# Patient Record
Sex: MALE | Race: WHITE | NOT HISPANIC OR LATINO | URBAN - METROPOLITAN AREA
[De-identification: names, ages, dates, MRNs, and addresses within clinical notes are randomized per-mention and may not be internally consistent; named-entity substitution may affect disease eponyms.]

---

## 2017-05-20 ENCOUNTER — INPATIENT (INPATIENT)
Facility: HOSPITAL | Age: 82
LOS: 10 days | Discharge: HOME HEALTH | End: 2017-05-31
Attending: INTERNAL MEDICINE | Admitting: INTERNAL MEDICINE
Payer: MEDICARE

## 2017-05-20 VITALS
TEMPERATURE: 99 F | DIASTOLIC BLOOD PRESSURE: 67 MMHG | SYSTOLIC BLOOD PRESSURE: 117 MMHG | RESPIRATION RATE: 15 BRPM | OXYGEN SATURATION: 99 % | HEART RATE: 88 BPM

## 2017-05-20 DIAGNOSIS — Z89.611 ACQUIRED ABSENCE OF RIGHT LEG ABOVE KNEE: Chronic | ICD-10-CM

## 2017-05-20 NOTE — ED ADULT TRIAGE NOTE - CHIEF COMPLAINT QUOTE
Pt c/o back and shoulder pain today.  Pt has history of right BKA and CVA.  Pt denies trauma/fall. Pt coming from home, c/o back and shoulder pain today.  Pt has history of right BKA and CVA.  Pt denies trauma/fall.

## 2017-05-21 DIAGNOSIS — K21.9 GASTRO-ESOPHAGEAL REFLUX DISEASE WITHOUT ESOPHAGITIS: ICD-10-CM

## 2017-05-21 DIAGNOSIS — N17.9 ACUTE KIDNEY FAILURE, UNSPECIFIED: ICD-10-CM

## 2017-05-21 DIAGNOSIS — E86.0 DEHYDRATION: ICD-10-CM

## 2017-05-21 DIAGNOSIS — E87.5 HYPERKALEMIA: ICD-10-CM

## 2017-05-21 DIAGNOSIS — I50.22 CHRONIC SYSTOLIC (CONGESTIVE) HEART FAILURE: ICD-10-CM

## 2017-05-21 DIAGNOSIS — Z29.9 ENCOUNTER FOR PROPHYLACTIC MEASURES, UNSPECIFIED: ICD-10-CM

## 2017-05-21 DIAGNOSIS — I10 ESSENTIAL (PRIMARY) HYPERTENSION: ICD-10-CM

## 2017-05-21 DIAGNOSIS — R41.0 DISORIENTATION, UNSPECIFIED: ICD-10-CM

## 2017-05-21 LAB
ALBUMIN SERPL ELPH-MCNC: 4.4 G/DL — SIGNIFICANT CHANGE UP (ref 3.3–5)
ALP SERPL-CCNC: 50 U/L — SIGNIFICANT CHANGE UP (ref 40–120)
ALT FLD-CCNC: 12 U/L — SIGNIFICANT CHANGE UP (ref 4–41)
APPEARANCE UR: CLEAR — SIGNIFICANT CHANGE UP
AST SERPL-CCNC: 18 U/L — SIGNIFICANT CHANGE UP (ref 4–40)
BASE EXCESS BLDV CALC-SCNC: 3 MMOL/L — SIGNIFICANT CHANGE UP
BASOPHILS # BLD AUTO: 0.04 K/UL — SIGNIFICANT CHANGE UP (ref 0–0.2)
BASOPHILS # BLD AUTO: 0.04 K/UL — SIGNIFICANT CHANGE UP (ref 0–0.2)
BASOPHILS NFR BLD AUTO: 0.5 % — SIGNIFICANT CHANGE UP (ref 0–2)
BASOPHILS NFR BLD AUTO: 0.5 % — SIGNIFICANT CHANGE UP (ref 0–2)
BILIRUB SERPL-MCNC: 0.4 MG/DL — SIGNIFICANT CHANGE UP (ref 0.2–1.2)
BILIRUB UR-MCNC: NEGATIVE — SIGNIFICANT CHANGE UP
BLOOD GAS VENOUS - CREATININE: 1.67 MG/DL — HIGH (ref 0.5–1.3)
BLOOD UR QL VISUAL: NEGATIVE — SIGNIFICANT CHANGE UP
BUN SERPL-MCNC: 49 MG/DL — HIGH (ref 7–23)
BUN SERPL-MCNC: 54 MG/DL — HIGH (ref 7–23)
CALCIUM SERPL-MCNC: 10 MG/DL — SIGNIFICANT CHANGE UP (ref 8.4–10.5)
CALCIUM SERPL-MCNC: 9.1 MG/DL — SIGNIFICANT CHANGE UP (ref 8.4–10.5)
CHLORIDE BLDV-SCNC: 108 MMOL/L — SIGNIFICANT CHANGE UP (ref 96–108)
CHLORIDE SERPL-SCNC: 101 MMOL/L — SIGNIFICANT CHANGE UP (ref 98–107)
CHLORIDE SERPL-SCNC: 106 MMOL/L — SIGNIFICANT CHANGE UP (ref 98–107)
CK MB BLD-MCNC: 2.46 NG/ML — SIGNIFICANT CHANGE UP (ref 1–6.6)
CK MB BLD-MCNC: 2.87 NG/ML — SIGNIFICANT CHANGE UP (ref 1–6.6)
CK MB BLD-MCNC: SIGNIFICANT CHANGE UP (ref 0–2.5)
CK MB BLD-MCNC: SIGNIFICANT CHANGE UP (ref 0–2.5)
CK SERPL-CCNC: 56 U/L — SIGNIFICANT CHANGE UP (ref 30–200)
CK SERPL-CCNC: 72 U/L — SIGNIFICANT CHANGE UP (ref 30–200)
CO2 SERPL-SCNC: 25 MMOL/L — SIGNIFICANT CHANGE UP (ref 22–31)
CO2 SERPL-SCNC: 34 MMOL/L — HIGH (ref 22–31)
COLOR SPEC: SIGNIFICANT CHANGE UP
CREAT SERPL-MCNC: 1.36 MG/DL — HIGH (ref 0.5–1.3)
CREAT SERPL-MCNC: 1.69 MG/DL — HIGH (ref 0.5–1.3)
EOSINOPHIL # BLD AUTO: 0.17 K/UL — SIGNIFICANT CHANGE UP (ref 0–0.5)
EOSINOPHIL # BLD AUTO: 0.19 K/UL — SIGNIFICANT CHANGE UP (ref 0–0.5)
EOSINOPHIL NFR BLD AUTO: 1.9 % — SIGNIFICANT CHANGE UP (ref 0–6)
EOSINOPHIL NFR BLD AUTO: 2.6 % — SIGNIFICANT CHANGE UP (ref 0–6)
GAS PNL BLDV: 134 MMOL/L — LOW (ref 136–146)
GLUCOSE BLDV-MCNC: 176 — HIGH (ref 70–99)
GLUCOSE SERPL-MCNC: 141 MG/DL — HIGH (ref 70–99)
GLUCOSE SERPL-MCNC: 179 MG/DL — HIGH (ref 70–99)
GLUCOSE UR-MCNC: NEGATIVE — SIGNIFICANT CHANGE UP
HBA1C BLD-MCNC: 6.9 % — HIGH (ref 4–5.6)
HCO3 BLDV-SCNC: 26 MMOL/L — SIGNIFICANT CHANGE UP (ref 20–27)
HCT VFR BLD CALC: 32.5 % — LOW (ref 39–50)
HCT VFR BLD CALC: 37.9 % — LOW (ref 39–50)
HCT VFR BLDV CALC: 38.2 % — LOW (ref 39–51)
HGB BLD-MCNC: 10.8 G/DL — LOW (ref 13–17)
HGB BLD-MCNC: 12.3 G/DL — LOW (ref 13–17)
HGB BLDV-MCNC: 12.4 G/DL — LOW (ref 13–17)
HYALINE CASTS # UR AUTO: SIGNIFICANT CHANGE UP (ref 0–?)
IMM GRANULOCYTES NFR BLD AUTO: 0.1 % — SIGNIFICANT CHANGE UP (ref 0–1.5)
IMM GRANULOCYTES NFR BLD AUTO: 0.3 % — SIGNIFICANT CHANGE UP (ref 0–1.5)
KETONES UR-MCNC: NEGATIVE — SIGNIFICANT CHANGE UP
LACTATE BLDV-MCNC: 2.8 MMOL/L — HIGH (ref 0.5–2)
LEUKOCYTE ESTERASE UR-ACNC: NEGATIVE — SIGNIFICANT CHANGE UP
LYMPHOCYTES # BLD AUTO: 1.62 K/UL — SIGNIFICANT CHANGE UP (ref 1–3.3)
LYMPHOCYTES # BLD AUTO: 1.86 K/UL — SIGNIFICANT CHANGE UP (ref 1–3.3)
LYMPHOCYTES # BLD AUTO: 18.5 % — SIGNIFICANT CHANGE UP (ref 13–44)
LYMPHOCYTES # BLD AUTO: 25.4 % — SIGNIFICANT CHANGE UP (ref 13–44)
MAGNESIUM SERPL-MCNC: 1.8 MG/DL — SIGNIFICANT CHANGE UP (ref 1.6–2.6)
MCHC RBC-ENTMCNC: 31 PG — SIGNIFICANT CHANGE UP (ref 27–34)
MCHC RBC-ENTMCNC: 31.3 PG — SIGNIFICANT CHANGE UP (ref 27–34)
MCHC RBC-ENTMCNC: 32.5 % — SIGNIFICANT CHANGE UP (ref 32–36)
MCHC RBC-ENTMCNC: 33.2 % — SIGNIFICANT CHANGE UP (ref 32–36)
MCV RBC AUTO: 94.2 FL — SIGNIFICANT CHANGE UP (ref 80–100)
MCV RBC AUTO: 95.5 FL — SIGNIFICANT CHANGE UP (ref 80–100)
MONOCYTES # BLD AUTO: 0.46 K/UL — SIGNIFICANT CHANGE UP (ref 0–0.9)
MONOCYTES # BLD AUTO: 0.82 K/UL — SIGNIFICANT CHANGE UP (ref 0–0.9)
MONOCYTES NFR BLD AUTO: 6.3 % — SIGNIFICANT CHANGE UP (ref 2–14)
MONOCYTES NFR BLD AUTO: 9.4 % — SIGNIFICANT CHANGE UP (ref 2–14)
MUCOUS THREADS # UR AUTO: SIGNIFICANT CHANGE UP
NEUTROPHILS # BLD AUTO: 4.77 K/UL — SIGNIFICANT CHANGE UP (ref 1.8–7.4)
NEUTROPHILS # BLD AUTO: 6.09 K/UL — SIGNIFICANT CHANGE UP (ref 1.8–7.4)
NEUTROPHILS NFR BLD AUTO: 65.1 % — SIGNIFICANT CHANGE UP (ref 43–77)
NEUTROPHILS NFR BLD AUTO: 69.4 % — SIGNIFICANT CHANGE UP (ref 43–77)
NITRITE UR-MCNC: NEGATIVE — SIGNIFICANT CHANGE UP
PCO2 BLDV: 45 MMHG — SIGNIFICANT CHANGE UP (ref 41–51)
PH BLDV: 7.4 PH — SIGNIFICANT CHANGE UP (ref 7.32–7.43)
PH UR: 6 — SIGNIFICANT CHANGE UP (ref 4.6–8)
PLATELET # BLD AUTO: 140 K/UL — LOW (ref 150–400)
PLATELET # BLD AUTO: 146 K/UL — LOW (ref 150–400)
PMV BLD: 11.9 FL — SIGNIFICANT CHANGE UP (ref 7–13)
PMV BLD: 11.9 FL — SIGNIFICANT CHANGE UP (ref 7–13)
PO2 BLDV: 47 MMHG — HIGH (ref 35–40)
POTASSIUM BLDV-SCNC: 5.1 MMOL/L — HIGH (ref 3.4–4.5)
POTASSIUM SERPL-MCNC: 4.9 MMOL/L — SIGNIFICANT CHANGE UP (ref 3.5–5.3)
POTASSIUM SERPL-MCNC: 5.6 MMOL/L — HIGH (ref 3.5–5.3)
POTASSIUM SERPL-SCNC: 4.9 MMOL/L — SIGNIFICANT CHANGE UP (ref 3.5–5.3)
POTASSIUM SERPL-SCNC: 5.6 MMOL/L — HIGH (ref 3.5–5.3)
PROT SERPL-MCNC: 7.4 G/DL — SIGNIFICANT CHANGE UP (ref 6–8.3)
PROT UR-MCNC: 30 — SIGNIFICANT CHANGE UP
RBC # BLD: 3.45 M/UL — LOW (ref 4.2–5.8)
RBC # BLD: 3.97 M/UL — LOW (ref 4.2–5.8)
RBC # FLD: 14.6 % — HIGH (ref 10.3–14.5)
RBC # FLD: 14.6 % — HIGH (ref 10.3–14.5)
RBC CASTS # UR COMP ASSIST: SIGNIFICANT CHANGE UP (ref 0–?)
SAO2 % BLDV: 81.2 % — SIGNIFICANT CHANGE UP (ref 60–85)
SODIUM SERPL-SCNC: 144 MMOL/L — SIGNIFICANT CHANGE UP (ref 135–145)
SODIUM SERPL-SCNC: 144 MMOL/L — SIGNIFICANT CHANGE UP (ref 135–145)
SP GR SPEC: 1.02 — SIGNIFICANT CHANGE UP (ref 1–1.03)
SQUAMOUS # UR AUTO: SIGNIFICANT CHANGE UP
TROPONIN T SERPL-MCNC: < 0.06 NG/ML — SIGNIFICANT CHANGE UP (ref 0–0.06)
TROPONIN T SERPL-MCNC: < 0.06 NG/ML — SIGNIFICANT CHANGE UP (ref 0–0.06)
UROBILINOGEN FLD QL: 2 E.U. — SIGNIFICANT CHANGE UP (ref 0.1–0.2)
WBC # BLD: 7.33 K/UL — SIGNIFICANT CHANGE UP (ref 3.8–10.5)
WBC # BLD: 8.77 K/UL — SIGNIFICANT CHANGE UP (ref 3.8–10.5)
WBC # FLD AUTO: 7.33 K/UL — SIGNIFICANT CHANGE UP (ref 3.8–10.5)
WBC # FLD AUTO: 8.77 K/UL — SIGNIFICANT CHANGE UP (ref 3.8–10.5)
WBC UR QL: SIGNIFICANT CHANGE UP (ref 0–?)

## 2017-05-21 PROCEDURE — 70450 CT HEAD/BRAIN W/O DYE: CPT | Mod: 26

## 2017-05-21 RX ORDER — GABAPENTIN 400 MG/1
300 CAPSULE ORAL DAILY
Qty: 0 | Refills: 0 | Status: DISCONTINUED | OUTPATIENT
Start: 2017-05-21 | End: 2017-05-31

## 2017-05-21 RX ORDER — ASPIRIN/CALCIUM CARB/MAGNESIUM 324 MG
81 TABLET ORAL DAILY
Qty: 0 | Refills: 0 | Status: DISCONTINUED | OUTPATIENT
Start: 2017-05-21 | End: 2017-05-31

## 2017-05-21 RX ORDER — SODIUM CHLORIDE 9 MG/ML
3 INJECTION INTRAMUSCULAR; INTRAVENOUS; SUBCUTANEOUS EVERY 8 HOURS
Qty: 0 | Refills: 0 | Status: DISCONTINUED | OUTPATIENT
Start: 2017-05-21 | End: 2017-05-31

## 2017-05-21 RX ORDER — HALOPERIDOL DECANOATE 100 MG/ML
1 INJECTION INTRAMUSCULAR ONCE
Qty: 0 | Refills: 0 | Status: COMPLETED | OUTPATIENT
Start: 2017-05-21 | End: 2017-05-21

## 2017-05-21 RX ORDER — ACETAMINOPHEN 500 MG
650 TABLET ORAL EVERY 6 HOURS
Qty: 0 | Refills: 0 | Status: DISCONTINUED | OUTPATIENT
Start: 2017-05-21 | End: 2017-05-31

## 2017-05-21 RX ORDER — ACETAMINOPHEN 500 MG
650 TABLET ORAL ONCE
Qty: 0 | Refills: 0 | Status: COMPLETED | OUTPATIENT
Start: 2017-05-21 | End: 2017-05-21

## 2017-05-21 RX ORDER — RISPERIDONE 4 MG/1
1 TABLET ORAL AT BEDTIME
Qty: 0 | Refills: 0 | Status: DISCONTINUED | OUTPATIENT
Start: 2017-05-21 | End: 2017-05-31

## 2017-05-21 RX ORDER — HALOPERIDOL DECANOATE 100 MG/ML
0.5 INJECTION INTRAMUSCULAR ONCE
Qty: 0 | Refills: 0 | Status: DISCONTINUED | OUTPATIENT
Start: 2017-05-21 | End: 2017-05-21

## 2017-05-21 RX ORDER — PANTOPRAZOLE SODIUM 20 MG/1
40 TABLET, DELAYED RELEASE ORAL
Qty: 0 | Refills: 0 | Status: DISCONTINUED | OUTPATIENT
Start: 2017-05-21 | End: 2017-05-31

## 2017-05-21 RX ORDER — CITALOPRAM 10 MG/1
10 TABLET, FILM COATED ORAL DAILY
Qty: 0 | Refills: 0 | Status: DISCONTINUED | OUTPATIENT
Start: 2017-05-21 | End: 2017-05-31

## 2017-05-21 RX ORDER — HEPARIN SODIUM 5000 [USP'U]/ML
5000 INJECTION INTRAVENOUS; SUBCUTANEOUS EVERY 8 HOURS
Qty: 0 | Refills: 0 | Status: DISCONTINUED | OUTPATIENT
Start: 2017-05-21 | End: 2017-05-31

## 2017-05-21 RX ORDER — SENNA PLUS 8.6 MG/1
2 TABLET ORAL AT BEDTIME
Qty: 0 | Refills: 0 | Status: DISCONTINUED | OUTPATIENT
Start: 2017-05-21 | End: 2017-05-31

## 2017-05-21 RX ORDER — RISPERIDONE 4 MG/1
0.5 TABLET ORAL
Qty: 0 | Refills: 0 | Status: DISCONTINUED | OUTPATIENT
Start: 2017-05-21 | End: 2017-05-31

## 2017-05-21 RX ORDER — SODIUM CHLORIDE 9 MG/ML
1000 INJECTION INTRAMUSCULAR; INTRAVENOUS; SUBCUTANEOUS ONCE
Qty: 0 | Refills: 0 | Status: COMPLETED | OUTPATIENT
Start: 2017-05-21 | End: 2017-05-21

## 2017-05-21 RX ORDER — SIMVASTATIN 20 MG/1
20 TABLET, FILM COATED ORAL AT BEDTIME
Qty: 0 | Refills: 0 | Status: DISCONTINUED | OUTPATIENT
Start: 2017-05-21 | End: 2017-05-31

## 2017-05-21 RX ORDER — SODIUM CHLORIDE 9 MG/ML
1000 INJECTION INTRAMUSCULAR; INTRAVENOUS; SUBCUTANEOUS
Qty: 0 | Refills: 0 | Status: DISCONTINUED | OUTPATIENT
Start: 2017-05-21 | End: 2017-05-22

## 2017-05-21 RX ORDER — CALCIUM GLUCONATE 100 MG/ML
1 VIAL (ML) INTRAVENOUS ONCE
Qty: 0 | Refills: 0 | Status: COMPLETED | OUTPATIENT
Start: 2017-05-21 | End: 2017-05-21

## 2017-05-21 RX ADMIN — CITALOPRAM 10 MILLIGRAM(S): 10 TABLET, FILM COATED ORAL at 12:29

## 2017-05-21 RX ADMIN — Medication 650 MILLIGRAM(S): at 03:28

## 2017-05-21 RX ADMIN — RISPERIDONE 1 MILLIGRAM(S): 4 TABLET ORAL at 21:42

## 2017-05-21 RX ADMIN — Medication 81 MILLIGRAM(S): at 12:29

## 2017-05-21 RX ADMIN — RISPERIDONE 0.5 MILLIGRAM(S): 4 TABLET ORAL at 11:39

## 2017-05-21 RX ADMIN — GABAPENTIN 300 MILLIGRAM(S): 400 CAPSULE ORAL at 12:29

## 2017-05-21 RX ADMIN — SODIUM CHLORIDE 3 MILLILITER(S): 9 INJECTION INTRAMUSCULAR; INTRAVENOUS; SUBCUTANEOUS at 21:08

## 2017-05-21 RX ADMIN — Medication 1 MILLIGRAM(S): at 04:40

## 2017-05-21 RX ADMIN — Medication 200 GRAM(S): at 03:57

## 2017-05-21 RX ADMIN — Medication 650 MILLIGRAM(S): at 01:58

## 2017-05-21 RX ADMIN — HEPARIN SODIUM 5000 UNIT(S): 5000 INJECTION INTRAVENOUS; SUBCUTANEOUS at 21:42

## 2017-05-21 RX ADMIN — Medication 1 MILLIGRAM(S): at 21:04

## 2017-05-21 RX ADMIN — Medication 1 MILLIGRAM(S): at 16:02

## 2017-05-21 RX ADMIN — SODIUM CHLORIDE 1000 MILLILITER(S): 9 INJECTION INTRAMUSCULAR; INTRAVENOUS; SUBCUTANEOUS at 03:57

## 2017-05-21 RX ADMIN — Medication 1 MILLIGRAM(S): at 03:57

## 2017-05-21 RX ADMIN — HALOPERIDOL DECANOATE 1 MILLIGRAM(S): 100 INJECTION INTRAMUSCULAR at 22:25

## 2017-05-21 RX ADMIN — SODIUM CHLORIDE 3 MILLILITER(S): 9 INJECTION INTRAMUSCULAR; INTRAVENOUS; SUBCUTANEOUS at 14:00

## 2017-05-21 RX ADMIN — HEPARIN SODIUM 5000 UNIT(S): 5000 INJECTION INTRAVENOUS; SUBCUTANEOUS at 14:00

## 2017-05-21 RX ADMIN — SIMVASTATIN 20 MILLIGRAM(S): 20 TABLET, FILM COATED ORAL at 21:42

## 2017-05-21 NOTE — CONSULT NOTE ADULT - SUBJECTIVE AND OBJECTIVE BOX
HPI:  81 yo male PMHx of CVA with R sided paralysis, Dementia, CAD, CABG, Systolic HF, HTN, HLD and DM p/w worsening confusion, slurred speech and R shoulder pain x 2 weeks. pt confuse, unable to attain proper hx, information obtained from H&P .  No reported fever, chills, diaphoresis, chest pain, sob or palpitations.       PAST MEDICAL & SURGICAL HISTORY:  Aortic stenosis, moderate  PAD (peripheral artery disease)  GERD (Gastroesophageal Reflux Disease)  CAD (Coronary Artery Disease)  HTN  DM (Diabetes Mellitus)  CVA (Cerebral Infarction): with right hemiparesis s/p carotididendarterectomy in 2008  S/P AKA (above knee amputation) unilateral, right  S/P CABG X 4: 1998  History of Carotid Endarterectomy          PREVIOUS DIAGNOSTIC TESTING:    [x ] Echocardiogram:    Study Date:   9/9/2016Ejection Fraction (Teicholtz): 23 %CONCLUSIONS:  1. Mitral annular calcification and calcified mitral  leaflets with decreased diastolic opening. Minimal mitral  regurgitation. Mean transmitral valve gradient equals 4 mm  Hg, consistent with mild mitral stenosis.  2. Calcified aortic valve with decreased opening. Leaflet  morphology not well visualized, probable moderate aortic  stenosis.  Peak transaortic valve gradient equals 26 mm Hg,  mean transaortic valve gradient equals 11 mm Hg, which  could be underestimated in the setting of severe LV  dysfunction.  3. Severe left ventricular enlargement.  4. Severe global left ventricular systolic dysfunction.  5. Normal right ventricular size and function.  6. Normal tricuspid valve.  Mild-moderate tricuspid  regurgitation.  7. Estimated pulmonary artery systolic pressure equals 35  mm Hg, assuming right atrial pressure equals 10  mm Hg,  consistent with borderline pulmonary hypertension.  8. Bilateral pleural effusions.    [x] Stress Test:     STUDY DATE: 07/06/2016IMPRESSIONS:Abnormal Study  * Myocardial Perfusion SPECT results are abnormal.  * There is a large, severe defect in inferior wall that is  fixed, suggestive of infarct.There are large, moderate to  severe defects in anterolateral, apical walls that are  partially reversible, suggestive of infarction with  moderate cem-infarct ischemia.There is a  defect.  * Post-stress gated wall motion analysis was performed  (LVEF = 23 %;LVEDV = 293 ml.), revealing severe  hypokinesis. The inferior wall is akinetic. 	    MEDICATIONS:  MEDICATIONS  (STANDING):  aspirin enteric coated 81milliGRAM(s) Oral daily  citalopram 10milliGRAM(s) Oral daily  pantoprazole    Tablet 40milliGRAM(s) Oral before breakfast  gabapentin 300milliGRAM(s) Oral daily  risperiDONE   Tablet 0.5milliGRAM(s) Oral <User Schedule>  risperiDONE   Tablet 1milliGRAM(s) Oral at bedtime  simvastatin 20milliGRAM(s) Oral at bedtime  sodium chloride 0.9% lock flush 3milliLiter(s) IV Push every 8 hours  heparin  Injectable 5000Unit(s) SubCutaneous every 8 hours      FAMILY HISTORY:  No pertinent family history in first degree relatives      SOCIAL HISTORY:    [x ] Non-smoker  [ ] Smoker  [ ] Alcohol    Allergies    No Known Allergies    Intolerances    	    REVIEW OF SYSTEMS:  CONSTITUTIONAL: No fever, weight loss, or fatigue  EYES: No eye pain, visual disturbances, or discharge  ENMT:  No difficulty hearing, tinnitus, vertigo; No sinus or throat pain  NECK: No pain or stiffness  RESPIRATORY: No cough, wheezing, chills or hemoptysis; No Shortness of Breath  CARDIOVASCULAR: No chest pain, palpitations, passing out, dizziness, or leg swelling  GASTROINTESTINAL: No abdominal or epigastric pain. No nausea, vomiting, or hematemesis; No diarrhea or constipation. No melena or hematochezia.  GENITOURINARY: No dysuria, frequency, hematuria, or incontinence  NEUROLOGICAL: No headaches, memory loss, loss of strength, numbness, or tremors  SKIN: No itching, burning, rashes, or lesions   	    [ ] All others negative	  [x ] Unable to obtain    PHYSICAL EXAM:  T(C): 37.1, Max: 37.2 (05-20 @ 23:41)  HR: 62 (62 - 92)  BP: 143/55 (97/62 - 149/59)  RR: 16 (15 - 16)  SpO2: 97% (96% - 99%)  Wt(kg): --  I&O's Summary      Appearance: Normal	  Psychiatry: A & O x 1, confuse  HEENT:   Normal oral mucosa, PERRL, EOMI	  Lymphatic: No lymphadenopathy  Cardiovascular: Normal S1 S2,RRR, No JVD, 2/6 sys  murmurs  Respiratory: Lungs clear to auscultation	  Gastrointestinal:  Soft, Non-tender, + BS	  Skin: No rashes, No ecchymoses, No cyanosis	  Neurologic: Non-focal  Extremities: Normal range of motion, No clubbing, cyanosis or edema  Vascular: Peripheral pulses palpable 2+ bilaterally    TELEMETRY: 	    ECG: SR with 2 degree AVB type 1, LAD, RBBB, LVH 	    RADIOLOGY:  CT Head: PROCEDURE DATE:  May 21 2017   IMPRESSION:   No acute intracranial hemorrhage, mass effect, or midline shift.    Unchanged extensive encephalomalacia and gliosis within the bilateral   frontoparietal lobes, left greater than right.    Unchanged microvascular disease.    OTHER: 	  	  LABS:	 	    CARDIAC MARKERS:    trop x 2 negative   CKMB: 2.46 ng/mL (05-21 @ 07:27)  CKMB: 2.87 ng/mL (05-21 @ 02:15)    CKMB Relative Index: Test not performed (05-21 @ 07:27)  CKMB Relative Index: Test not performed (05-21 @ 02:15)                            10.8   7.33  )-----------( 140      ( 21 May 2017 07:27 )             32.5     05-21    144  |  106  |  49<H>  ----------------------------<  141<H>  4.9   |  25  |  1.36<H>    Ca    9.1      21 May 2017 07:27  Mg     1.8     05-21    TPro  7.4  /  Alb  4.4  /  TBili  0.4  /  DBili  x   /  AST  18  /  ALT  12  /  AlkPhos  50  05-21      proBNP:   Lipid Profile:   HgA1c: Hemoglobin A1C, Whole Blood: 6.9 % (05-21 @ 07:27)

## 2017-05-21 NOTE — ED PROVIDER NOTE - ATTENDING CONTRIBUTION TO CARE
ED Attending Dr. Ellis: 81 yo male with CVA in past (residual right-sided paralysis), CAD s/p CABG, CHF, HTN, HLD, PVD s/p R BKA, in ED with now resolved right shoulder pain.  No trauma.  Pt denies any acute complaints in ED.  On exam pt chronically ill appearing but in NAD, heart RRR, lungs CTAB, abd NTND, extremities without swelling, full ROM, right LE s/p BKA, strength 5/5 in all intact extremities and skin without rash.

## 2017-05-21 NOTE — ED ADULT NURSE NOTE - OBJECTIVE STATEMENT
82 years old male presents with complaints of generalized back pain, denies recent trauma. On arrival, patient is alert and oriented to name, restless, weakness of bilateral upper and lower extremities noted. 20 gauge saline lock inserted on left basilic vein , blood drawn and sent. Urinalysis sent. Patient is turned and positioned. Will follow up and monitor. 82 years old male presents with complaints of generalized back pain, denies recent trauma. On arrival, patient is alert and oriented to name, restless, weakness of bilateral upper and left lower extremity noted. right BKA noted. 20 gauge saline lock inserted on left basilic vein , blood drawn and sent. Urinalysis sent. Patient is turned and positioned. Will follow up and monitor.

## 2017-05-21 NOTE — ED ADULT NURSE NOTE - CHIEF COMPLAINT QUOTE
Pt coming from home, c/o back and shoulder pain today.  Pt has history of right BKA and CVA.  Pt denies trauma/fall.

## 2017-05-21 NOTE — CONSULT NOTE ADULT - PROBLEM SELECTOR RECOMMENDATION 4
D/c ACEi, Lasix.  Continue with other home anti-hypertensive medications. Monitor BP. D/c ACEi, Lasix.  Monitor off anti-hypertensive medications for now. If BP increases, can consider CCB or BB.  Will d/w cardiology if this is the case.

## 2017-05-21 NOTE — H&P ADULT - RS GEN PE MLT RESP DETAILS PC
airway patent/respirations non-labored/no chest wall tenderness/clear to auscultation bilaterally/no rales/good air movement/breath sounds equal

## 2017-05-21 NOTE — PROGRESS NOTE ADULT - SUBJECTIVE AND OBJECTIVE BOX
consult to be dictated    Pt with hx cva p/w ams. History difficult to obtain fro pt    Rosana.  1.eeg  2. rule out underlying infection.  3 consider ortho eval for rue pain  4. if jerel imporovemnt of mental status, consider either mri brain or rpt ct head

## 2017-05-21 NOTE — CONSULT NOTE ADULT - ASSESSMENT
82 year-old man with JULIO due to dehydration.  Hyperkalemia due to JULIO in patient on ACEi and KCl supplementation. HTN with BP controlled. Chronic systolic heart failure.

## 2017-05-21 NOTE — ED PROVIDER NOTE - MEDICAL DECISION MAKING DETAILS
81 yo male w h/o CVA (R-side paralysis), CAD s/p CABG, HFrEF (41%), HTN, HLD, PVD s/p R BKA p/w R shoulder pain, now resolved.

## 2017-05-21 NOTE — ED ADULT NURSE NOTE - CHPI ED SYMPTOMS NEG
no fatigue/no bladder dysfunction/no constipation/no motor function loss/no numbness/no neck tenderness/no bowel dysfunction/no difficulty bearing weight/no anorexia/no tingling

## 2017-05-21 NOTE — H&P ADULT - PROBLEM SELECTOR PLAN 3
tele monitor  s/p IV fluid hydration  Aspiration and Falls precautions  HOB elevated 30 degrees  consider neuro consult  Hold Diuretic for now

## 2017-05-21 NOTE — ED PROVIDER NOTE - OBJECTIVE STATEMENT
81 yo male w h/o CVA (R-side paralysis), CAD s/p CABG, HFrEF (41%), HTN, HLD, PVD s/p R BKA p/w R shoulder pain. Pt reports pain started suddenly overnight. The pain is in his R shoulder. He has had similar episodes of this pain in the past and it usually resolves with 2 aspirin. He did not take anything for the pain tonight. He thinks the pain may have been due to the position of his arm. He denies any falls or trauma. He is unsure of his medical history or medications. He reports living with a HHA and having a son who lives closeby.

## 2017-05-21 NOTE — H&P ADULT - ASSESSMENT
83 yo male PMHx of CVA with R sided paralysis, Dementia, CAD, CABG, Systolic HF, HTN, HLD and DM p/w worsening confusion, slurred speech and R shoulder pain x 2 weeks, found in JULIO and Hyperkalemia.    +Hyperkalemia-->s/p calcium gluconate IV and Normal Saline by ED  +JULIO-->S/p IV Fluids hydration, holding diuretic and ACEI

## 2017-05-21 NOTE — ED PROVIDER NOTE - PSH
History of Carotid Endarterectomy    S/P AKA (above knee amputation) unilateral, right    S/P CABG X 4  1998

## 2017-05-21 NOTE — ED ADULT NURSE REASSESSMENT NOTE - NS ED NURSE REASSESS COMMENT FT1
Pt confused, not coherent, calling for assistance every minute, reoriented to environment without no improvement.  MYLA Pickard informed.  Will repeat EKG.  Pt took risperidone po 4 hours ago.

## 2017-05-21 NOTE — ED ADULT NURSE REASSESSMENT NOTE - NS ED NURSE REASSESS COMMENT FT1
Patient received in room 5 AA&Ox2. VSS on RA. Patient denies pain, N/V, SOB, discomfort at this time - will continue to monitor. Patient received in room 5 AA&Ox1 - disoriented to place, time, and situation (NP Yusuf aware). VSS on RA. Patient denies pain, N/V, SOB, discomfort at this time - will continue to monitor.

## 2017-05-21 NOTE — ED PROVIDER NOTE - CARE PLAN
Principal Discharge DX:	Shoulder pain, right Principal Discharge DX:	Acute kidney injury  Instructions for follow-up, activity and diet:	Admit to medicine on tele

## 2017-05-21 NOTE — CONSULT NOTE ADULT - SUBJECTIVE AND OBJECTIVE BOX
Chief Complaint:  Patient is a 82y old  Male who presents with a chief complaint of worsening confusion (21 May 2017 09:37)      HPI:  81 yo male PMHx of CVA with R sided paralysis, Dementia, CAD, CABG, Systolic HF, HTN, HLD and DM p/w worsening confusion, slurred speech and R shoulder pain x 2 weeks. Unable to obtain ROS and History from the pt due to worsening confusion. Obtain ROS, medications and History from Ervin Jean ([facility]+HCP) [phone]. As per son pt's baseline mental status alert, oriented to himself and situation with intermittent confusion, but for past 2 weeks pt's confusion got worse and pt was complaining of right shoulder pain. No reported fever, chills, diaphoresis, chest pain, sob or palpitations. Son reports pt has HHA 24/. no melena no brbpr has had egd/colonoscopy not sure when   In E.D. pt found in hyperkalemia 5.6 and JULIO, he was given calcium gluconate IV and Normal Saline by ED.    Allergies:  No Known Allergies      Medications:  aspirin enteric coated 81milliGRAM(s) Oral daily  citalopram 10milliGRAM(s) Oral daily  pantoprazole    Tablet 40milliGRAM(s) Oral before breakfast  gabapentin 300milliGRAM(s) Oral daily  risperiDONE   Tablet 0.5milliGRAM(s) Oral <User Schedule>  risperiDONE   Tablet 1milliGRAM(s) Oral at bedtime  senna 2Tablet(s) Oral at bedtime PRN  simvastatin 20milliGRAM(s) Oral at bedtime  sodium chloride 0.9% lock flush 3milliLiter(s) IV Push every 8 hours  heparin  Injectable 5000Unit(s) SubCutaneous every 8 hours  acetaminophen   Tablet. 650milliGRAM(s) Oral every 6 hours PRN      PMHX/PSHX:  Aortic stenosis, moderate  PAD (peripheral artery disease)  GERD (Gastroesophageal Reflux Disease)  CAD (Coronary Artery Disease)  HTN  DM (Diabetes Mellitus)  CVA (Cerebral Infarction)  S/P AKA (above knee amputation) unilateral, right  S/P CABG X 4  History of Carotid Endarterectomy      Family history:  No pertinent family history in first degree relatives      Social History:     ROS:     General:  No wt loss, fevers, chills, night sweats, fatigue,   Eyes:  Good vision, no reported pain  ENT:  No sore throat, pain, runny nose, dysphagia  CV:  No pain, palpitations, hypo/hypertension  Resp:  No dyspnea, cough, tachypnea, wheezing  GI:  No pain, No nausea, No vomiting, No diarrhea, No constipation, No weight loss, No fever, No pruritis, No rectal bleeding, No tarry stools, No dysphagia,  :  No pain, bleeding, incontinence, nocturia  Muscle:  No pain, weakness  Neuro:  No weakness, tingling, memory problems  Psych:  No fatigue, insomnia, mood problems, depression  Endocrine:  No polyuria, polydipsia, cold/heat intolerance  Heme:  No petechiae, ecchymosis, easy bruisability  Skin:  No rash, tattoos, scars, edema      PHYSICAL EXAM:   Vital Signs:  Vital Signs Last 24 Hrs  T(C): 37.1, Max: 37.2 ( @ 23:41)  T(F): 98.7, Max: 98.9 ( @ 23:41)  HR: 62 (62 - 92)  BP: 143/55 (97/62 - 149/59)  BP(mean): --  RR: 16 (15 - 16)  SpO2: 97% (96% - 99%)  Daily Height in cm: 167.64 (21 May 2017 09:37)    Daily     GENERAL:  Appears stated age, well-groomed, well-nourished, no distress  HEENT:  NC/AT,  conjunctivae clear and pink, no thyromegaly, nodules, adenopathy, no JVD, sclera -anicteric  CHEST:  Full & symmetric excursion, no increased effort, breath sounds clear  HEART:  Regular rhythm, S1, S2, no murmur/rub/S3/S4, no abdominal bruit, no edema  ABDOMEN:  Soft, non-tender, non-distended, normoactive bowel sounds,  no masses ,no hepato-splenomegaly, no signs of chronic liver disease  EXTEREMITIES:  no cyanosis,clubbing or edema  SKIN:  No rash/erythema/ecchymoses/petechiae/wounds/abscess/warm/dry  NEURO:  Alert, oriented, no asterixis, no tremor, no encephalopathy    LABS:                        10.8   7.33  )-----------( 140      ( 21 May 2017 07:27 )             32.5     05-21    144  |  106  |  49<H>  ----------------------------<  141<H>  4.9   |  25  |  1.36<H>    Ca    9.1      21 May 2017 07:27  Mg     1.8         TPro  7.4  /  Alb  4.4  /  TBili  0.4  /  DBili  x   /  AST  18  /  ALT  12  /  AlkPhos  50      LIVER FUNCTIONS - ( 21 May 2017 02:15 )  Alb: 4.4 g/dL / Pro: 7.4 g/dL / ALK PHOS: 50 u/L / ALT: 12 u/L / AST: 18 u/L / GGT: x             Urinalysis Basic - ( 21 May 2017 00:25 )    Color: PLYEL / Appearance: CLEAR / S.023 / pH: 6.0  Gluc: NEGATIVE / Ketone: NEGATIVE  / Bili: NEGATIVE / Urobili: 2 E.U.   Blood: NEGATIVE / Protein: 30 / Nitrite: NEGATIVE   Leuk Esterase: NEGATIVE / RBC: 0-2 / WBC 0-2   Sq Epi: OCC / Non Sq Epi: x / Bacteria: x          Imaging:

## 2017-05-21 NOTE — CONSULT NOTE ADULT - ATTENDING COMMENTS
Patient seen and examined, agree with the above assessment and plan by CALLUM Steven.  Pt with h/o severe CM, chronic systolic CHF, CVA. dementia presenting with worsening mental status an dehydration  CV status appears stable, appears euvolemic at this time. Hold lasix  resume BB if HR allows  R/O infection
[facility] NEPHROLOGY  Eliel Edmond M.D.  Praful Dominguez D.O.  Debbie Mcqueen M.D.  Shelbi Lucas, MSN, ANP-C    Telephone: [phone]  Facsimile: [phone]    [address] [place] [address]

## 2017-05-21 NOTE — H&P ADULT - HISTORY OF PRESENT ILLNESS
83 yo male PMHx of CVA with R sided paralysis, Dementia, CAD, CABG, Systolic HF, HTN, HLD and DM p/w worsening confusion, slurred speech and R shoulder pain x 2 weeks. Unable to obtain ROS and History from the pt due to worsening confusion. Obtain ROS, medications and History from Ervin Jean (SON+HCP) [phone]. As per son pt's baseline mental status alert, oriented to himself and situation with intermittent confusion, but for past 2 weeks pt's confusion got worse and pt was complaining of right shoulder pain. No reported fever, chills, diaphoresis, chest pain, sob or palpitations. Son reports pt has HHA 24/7.   In E.D. pt found in hyperkalemia 5.6 and JULIO, he was given calcium gluconate IV and Normal Saline by ED.

## 2017-05-21 NOTE — CONSULT NOTE ADULT - SUBJECTIVE AND OBJECTIVE BOX
[facility] NEPHROLOGY- CONSULTATION NOTE    Patient is a 82y Male with dementia who presented to the hospital with worsening confusion, was found to have creatinine elevated to 1.6 and hyperkalemia.  Creatinine in 2016 was 0.9.  Pt deneis difficulty with urination or any other urinary symptoms.  He thinks he has been eating and drinking normally at home.  Pt has history of CHF with severely reduced EF (EF 23 %).  He is on lasix 20mg po daily at home as well as an ACEi and KCl supplementation.  Pt was given IVF in the ED.  He feels okay overall, but is confused and a poor historian.    PAST MEDICAL & SURGICAL HISTORY:  Aortic stenosis, moderate  CHF with reduced EF  PAD (peripheral artery disease)  GERD (Gastroesophageal Reflux Disease)  CAD (Coronary Artery Disease)  HTN  DM (Diabetes Mellitus)  CVA (Cerebral Infarction): with right hemiparesis s/p carotididendarterectomy in   S/P AKA (above knee amputation) unilateral, right  S/P CABG X 4:   History of Carotid Endarterectomy    No Known Allergies    Home Medications Reviewed  Hospital Medications:   MEDICATIONS  (STANDING):  aspirin enteric coated 81milliGRAM(s) Oral daily  citalopram 10milliGRAM(s) Oral daily  pantoprazole    Tablet 40milliGRAM(s) Oral before breakfast  gabapentin 300milliGRAM(s) Oral daily  risperiDONE   Tablet 0.5milliGRAM(s) Oral <User Schedule>  risperiDONE   Tablet 1milliGRAM(s) Oral at bedtime  simvastatin 20milliGRAM(s) Oral at bedtime  sodium chloride 0.9% lock flush 3milliLiter(s) IV Push every 8 hours  heparin  Injectable 5000Unit(s) SubCutaneous every 8 hours    SOCIAL HISTORY:  Denies ETOh,Smoking,   FAMILY HISTORY:  No pertinent family history in first degree relatives    REVIEW OF SYSTEMS:  CONSTITUTIONAL: + weakness  EYES/ENT: No visual changes, but poor vision overall;  no throat pain   NECK: No pain or stiffness  RESPIRATORY: No cough, wheezing, hemoptysis; No shortness of breath  CARDIOVASCULAR: No chest pain or palpitations.  GASTROINTESTINAL: No abdominal or epigastric pain. No nausea, vomiting, or hematemesis; No diarrhea or constipation. No melena or hematochezia.  GENITOURINARY: No dysuria, frequency, foamy urine, urinary urgency, incontinence or hematuria  NEUROLOGICAL: always with weakness, at baseline  SKIN: No itching, burning, rashes, or lesions   VASCULAR: No bilateral lower extremity edema.   All other review of systems is negative unless indicated above.    VITALS:  T(F): 98.7, Max: 98.9 ( @ 23:41)  HR: 62  BP: 143/55  RR: 16  SpO2: 97%  Wt(kg): --    Height (cm): 167.6 ( @ 09:37)  Weight (kg): 81 ( @ 09:37)  BMI (kg/m2): 28.8 ( @ 09:37)  BSA (m2): 1.91 ( @ 09:37)  PHYSICAL EXAM:  Constitutional: NAD  HEENT: anicteric sclera, oropharynx clear, MM dry  Neck: No JVD  Respiratory: CTAB, no wheezes, rales or rhonchi  Cardiovascular: S1, S2, RRR  Gastrointestinal: BS+, soft, NT/ND  Extremities: No cyanosis or clubbing. No peripheral edema on L. R AKA.  Neurological: A/O x 1  Psychiatric: Normal mood, normal affect  : No CVA tenderness. No martinez. Bladder not palpable  Skin: No rashes, but multiple skin lesions. Poor skin turgor    LABS:      144  |  106  |  49<H>  ----------------------------<  141<H>  4.9   |  25  |  1.36<H>    Ca    9.1      21 May 2017 07:27  Mg     1.8         TPro  7.4  /  Alb  4.4  /  TBili  0.4  /  DBili      /  AST  18  /  ALT  12  /  AlkPhos  50      Creatinine Trend: 1.36 <--, 1.69 <--                        10.8   7.33  )-----------( 140      ( 21 May 2017 07:27 )             32.5     Urine Studies:  Urinalysis Basic - ( 21 May 2017 00:25 )    Color: PLYEL / Appearance: CLEAR / S.023 / pH: 6.0  Gluc: NEGATIVE / Ketone: NEGATIVE  / Bili: NEGATIVE / Urobili: 2 E.U.   Blood: NEGATIVE / Protein: 30 / Nitrite: NEGATIVE   Leuk Esterase: NEGATIVE / RBC: 0-2 / WBC 0-2   Sq Epi: OCC / Non Sq Epi:  / Bacteria:         RADIOLOGY & ADDITIONAL STUDIES:      Patient name: EMERSON ZAVALA  YOB: 1935   Age: 81 (M)   MR#: [id number]  Study Date: 2016  Location: [address] [place], [place] [address]onographer: Matilde Riojas [facility]  Study quality: Technically good  Referring Physician: Denys Forrest MD  Blood Pressure: 119/54 mmHg  Height: 167 cm  Weight: 99 kg  BSA: 2.1 m2  Heart Rate: 61 mmHg  ------------------------------------------------------------------------  PROCEDURE: Transthoracic echocardiogram with 2-D, M-Mode  and complete spectral and color flow Doppler.  INDICATION: Unspecified combined systolic (congestive) and  diastolic (congestive) heart failure (I50.40)  ------------------------------------------------------------------------  DIMENSIONS:  Dimensions:     Normal Values:  LA:     3.9 cm  2.0 - 4.0 cm  Ao:     3.9 cm    2.0 - 3.8 cm  SEPTUM: 1.0 cm    0.6 - 1.2 cm  PWT:    1.0 cm    0.6 - 1.1 cm  LVIDd:  6.5 cm    3.0 - 5.6 cm  LVIDs:  5.8 cm    1.8 - 4.0 cm  Derived Variables:  LVMI: 136 g/m2  RWT: 0.30  Fractional short: 11 %  Ejection Fraction (Jovanytz): 23 %  ------------------------------------------------------------------------  OBSERVATIONS:  Mitral Valve: Mitral annular calcification and calcified  mitral leaflets with decreased diastolic opening. Minimal  mitral regurgitation. Mean transmitral valve gradient  equals 4 mm Hg, consistent with mild mitral stenosis.  Aortic Root: Normal aortic root.  Aortic Valve: Calcified aortic valve with decreased  opening. Leaflet morphology not well visualized, probable  moderate aortic stenosis.  Peak transaortic valve gradient  equals 26 mm Hg, mean transaortic valve gradient equals 11  mm Hg, which could be underestimated in the setting of  severe LV dysfunction.  Left Atrium: Mild left atrial enlargement.  Left Ventricle: Severe global left ventricular systolic  dysfunction. Severe left ventricular enlargement.  Right Heart: Normal right atrium. Normal right ventricular  size and function. Normal tricuspid valve.  Mild-moderate  tricuspid regurgitation. Normal pulmonic valve.  Pericardium/PleuraNormal pericardium with no pericardial  effusion. Bilateral pleural effusions.  Hemodynamic: Estimated right ventricular systolic pressure  equals 35 mm Hg, assuming right atrial pressure equals 10  mm Hg, consistent withborderline pulmonary hypertension.  ------------------------------------------------------------------------  CONCLUSIONS:  1. Mitral annular calcification and calcified mitral  leaflets with decreased diastolic opening. Minimal mitral  regurgitation. Mean transmitral valve gradient equals 4 mm  Hg, consistent with mild mitral stenosis.  2. Calcified aortic valve with decreased opening. Leaflet  morphology not well visualized, probable moderate aortic  stenosis.  Peak transaortic valve gradient equals 26 mm Hg,  mean transaortic valve gradient equals 11 mm Hg, which  could be underestimated in the setting of severe LV  dysfunction.  3. Severe left ventricular enlargement.  4. Severe global left ventricular systolic dysfunction.  5. Normal right ventricular size and function.  6. Normal tricuspid valve.  Mild-moderate tricuspid  regurgitation.  7. Estimated pulmonary artery systolic pressure equals 35  mm Hg, assuming right atrial pressure equals 10  mm Hg,  consistent with borderline pulmonary hypertension.  8. Bilateral pleural effusions.  ------------------------------------------------------------------------  Confirmed on  2016 - 17:10:31 by Lu Heart M.D. RPVI  ------------------------------------------------------------------------

## 2017-05-21 NOTE — CONSULT NOTE ADULT - PROBLEM SELECTOR RECOMMENDATION 9
gerd precautions  in and out  ppi once a day  may need egd  advance diet  hematology follow up  in and out  check iron studies and anemia workup  may need egd/colonosocpy  ppi once a day  check stool occult blood  check cbc and transfuse as needed
Improving with IVF.  Will give another small amount of IVF now as pt still appears dehydrated  D/c lasix, ACEi, KCl

## 2017-05-21 NOTE — CONSULT NOTE ADULT - ASSESSMENT
83 yo male with CVA, Dementia, CAD, CABG, CMP, AS, Systolic HF, HTN, HLD and DM admitted  worsening dementia / JULIO /  Hyperkalemia.    1. CV stable No chest pain No SOB   - EKG reviewed, Results as mentioned above, no change from previous EKG     2. Sys HF/ CMP: given last echo results as mentioned above; EF 23 %. unlikely a candidate for AICD given advance dementia  - pt appears hypovolemic : hold lasix    - start metoprolol 12.5 mg PO BID   - Hold ACEI/ARB for now 2/2 to JULIO      3. Mod- Sev AS : known,  evidence by Echo results as mentioned above , continue to monitor     4. HLD : continue with statin     5. PT/OT, Pending Speech and swallow, MED FU     6. DVT PPX 81 yo male with CVA, Dementia, CAD, CABG, CMP, AS, Systolic HF, HTN, HLD and DM admitted  worsening dementia / JULIO /  Hyperkalemia.    1. CV stable No chest pain No SOB   - EKG reviewed, Results as mentioned above, no change from previous EKG     2. Sys HF/ CMP: given last echo results as mentioned above; EF 23 %. unlikely a candidate for AICD given advance dementia  - pt appears hypovolemic : hold lasix    - holding BB - HR 48- 60 on 12 lead ekg with SR with  2 degree HB type 1.   - Hold ACEI/ARB for now 2/2 to JULIO      3. Mod- Sev AS : known,  evidence by Echo results as mentioned above , continue to monitor     4. HLD : continue with statin     5. PT/OT, Pending Speech and swallow, MED FU     6. DVT PPX

## 2017-05-21 NOTE — ED PROVIDER NOTE - PROGRESS NOTE DETAILS
Spoke with son. Reports pt has been confused for 2 weeks. Has been reporting body pain. Cr elevated. K 5.6. T-waves slightly larger than prior EKGs. Will give fluids, Ca. Admit to medicine on tele.

## 2017-05-21 NOTE — ED PROVIDER NOTE - PMH
Aortic stenosis, moderate    CAD (Coronary Artery Disease)    CVA (Cerebral Infarction)  with right hemiparesis s/p carotididendarterectomy in 2008  GERD (Gastroesophageal Reflux Disease)    HTN    PAD (peripheral artery disease)

## 2017-05-22 DIAGNOSIS — R60.0 LOCALIZED EDEMA: ICD-10-CM

## 2017-05-22 LAB
AMMONIA BLD-MCNC: 15 UMOL/L — SIGNIFICANT CHANGE UP (ref 11–55)
BUN SERPL-MCNC: 33 MG/DL — HIGH (ref 7–23)
CALCIUM SERPL-MCNC: 9.7 MG/DL — SIGNIFICANT CHANGE UP (ref 8.4–10.5)
CHLORIDE SERPL-SCNC: 106 MMOL/L — SIGNIFICANT CHANGE UP (ref 98–107)
CO2 SERPL-SCNC: 20 MMOL/L — LOW (ref 22–31)
CREAT SERPL-MCNC: 0.95 MG/DL — SIGNIFICANT CHANGE UP (ref 0.5–1.3)
GLUCOSE SERPL-MCNC: 151 MG/DL — HIGH (ref 70–99)
HAV IGM SER-ACNC: NONREACTIVE — SIGNIFICANT CHANGE UP
HBV CORE IGM SER-ACNC: NONREACTIVE — SIGNIFICANT CHANGE UP
HBV SURFACE AG SER-ACNC: NONREACTIVE — SIGNIFICANT CHANGE UP
HCT VFR BLD CALC: 36.9 % — LOW (ref 39–50)
HCV AB S/CO SERPL IA: 0.26 S/CO — SIGNIFICANT CHANGE UP
HCV AB SERPL-IMP: SIGNIFICANT CHANGE UP
HGB BLD-MCNC: 12.2 G/DL — LOW (ref 13–17)
MCHC RBC-ENTMCNC: 31.2 PG — SIGNIFICANT CHANGE UP (ref 27–34)
MCHC RBC-ENTMCNC: 33.1 % — SIGNIFICANT CHANGE UP (ref 32–36)
MCV RBC AUTO: 94.4 FL — SIGNIFICANT CHANGE UP (ref 80–100)
PLATELET # BLD AUTO: 147 K/UL — LOW (ref 150–400)
PMV BLD: 12.1 FL — SIGNIFICANT CHANGE UP (ref 7–13)
POTASSIUM SERPL-MCNC: 4.4 MMOL/L — SIGNIFICANT CHANGE UP (ref 3.5–5.3)
POTASSIUM SERPL-SCNC: 4.4 MMOL/L — SIGNIFICANT CHANGE UP (ref 3.5–5.3)
RBC # BLD: 3.91 M/UL — LOW (ref 4.2–5.8)
RBC # FLD: 14.7 % — HIGH (ref 10.3–14.5)
SODIUM SERPL-SCNC: 143 MMOL/L — SIGNIFICANT CHANGE UP (ref 135–145)
WBC # BLD: 8.65 K/UL — SIGNIFICANT CHANGE UP (ref 3.8–10.5)
WBC # FLD AUTO: 8.65 K/UL — SIGNIFICANT CHANGE UP (ref 3.8–10.5)

## 2017-05-22 PROCEDURE — 90792 PSYCH DIAG EVAL W/MED SRVCS: CPT

## 2017-05-22 PROCEDURE — 95819 EEG AWAKE AND ASLEEP: CPT | Mod: 26

## 2017-05-22 PROCEDURE — 95957 EEG DIGITAL ANALYSIS: CPT | Mod: 26

## 2017-05-22 RX ORDER — HALOPERIDOL DECANOATE 100 MG/ML
0.5 INJECTION INTRAMUSCULAR EVERY 6 HOURS
Qty: 0 | Refills: 0 | Status: DISCONTINUED | OUTPATIENT
Start: 2017-05-22 | End: 2017-05-31

## 2017-05-22 RX ADMIN — RISPERIDONE 1 MILLIGRAM(S): 4 TABLET ORAL at 21:07

## 2017-05-22 RX ADMIN — HEPARIN SODIUM 5000 UNIT(S): 5000 INJECTION INTRAVENOUS; SUBCUTANEOUS at 07:11

## 2017-05-22 RX ADMIN — SIMVASTATIN 20 MILLIGRAM(S): 20 TABLET, FILM COATED ORAL at 21:07

## 2017-05-22 RX ADMIN — PANTOPRAZOLE SODIUM 40 MILLIGRAM(S): 20 TABLET, DELAYED RELEASE ORAL at 07:11

## 2017-05-22 RX ADMIN — HEPARIN SODIUM 5000 UNIT(S): 5000 INJECTION INTRAVENOUS; SUBCUTANEOUS at 21:16

## 2017-05-22 RX ADMIN — GABAPENTIN 300 MILLIGRAM(S): 400 CAPSULE ORAL at 12:26

## 2017-05-22 RX ADMIN — SODIUM CHLORIDE 3 MILLILITER(S): 9 INJECTION INTRAMUSCULAR; INTRAVENOUS; SUBCUTANEOUS at 14:28

## 2017-05-22 RX ADMIN — SODIUM CHLORIDE 3 MILLILITER(S): 9 INJECTION INTRAMUSCULAR; INTRAVENOUS; SUBCUTANEOUS at 06:48

## 2017-05-22 RX ADMIN — HEPARIN SODIUM 5000 UNIT(S): 5000 INJECTION INTRAVENOUS; SUBCUTANEOUS at 14:50

## 2017-05-22 RX ADMIN — HALOPERIDOL DECANOATE 0.5 MILLIGRAM(S): 100 INJECTION INTRAMUSCULAR at 21:18

## 2017-05-22 RX ADMIN — RISPERIDONE 0.5 MILLIGRAM(S): 4 TABLET ORAL at 09:20

## 2017-05-22 RX ADMIN — CITALOPRAM 10 MILLIGRAM(S): 10 TABLET, FILM COATED ORAL at 12:25

## 2017-05-22 RX ADMIN — Medication 0.5 MILLIGRAM(S): at 03:58

## 2017-05-22 RX ADMIN — Medication 81 MILLIGRAM(S): at 12:26

## 2017-05-22 RX ADMIN — SODIUM CHLORIDE 3 MILLILITER(S): 9 INJECTION INTRAMUSCULAR; INTRAVENOUS; SUBCUTANEOUS at 21:20

## 2017-05-22 NOTE — OCCUPATIONAL THERAPY INITIAL EVALUATION ADULT - IMPAIRMENTS CONTRIBUTING IMPAIRED BED MOBILITY, REHAB EVAL
impaired postural control/impaired balance/impaired motor control/abnormal muscle tone/decreased strength

## 2017-05-22 NOTE — PROGRESS NOTE ADULT - ASSESSMENT
81 yo male with CVA, Dementia, CAD, CABG, CMP, AS, Systolic HF, HTN, HLD and DM admitted  worsening dementia / JULIO /  Hyperkalemia.    1. CV stable No chest pain No SOB     2. Sys HF/ CMP: chronic,  EF 23 %. unlikely a candidate for AICD given advance dementia  - pt appears euvolemic : hold lasix    - holding BB - HR 48- 60 on 12 lead ekg with SR with  2 degree HB type 1.   - Resume BB once HR permits   - Hold ACEI/ARB for now 2/2 to JULIO      3. Mod- Sev AS : known,  evidence by Echo results as mentioned above , continue to monitor     4. HLD : continue with statin     5. MED FU / GI FU    6. DVT PPX 81 yo male with CVA, Dementia, CAD, CABG, CMP, AS, Systolic HF, HTN, HLD and DM admitted  worsening dementia / JULIO /  Hyperkalemia.    1. CV stable    Sys HF/ CMP: chronic,  EF 23 %. unlikely a candidate for AICD given advance dementia  - pt appears euvolemic : hold lasix    - Resume lopressor 12.5 mg daily , tele reviewed HR  .     - Hold ACEI/ARB for now 2/2 to JULIO    -monitor BMP  /I/O    3. Mod- Sev AS : known,  evidence by Echo results as mentioned above , continue to monitor     4. HLD : continue with statin     5. MED FU / GI FU / Renal FU /pysh FU     6. Neuro f/u.. pending EEG     7. DVT PPX 83 yo male with CVA, Dementia, CAD, CABG, CMP, AS, Systolic HF, HTN, HLD and DM admitted  worsening dementia / JULIO /  Hyperkalemia.    1. CV stable    Sys HF/ CMP: chronic,  EF 23 %. unlikely a candidate for AICD given advance dementia  - pt appears euvolemic : hold lasix    - Resume lopressor 12.5 mg daily , tele reviewed HR  .     - Hold ACEI/ARB for now 2/2 to JULIO    -monitor BMP  /I/O    3. Mod- Sev AS : known,  evidence by Echo results as mentioned above , continue to monitor     4. HLD : continue with statin     5. MED FU / GI FU / Renal FU /pysh FU / Neuro f/u.. pending EEG     7. HTN : elevated BP likely secondary to being agitated   -resuming BB as mentioned above   -continue to trend BP/HR    8. DVT PPX

## 2017-05-22 NOTE — PROGRESS NOTE ADULT - SUBJECTIVE AND OBJECTIVE BOX
[facility] NEPHROLOGY- PROGRESS NOTE    82 year old male with history of CHF presents with JULIO and hyperkalemia.    REVIEW OF SYSTEMS: Unable to obtain as patient lethargic    No Known Allergies    Hospital Medications: Medications reviewed    VITALS:  T(F): 97.7, Max: 98.6 ( @ 06:55)  HR: 81  BP: 156/73  RR: 17  SpO2: 96%  Wt(kg): --  Height (cm): 167.6 ( @ 09:37)  Weight (kg): 81 ( @ 09:37)  BMI (kg/m2): 28.8 ( @ 09:37)  BSA (m2): 1.91 ( @ 09:37)      PHYSICAL EXAM:    Gen: NAD, lethargic but arousable  Cards: RRR, +S1/S2, +CARLOS  Resp: CTA B/L  GI: soft, NT/ND, NABS  Vascular: no LE edema B/L, R AKA    LABS:      143  |  106  |  33<H>  ----------------------------<  151<H>  4.4   |  20<L>  |  0.95    Ca    9.7      22 May 2017 07:00  Mg     1.8         TPro  7.4  /  Alb  4.4  /  TBili  0.4  /  DBili      /  AST  18  /  ALT  12  /  AlkPhos  50      Creatinine Trend: 0.95 <--, 1.36 <--, 1.69 <--                        12.2   8.65  )-----------( 147      ( 22 May 2017 07:00 )             36.9     Urine Studies:  Urinalysis Basic - ( 21 May 2017 00:25 )    Color: PLYEL / Appearance: CLEAR / S.023 / pH: 6.0  Gluc: NEGATIVE / Ketone: NEGATIVE  / Bili: NEGATIVE / Urobili: 2 E.U.   Blood: NEGATIVE / Protein: 30 / Nitrite: NEGATIVE   Leuk Esterase: NEGATIVE / RBC: 0-2 / WBC 0-2   Sq Epi: OCC / Non Sq Epi:  / Bacteria

## 2017-05-22 NOTE — CONSULT NOTE ADULT - SUBJECTIVE AND OBJECTIVE BOX
Patient is a 82y old  Male who presents with a chief complaint of worsening confusion (21 May 2017 09:37)      HPI:  81 yo male PMHx of CVA with R sided paralysis, Dementia, CAD, CABG, Systolic HF, HTN, HLD and DM p/w worsening confusion, slurred speech and R shoulder pain x 2 weeks. Unable to obtain ROS and History from the pt due to worsening confusion. Obtain ROS, medications and History from Ervin Jean ([facility]+HCP) [phone]. As per son pt's baseline mental status alert, oriented to himself and situation with intermittent confusion, but for past 2 weeks pt's confusion got worse and pt was complaining of right shoulder pain. No reported fever, chills, diaphoresis, chest pain, sob or palpitations. Son reports pt has HHA 24/.   In E.D. pt found in hyperkalemia 5.6 and JULIO, he was given calcium gluconate IV and Normal Saline by ED. (21 May 2017 09:37)      REVIEW OF SYSTEMS: Unable to answer      PAST MEDICAL & SURGICAL HISTORY:  Aortic stenosis, moderate  PAD (peripheral artery disease)  GERD (Gastroesophageal Reflux Disease)  CAD (Coronary Artery Disease)  HTN  DM (Diabetes Mellitus)  CVA (Cerebral Infarction): with right hemiparesis s/p carotididendarterectomy in   S/P AKA (above knee amputation) unilateral, right  S/P CABG X 4:   History of Carotid Endarterectomy      Allergies    No Known Allergies    Intolerances        FAMILY HISTORY:  No pertinent family history in first degree relatives      SOCIAL HISTORY:        MEDICATIONS  (STANDING):  aspirin enteric coated 81milliGRAM(s) Oral daily  citalopram 10milliGRAM(s) Oral daily  pantoprazole    Tablet 40milliGRAM(s) Oral before breakfast  gabapentin 300milliGRAM(s) Oral daily  risperiDONE   Tablet 0.5milliGRAM(s) Oral <User Schedule>  risperiDONE   Tablet 1milliGRAM(s) Oral at bedtime  simvastatin 20milliGRAM(s) Oral at bedtime  sodium chloride 0.9% lock flush 3milliLiter(s) IV Push every 8 hours  heparin  Injectable 5000Unit(s) SubCutaneous every 8 hours    MEDICATIONS  (PRN):  senna 2Tablet(s) Oral at bedtime PRN Constipation  acetaminophen   Tablet. 650milliGRAM(s) Oral every 6 hours PRN mild and moderate pain  haloperidol    Injectable 0.5milliGRAM(s) IV Push every 6 hours PRN Agitation      Vital Signs Last 24 Hrs  T(C): 36.7, Max: 37 ( @ 06:55)  T(F): 98.1, Max: 98.6 ( @ 06:55)  HR: 81 (79 - 97)  BP: 145/65 (145/65 - 165/75)  BP(mean): --  RR: 18 (16 - 18)  SpO2: 98% (96% - 99%)    PHYSICAL EXAM:    GENERAL: NAD, well-groomed, well-developed  HEAD:  Atraumatic, Normocephalic  EYES: EOMI, PERRLA, conjunctiva and sclera clear  ENMT: No tonsillar erythema, exudates, or enlargement; Moist mucous membranes, Good dentition, No lesions  NECK: Supple, No JVD, Normal thyroid  NERVOUS SYSTEM:  Alert & Oriented X3, Good concentration; Motor Strength 5/5 B/L upper and lower extremities; DTRs 2+ intact and symmetric  CHEST/LUNG: Clear to percussion bilaterally; No rales, rhonchi, wheezing, or rubs  HEART: Regular rate and rhythm; No murmurs, rubs, or gallops  ABDOMEN: Soft, Nontender, Nondistended; Bowel sounds present  EXTREMITIES:  2+ Peripheral Pulses, No clubbing, cyanosis, or edema.  RLE AKA stump site c/d/i  LYMPH: No lymphadenopathy noted  SKIN: No rashes or lesions    LABS:  CBC Full  -  ( 22 May 2017 07:00 )  WBC Count : 8.65 K/uL  Hemoglobin : 12.2 g/dL  Hematocrit : 36.9 %  Platelet Count - Automated : 147 K/uL  Mean Cell Volume : 94.4 fL  Mean Cell Hemoglobin : 31.2 pg  Mean Cell Hemoglobin Concentration : 33.1 %  Auto Neutrophil # : x  Auto Lymphocyte # : x  Auto Monocyte # : x  Auto Eosinophil # : x  Auto Basophil # : x  Auto Neutrophil % : x  Auto Lymphocyte % : x  Auto Monocyte % : x  Auto Eosinophil % : x  Auto Basophil % : x          143  |  106  |  33<H>  ----------------------------<  151<H>  4.4   |  20<L>  |  0.95    Ca    9.7      22 May 2017 07:00  Mg     1.8     -    TPro  7.4  /  Alb  4.4  /  TBili  0.4  /  DBili  x   /  AST  18  /  ALT  12  /  AlkPhos  50  -      LIVER FUNCTIONS - ( 21 May 2017 02:15 )  Alb: 4.4 g/dL / Pro: 7.4 g/dL / ALK PHOS: 50 u/L / ALT: 12 u/L / AST: 18 u/L / GGT: x               MICROBIOLOGY:    Blood Cultures:    Patient is a 82y old  Male who presents with a chief complaint of worsening confusion (21 May 2017 09:37)      HPI:  81 yo male PMHx of CVA with R sided paralysis, Dementia, CAD, CABG, Systolic HF, HTN, HLD and DM p/w worsening confusion, slurred speech and R shoulder pain x 2 weeks. Unable to obtain ROS and History from the pt due to worsening confusion. Obtain ROS, medications and History from Ervin Jean ([facility]+HCP) [phone]. As per son pt's baseline mental status alert, oriented to himself and situation with intermittent confusion, but for past 2 weeks pt's confusion got worse and pt was complaining of right shoulder pain. No reported fever, chills, diaphoresis, chest pain, sob or palpitations. Son reports pt has HHA 24/.   In E.D. pt found in hyperkalemia 5.6 and JULIO, he was given calcium gluconate IV and Normal Saline by ED. (21 May 2017 09:37)      REVIEW OF SYSTEMS:    CONSTITUTIONAL: No fever, weight loss, or fatigue  EYES: No eye pain, visual disturbances, or discharge  ENMT:  No sore throat  NECK: No pain or stiffness  RESPIRATORY: No cough, wheezing, chills or hemoptysis; No shortness of breath  CARDIOVASCULAR: No chest pain, palpitations, dizziness, or leg swelling  GASTROINTESTINAL: No abdominal or epigastric pain. No nausea, vomiting, or hematemesis; No diarrhea or constipation. No melena or hematochezia.  GENITOURINARY: No dysuria, frequency, hematuria, or incontinence  NEUROLOGICAL: No headaches, memory loss, loss of strength, numbness, or tremors  SKIN: No itching, burning, rashes, or lesions   LYMPH NODES: No enlarged glands  MUSCULOSKELETAL: No joint pain or swelling; No muscle, back, or extremity pain      PAST MEDICAL & SURGICAL HISTORY:  Aortic stenosis, moderate  PAD (peripheral artery disease)  GERD (Gastroesophageal Reflux Disease)  CAD (Coronary Artery Disease)  HTN  DM (Diabetes Mellitus)  CVA (Cerebral Infarction): with right hemiparesis s/p carotididendarterectomy in 2008  S/P AKA (above knee amputation) unilateral, right  S/P CABG X 4: 1998  History of Carotid Endarterectomy      Allergies    No Known Allergies    Intolerances        FAMILY HISTORY:  No pertinent family history in first degree relatives      SOCIAL HISTORY:        MEDICATIONS  (STANDING):  aspirin enteric coated 81milliGRAM(s) Oral daily  citalopram 10milliGRAM(s) Oral daily  pantoprazole    Tablet 40milliGRAM(s) Oral before breakfast  gabapentin 300milliGRAM(s) Oral daily  risperiDONE   Tablet 0.5milliGRAM(s) Oral <User Schedule>  risperiDONE   Tablet 1milliGRAM(s) Oral at bedtime  simvastatin 20milliGRAM(s) Oral at bedtime  sodium chloride 0.9% lock flush 3milliLiter(s) IV Push every 8 hours  heparin  Injectable 5000Unit(s) SubCutaneous every 8 hours    MEDICATIONS  (PRN):  senna 2Tablet(s) Oral at bedtime PRN Constipation  acetaminophen   Tablet. 650milliGRAM(s) Oral every 6 hours PRN mild and moderate pain  haloperidol    Injectable 0.5milliGRAM(s) IV Push every 6 hours PRN Agitation      Vital Signs Last 24 Hrs  T(C): 36.7, Max: 37 (05-22 @ 06:55)  T(F): 98.1, Max: 98.6 (05-22 @ 06:55)  HR: 81 (79 - 97)  BP: 145/65 (145/65 - 165/75)  BP(mean): --  RR: 18 (16 - 18)  SpO2: 98% (96% - 99%)    PHYSICAL EXAM:    GENERAL: NAD, well-groomed, well-developed  HEAD:  Atraumatic, Normocephalic  EYES: EOMI, PERRLA, conjunctiva and sclera clear  ENMT: No tonsillar erythema, exudates, or enlargement; Moist mucous membranes, Good dentition, No lesions  NECK: Supple, No JVD, Normal thyroid  NERVOUS SYSTEM:  Alert & Oriented X3, Good concentration; Motor Strength 5/5 B/L upper and lower extremities; DTRs 2+ intact and symmetric  CHEST/LUNG: Clear to percussion bilaterally; No rales, rhonchi, wheezing, or rubs  HEART: Regular rate and rhythm; No murmurs, rubs, or gallops  ABDOMEN: Soft, Nontender, Nondistended; Bowel sounds present  EXTREMITIES:  2+ Peripheral Pulses, No clubbing, cyanosis, or edema  LYMPH: No lymphadenopathy noted  SKIN: No rashes or lesions    LABS:  CBC Full  -  ( 22 May 2017 07:00 )  WBC Count : 8.65 K/uL  Hemoglobin : 12.2 g/dL  Hematocrit : 36.9 %  Platelet Count - Automated : 147 K/uL  Mean Cell Volume : 94.4 fL  Mean Cell Hemoglobin : 31.2 pg  Mean Cell Hemoglobin Concentration : 33.1 %  Auto Neutrophil # : x  Auto Lymphocyte # : x  Auto Monocyte # : x  Auto Eosinophil # : x  Auto Basophil # : x  Auto Neutrophil % : x  Auto Lymphocyte % : x  Auto Monocyte % : x  Auto Eosinophil % : x  Auto Basophil % : x      -    143  |  106  |  33<H>  ----------------------------<  151<H>  4.4   |  20<L>  |  0.95    Ca    9.7      22 May 2017 07:00  Mg     1.8     05-    TPro  7.4  /  Alb  4.4  /  TBili  0.4  /  DBili  x   /  AST  18  /  ALT  12  /  AlkPhos  50  05-21      LIVER FUNCTIONS - ( 21 May 2017 02:15 )  Alb: 4.4 g/dL / Pro: 7.4 g/dL / ALK PHOS: 50 u/L / ALT: 12 u/L / AST: 18 u/L / GGT: x                               MICROBIOLOGY:    Blood Cultures:      Urine Cultures:    Urinalysis Basic - ( 21 May 2017 00:25 )    Color: PLYEL / Appearance: CLEAR / S.023 / pH: 6.0  Gluc: NEGATIVE / Ketone: NEGATIVE  / Bili: NEGATIVE / Urobili: 2 E.U.   Blood: NEGATIVE / Protein: 30 / Nitrite: NEGATIVE   Leuk Esterase: NEGATIVE / RBC: 0-2 / WBC 0-2   Sq Epi: OCC / Non Sq Epi: x / Bacteria: x          RADIOLOGY:    EXAM:  CT BRAIN        PROCEDURE DATE:  May 21 2017         INTERPRETATION:    CLINICAL INFORMATION:  Altered mental status, shoulder pain, and   confusion. History of CVA.      TECHNIQUE: Noncontrast axial CT images were acquired through the head.  Two-dimensional sagittal and coronal reformats were obtained    COMPARISON: Most recent prior CT brain from 2016.    FINDINGS:     There is no acute intracranial hemorrhage, extra-axial fluid collection,   hydrocephalus, mass effect or midlineshift.     Unchanged extensive encephalomalacia and gliosis within the bilateral   frontoparietal lobes, left greater than right. A chronic lacunar infarct   is notable within the left basal ganglia.    Cortical sulci and ventricles are enlarged consistent with   age-appropriate involutional change. There is mild periventricular white   matter hypoattenuation statistically compatible with microvascular   changes given calcific atherosclerotic disease of the intracranial   arteries.    Mucosal thickening of the bilateral ethmoid sinuses. Right sphenoid sinus   mucus retention cyst. Calvarium is intact. Significant calcification of   the vertebral basilar system is again noted.    IMPRESSION:   No acute intracranial hemorrhage, mass effect, or midline shift.    Unchanged extensive encephalomalacia and gliosis within the bilateral   frontoparietal lobes, left greater than right.    Unchanged microvascular disease.                      Urine Cultures:    Urinalysis Basic - ( 21 May 2017 00:25 )    Color: PLYEL / Appearance: CLEAR / S.023 / pH: 6.0  Gluc: NEGATIVE / Ketone: NEGATIVE  / Bili: NEGATIVE / Urobili: 2 E.U.   Blood: NEGATIVE / Protein: 30 / Nitrite: NEGATIVE   Leuk Esterase: NEGATIVE / RBC: 0-2 / WBC 0-2   Sq Epi: OCC / Non Sq Epi: x / Bacteria: x                RADIOLOGY: Patient is a 82y old  Male who presents with a chief complaint of worsening confusion (21 May 2017 09:37)      HPI:  81 yo male PMHx of CVA with R sided paralysis, Dementia, CAD, CABG, Systolic HF, HTN, HLD and DM p/w worsening confusion, slurred speech and R shoulder pain x 2 weeks. Unable to obtain ROS and History from the pt due to worsening confusion. Obtain ROS, medications and History from Ervin Jean (SON+HCP) [phone]. As per son pt's baseline mental status alert, oriented to himself and situation with intermittent confusion, but for past 2 weeks pt's confusion got worse and pt was complaining of right shoulder pain. No reported fever, chills, diaphoresis, chest pain, sob or palpitations. Son reports pt has HHA 24/.   In E.D. pt found in hyperkalemia 5.6 and JULIO, he was given calcium gluconate IV and Normal Saline by ED. (21 May 2017 09:37)      REVIEW OF SYSTEMS: Unable to answer      PAST MEDICAL & SURGICAL HISTORY:  Aortic stenosis, moderate  PAD (peripheral artery disease)  GERD (Gastroesophageal Reflux Disease)  CAD (Coronary Artery Disease)  HTN  DM (Diabetes Mellitus)  CVA (Cerebral Infarction): with right hemiparesis s/p carotididendarterectomy in   S/P AKA (above knee amputation) unilateral, right  S/P CABG X 4:   History of Carotid Endarterectomy      Allergies    No Known Allergies    Intolerances        FAMILY HISTORY:  No pertinent family history in first degree relatives      SOCIAL HISTORY:        MEDICATIONS  (STANDING):  aspirin enteric coated 81milliGRAM(s) Oral daily  citalopram 10milliGRAM(s) Oral daily  pantoprazole    Tablet 40milliGRAM(s) Oral before breakfast  gabapentin 300milliGRAM(s) Oral daily  risperiDONE   Tablet 0.5milliGRAM(s) Oral <User Schedule>  risperiDONE   Tablet 1milliGRAM(s) Oral at bedtime  simvastatin 20milliGRAM(s) Oral at bedtime  sodium chloride 0.9% lock flush 3milliLiter(s) IV Push every 8 hours  heparin  Injectable 5000Unit(s) SubCutaneous every 8 hours    MEDICATIONS  (PRN):  senna 2Tablet(s) Oral at bedtime PRN Constipation  acetaminophen   Tablet. 650milliGRAM(s) Oral every 6 hours PRN mild and moderate pain  haloperidol    Injectable 0.5milliGRAM(s) IV Push every 6 hours PRN Agitation      Vital Signs Last 24 Hrs  T(C): 36.7, Max: 37 ( @ 06:55)  T(F): 98.1, Max: 98.6 ( @ 06:55)  HR: 81 (79 - 97)  BP: 145/65 (145/65 - 165/75)  BP(mean): --  RR: 18 (16 - 18)  SpO2: 98% (96% - 99%)    PHYSICAL EXAM:    GENERAL: NAD, well-groomed, well-developed  HEAD:  Atraumatic, Normocephalic  EYES: EOMI, PERRLA, conjunctiva and sclera clear  ENMT: No tonsillar erythema, exudates, or enlargement; Moist mucous membranes, Good dentition, No lesions  NECK: Supple, No JVD, Normal thyroid  NERVOUS SYSTEM:  Alert & Oriented X3, Good concentration; Motor Strength 5/5 B/L upper and lower extremities; DTRs 2+ intact and symmetric  CHEST/LUNG: Clear to percussion bilaterally; No rales, rhonchi, wheezing, or rubs  HEART: Regular rate and rhythm; No murmurs, rubs, or gallops  ABDOMEN: Soft, Nontender, Nondistended; Bowel sounds present  EXTREMITIES:  2+ Peripheral Pulses, No clubbing, cyanosis, or edema.  RLE AKA stump site c/d/i  LYMPH: No lymphadenopathy noted  SKIN: No rashes or lesions    LABS:  CBC Full  -  ( 22 May 2017 07:00 )  WBC Count : 8.65 K/uL  Hemoglobin : 12.2 g/dL  Hematocrit : 36.9 %  Platelet Count - Automated : 147 K/uL  Mean Cell Volume : 94.4 fL  Mean Cell Hemoglobin : 31.2 pg  Mean Cell Hemoglobin Concentration : 33.1 %  Auto Neutrophil # : x  Auto Lymphocyte # : x  Auto Monocyte # : x  Auto Eosinophil # : x  Auto Basophil # : x  Auto Neutrophil % : x  Auto Lymphocyte % : x  Auto Monocyte % : x  Auto Eosinophil % : x  Auto Basophil % : x          143  |  106  |  33<H>  ----------------------------<  151<H>  4.4   |  20<L>  |  0.95    Ca    9.7      22 May 2017 07:00  Mg     1.8     -    TPro  7.4  /  Alb  4.4  /  TBili  0.4  /  DBili  x   /  AST  18  /  ALT  12  /  AlkPhos  50        LIVER FUNCTIONS - ( 21 May 2017 02:15 )  Alb: 4.4 g/dL / Pro: 7.4 g/dL / ALK PHOS: 50 u/L / ALT: 12 u/L / AST: 18 u/L / GGT: x               MICROBIOLOGY:    Blood Cultures:      Urine Cultures:    Urinalysis Basic - ( 21 May 2017 00:25 )    Color: PLYEL / Appearance: CLEAR / S.023 / pH: 6.0  Gluc: NEGATIVE / Ketone: NEGATIVE  / Bili: NEGATIVE / Urobili: 2 E.U.   Blood: NEGATIVE / Protein: 30 / Nitrite: NEGATIVE   Leuk Esterase: NEGATIVE / RBC: 0-2 / WBC 0-2   Sq Epi: OCC / Non Sq Epi: x / Bacteria: x          RADIOLOGY:    EXAM:  CT BRAIN        PROCEDURE DATE:  May 21 2017         INTERPRETATION:    CLINICAL INFORMATION:  Altered mental status, shoulder pain, and   confusion. History of CVA.      TECHNIQUE: Noncontrast axial CT images were acquired through the head.  Two-dimensional sagittal and coronal reformats were obtained    COMPARISON: Most recent prior CT brain from 2016.    FINDINGS:     There is no acute intracranial hemorrhage, extra-axial fluid collection,   hydrocephalus, mass effect or midlineshift.     Unchanged extensive encephalomalacia and gliosis within the bilateral   frontoparietal lobes, left greater than right. A chronic lacunar infarct   is notable within the left basal ganglia.    Cortical sulci and ventricles are enlarged consistent with   age-appropriate involutional change. There is mild periventricular white   matter hypoattenuation statistically compatible with microvascular   changes given calcific atherosclerotic disease of the intracranial   arteries.    Mucosal thickening of the bilateral ethmoid sinuses. Right sphenoid sinus   mucus retention cyst. Calvarium is intact. Significant calcification of   the vertebral basilar system is again noted.    IMPRESSION:   No acute intracranial hemorrhage, mass effect, or midline shift.    Unchanged extensive encephalomalacia and gliosis within the bilateral   frontoparietal lobes, left greater than right.    Unchanged microvascular disease.

## 2017-05-22 NOTE — EEG REPORT - NS EEG TEXT BOX
[facility]  Report of Routine EEG with Video  And Report of DigitalCompressed Spectral Array Analysis    [facility]: [address], [place] [address], Phone: [phone]  [facility]: [address], [place] [address], Phone: [phone]  Office: [address] [place] [address], [place] [address], [place] [address], Phone: [phone]    Patient Name: Juvenal Jean    Age: 82 y  : 1935  Patient ID: -, MRN #: -, Location: [facility]  Referring Physician: Maday Ceja    EEG #: [id number]  Study Date: 2017		    Technical Information:					  On Instrument: -  Placement and Labeling of Electrodes:  The EEG was performed utilizing 20 channels referential EEG connections (coronal over temporal over parasagittal montage) using all standard 10-20 electrode placements with EKG.  Recording was at a sampling rate of 256 samples per second per channel.  Time synchronized digital video recording was done simultaneously with EEG recording.  A low light infrared camera was used for low light recording.  Arturo and seizure detection algorithms were utilized.  CSA Technical Component:  Quantitative EEG analysis using a separate Compressed Spectral Array (CSA) software package was conducted in real-time and run at bedside after set up by the technician, digitally displaying the power of electrographic frequencies included in the 1-30Hz band using a graded color map.  This data was reviewed and interpreted independently, and is reported in a separate section below.    History:  83 YO MALE  HX DEMENTIA AND ACUTE RENAL FAILURE  R/O SEIZURES/AMS    Medication	  No Data.	    Study Interpretation:    FINDINGS:  The background was continuous.  No PDR was present.  The background consisted of low amplitude delta activity, some theta to 6hz.    Sleep Background:  Stage II sleep transients were recorded with rudimentary spindles noted.    Epileptiform Activity:   No epileptiform discharges were present.    Events:  No clinical events were recorded.  No seizures were recorded.    Activation Procedures:   -Hyperventilation was not performed.    -Photic stimulation was not performed.    Artifacts:  Intermittent myogenic and movement artifacts were noted.    ECG:  The heart rate on single channel ECG was predominantly between 60-70 BPM.    Compressed Spectral Array Digital Analysis    FINDINGS:  Compressed Spectral Array (CSA) data was reviewed separately and correlated with the electroencephalographic findings detailed above.  CSA showed a variable spectral pattern.  Areas of increased power in particular were reviewed in detail, and compared with the raw EEG data.  Areas of abrupt increases in spectral power were reviewed to exclude seizures, and were determined to be artifactual in nature.    The relative ratio of the power of delta range frequencies and faster frequencies remained stable over the course of the study.  There was no definitive increase in the relative power in the delta frequency spectrum apparent in the left hemisphere versus the right hemisphere.      Compressed Spectral Array (Digital Analysis) Summary/ Impression:  No persistent hemispheric asymmetry.  Intermittent areas of increased power reviewed, without definite epileptiform activity associated on CSA.      EEG Classification / Summary:  Abnormal Routine EEG Study   Moderate generalized slowing     Clinical Impression:  Findings indicate moderate diffuse or multifocal cerebral dysfunction.  There were no epileptiform abnormalities recorded. [facility]  Report of Routine EEG with Video  And Report of DigitalCompressed Spectral Array Analysis    [facility]: [address] [address], [address], [place] [address], Phone: [phone]  [facility]: [address], [place] [address], Phone: [phone]  Office: [address] [place] [address], [place] [address], [place] [address], Phone: [phone]    Patient Name: Juvenal Jean    Age: 82 y  : 1935  Patient ID: -, MRN #: -, [facility]  Referring Physician: Maday Ceja    EEG #: [id number]  Study Date: 2017		    Technical Information:					  On Instrument: -  Placement and Labeling of Electrodes:  The EEG was performed utilizing 20 channels referential EEG connections (coronal over temporal over parasagittal montage) using all standard 10-20 electrode placements with EKG.  Recording was at a sampling rate of 256 samples per second per channel.  Time synchronized digital video recording was done simultaneously with EEG recording.  A low light infrared camera was used for low light recording.  Arturo and seizure detection algorithms were utilized.  CSA Technical Component:  Quantitative EEG analysis using a separate Compressed Spectral Array (CSA) software package was conducted in real-time and run at bedside after set up by the technician, digitally displaying the power of electrographic frequencies included in the 1-30Hz band using a graded color map.  This data was reviewed and interpreted independently, and is reported in a separate section below.    History:  83 YO MALE  HX DEMENTIA AND ACUTE RENAL FAILURE  R/O SEIZURES/AMS    Medication	  No Data.	    Study Interpretation:    FINDINGS:  The background was continuous.  No PDR was present.  The background consisted of low amplitude irregular asynchronous delta activity, some theta to 6hz.    Sleep Background:  Stage II sleep transients were recorded with rudimentary spindles noted.    Epileptiform Activity:   No epileptiform discharges were present.    Events:  No clinical events were recorded.  No seizures were recorded.    Activation Procedures:   -Hyperventilation was not performed.    -Photic stimulation was not performed.    Artifacts:  Intermittent myogenic and movement artifacts were noted.    ECG:  The heart rate on single channel ECG was predominantly between 60-70 BPM.    Compressed Spectral Array Digital Analysis    FINDINGS:  Compressed Spectral Array (CSA) data was reviewed separately and correlated with the electroencephalographic findings detailed above.  CSA showed a variable spectral pattern.  Areas of increased power in particular were reviewed in detail, and compared with the raw EEG data.  Areas of abrupt increases in spectral power were reviewed to exclude seizures, and were determined to be artifactual in nature.    The relative ratio of the power of delta range frequencies and faster frequencies remained stable over the course of the study.  There was no definitive increase in the relative power in the delta frequency spectrum apparent in the left hemisphere versus the right hemisphere.      Compressed Spectral Array (Digital Analysis) Summary/ Impression:  No persistent hemispheric asymmetry.  Intermittent areas of increased power reviewed, without definite epileptiform activity associated on CSA.      EEG Classification / Summary:  Abnormal Routine EEG Study   Moderate generalized asynchronous slowing     Clinical Impression:  Findings indicate moderate multifocal cerebral dysfunction.  There were no epileptiform abnormalities recorded.

## 2017-05-22 NOTE — OCCUPATIONAL THERAPY INITIAL EVALUATION ADULT - PERTINENT HX OF CURRENT PROBLEM, REHAB EVAL
81 yo male PMHx of CVA with R sided paralysis, Dementia, CAD, CABG, Systolic HF, HTN, HLD and DM p/w worsening confusion, slurred speech and R shoulder pain x 2 weeks, found in JULIO and Hyperkalemia.

## 2017-05-22 NOTE — PROGRESS NOTE ADULT - SUBJECTIVE AND OBJECTIVE BOX
CC  no acute events noted.     PHYSICAL EXAM:  T(C): 36.5, Max: 37 (05-22 @ 06:55)  HR: 81 (62 - 97)  BP: 156/73 (143/55 - 176/80)  RR: 17 (16 - 18)  SpO2: 96% (96% - 99%)  Wt(kg): --  I&O's Summary      Appearance: Normal	  Cardiovascular: Normal S1 S2,RRR, No JVD, 2/6 sys  murmurs  Respiratory: Lungs clear to auscultation	  Gastrointestinal:  Soft, Non-tender, + BS	  Extremities: Normal range of motion, No clubbing, cyanosis or edema  Vascular: Peripheral pulses palpable 2+ bilaterally      MEDICATIONS  (STANDING):  aspirin enteric coated 81milliGRAM(s) Oral daily  citalopram 10milliGRAM(s) Oral daily  pantoprazole    Tablet 40milliGRAM(s) Oral before breakfast  gabapentin 300milliGRAM(s) Oral daily  risperiDONE   Tablet 0.5milliGRAM(s) Oral <User Schedule>  risperiDONE   Tablet 1milliGRAM(s) Oral at bedtime  simvastatin 20milliGRAM(s) Oral at bedtime  sodium chloride 0.9% lock flush 3milliLiter(s) IV Push every 8 hours  heparin  Injectable 5000Unit(s) SubCutaneous every 8 hours      TELEMETRY: 	        LABS:	 	                          12.2   8.65  )-----------( 147      ( 22 May 2017 07:00 )             36.9     05-22    143  |  106  |  33<H>  ----------------------------<  151<H>  4.4   |  20<L>  |  0.95    Ca    9.7      22 May 2017 07:00  Mg     1.8     05-21    TPro  7.4  /  Alb  4.4  /  TBili  0.4  /  DBili  x   /  AST  18  /  ALT  12  /  AlkPhos  50  05-21    proBNP:     Lipid Profile:   HgA1c: CC : no acute events noted.     PHYSICAL EXAM:  T(C): 36.5, Max: 37 (05-22 @ 06:55)  HR: 81 (62 - 97)  BP: 156/73 (143/55 - 176/80)  RR: 17 (16 - 18)  SpO2: 96% (96% - 99%)  Wt(kg): --  I&O's Summary      Appearance: Normal	  Cardiovascular: Normal S1 S2,RRR, No JVD, 2/6 sys  murmurs  Respiratory: Lungs clear to auscultation	  Gastrointestinal:  Soft, Non-tender, + BS	  Extremities: Normal range of motion, No clubbing, cyanosis or edema  Vascular: Peripheral pulses palpable 2+ bilaterally      MEDICATIONS  (STANDING):  aspirin enteric coated 81milliGRAM(s) Oral daily  citalopram 10milliGRAM(s) Oral daily  pantoprazole    Tablet 40milliGRAM(s) Oral before breakfast  gabapentin 300milliGRAM(s) Oral daily  risperiDONE   Tablet 0.5milliGRAM(s) Oral <User Schedule>  risperiDONE   Tablet 1milliGRAM(s) Oral at bedtime  simvastatin 20milliGRAM(s) Oral at bedtime  sodium chloride 0.9% lock flush 3milliLiter(s) IV Push every 8 hours  heparin  Injectable 5000Unit(s) SubCutaneous every 8 hours      TELEMETRY: SR with 2nd degree HB type 1  	        LABS:	 	                          12.2   8.65  )-----------( 147      ( 22 May 2017 07:00 )             36.9     05-22    143  |  106  |  33<H>  ----------------------------<  151<H>  4.4   |  20<L>  |  0.95    Ca    9.7      22 May 2017 07:00  Mg     1.8     05-21    TPro  7.4  /  Alb  4.4  /  TBili  0.4  /  DBili  x   /  AST  18  /  ALT  12  /  AlkPhos  50  05-21    proBNP:     Lipid Profile:   HgA1c:

## 2017-05-22 NOTE — PROGRESS NOTE ADULT - SUBJECTIVE AND OBJECTIVE BOX
Patient is a 82y old  Male who presents with a chief complaint of worsening confusion (21 May 2017 09:37)      INTERVAL HPI/OVERNIGHT EVENTS:  T(C): 36.5, Max: 37 ( @ 06:55)  HR: 81 (62 - 97)  BP: 156/73 (143/55 - 176/80)  RR: 17 (16 - 18)  SpO2: 96% (96% - 99%)  Wt(kg): --  I&O's Summary      LABS:                        12.2   8.65  )-----------( 147      ( 22 May 2017 07:00 )             36.9         143  |  106  |  33<H>  ----------------------------<  151<H>  4.4   |  20<L>  |  0.95    Ca    9.7      22 May 2017 07:00  Mg     1.8         TPro  7.4  /  Alb  4.4  /  TBili  0.4  /  DBili  x   /  AST  18  /  ALT  12  /  AlkPhos  50        Urinalysis Basic - ( 21 May 2017 00:25 )    Color: PLYEL / Appearance: CLEAR / S.023 / pH: 6.0  Gluc: NEGATIVE / Ketone: NEGATIVE  / Bili: NEGATIVE / Urobili: 2 E.U.   Blood: NEGATIVE / Protein: 30 / Nitrite: NEGATIVE   Leuk Esterase: NEGATIVE / RBC: 0-2 / WBC 0-2   Sq Epi: OCC / Non Sq Epi: x / Bacteria: x      CAPILLARY BLOOD GLUCOSE  169 (21 May 2017 16:33)        Urinalysis Basic - ( 21 May 2017 00:25 )    Color: PLYEL / Appearance: CLEAR / S.023 / pH: 6.0  Gluc: NEGATIVE / Ketone: NEGATIVE  / Bili: NEGATIVE / Urobili: 2 E.U.   Blood: NEGATIVE / Protein: 30 / Nitrite: NEGATIVE   Leuk Esterase: NEGATIVE / RBC: 0-2 / WBC 0-2   Sq Epi: OCC / Non Sq Epi: x / Bacteria: x        MEDICATIONS  (STANDING):  aspirin enteric coated 81milliGRAM(s) Oral daily  citalopram 10milliGRAM(s) Oral daily  pantoprazole    Tablet 40milliGRAM(s) Oral before breakfast  gabapentin 300milliGRAM(s) Oral daily  risperiDONE   Tablet 0.5milliGRAM(s) Oral <User Schedule>  risperiDONE   Tablet 1milliGRAM(s) Oral at bedtime  simvastatin 20milliGRAM(s) Oral at bedtime  sodium chloride 0.9% lock flush 3milliLiter(s) IV Push every 8 hours  heparin  Injectable 5000Unit(s) SubCutaneous every 8 hours    MEDICATIONS  (PRN):  senna 2Tablet(s) Oral at bedtime PRN Constipation  acetaminophen   Tablet. 650milliGRAM(s) Oral every 6 hours PRN mild and moderate pain      REVIEW OF SYSTEMS:  CONSTITUTIONAL: No fever, weight loss, or fatigue  EYES: No eye pain, visual disturbances, or discharge  ENMT:  No difficulty hearing, tinnitus, vertigo; No sinus or throat pain  NECK: No pain or stiffness  RESPIRATORY: No cough, wheezing, chills or hemoptysis; No shortness of breath  CARDIOVASCULAR: No chest pain, palpitations, dizziness, or leg swelling  GASTROINTESTINAL: No abdominal or epigastric pain. No nausea, vomiting, or hematemesis; No diarrhea or constipation. No melena or hematochezia.  GENITOURINARY: No dysuria, frequency, hematuria, or incontinence  NEUROLOGICAL: No headaches, memory loss, loss of strength, numbness, or tremors  SKIN: No itching, burning, rashes, or lesions   LYMPH NODES: No enlarged glands  ENDOCRINE: No heat or cold intolerance; No hair loss  MUSCULOSKELETAL: No joint pain or swelling; No muscle, back, or extremity pain  PSYCHIATRIC: No depression, anxiety, mood swings, or difficulty sleeping  HEME/LYMPH: No easy bruising, or bleeding gums  ALLERY AND IMMUNOLOGIC: No hives or eczema    RADIOLOGY & ADDITIONAL TESTS:    Imaging Personally Reviewed:  [ ] YES  [ ] NO    Consultant(s) Notes Reviewed:  [ ] YES  [ ] NO    PHYSICAL EXAM:  GENERAL: NAD, well-groomed, well-developed  HEAD:  Atraumatic, Normocephalic  EYES: EOMI, PERRLA, conjunctiva and sclera clear  ENMT: No tonsillar erythema, exudates, or enlargement; Moist mucous membranes, Good dentition, No lesions  NECK: Supple, No JVD, Normal thyroid  NERVOUS SYSTEM:  Alert & Oriented X3, Good concentration; Motor Strength 5/5 B/L upper and lower extremities; DTRs 2+ intact and symmetric  CHEST/LUNG: Clear to percussion bilaterally; No rales, rhonchi, wheezing, or rubs  HEART: Regular rate and rhythm; No murmurs, rubs, or gallops  ABDOMEN: Soft, Nontender, Nondistended; Bowel sounds present  EXTREMITIES:  2+ Peripheral Pulses, No clubbing, cyanosis, or edema  LYMPH: No lymphadenopathy noted  SKIN: No rashes or lesions    Care Discussed with Consultants/Other Providers [* ] YES  [ ] NO

## 2017-05-22 NOTE — PROGRESS NOTE ADULT - ASSESSMENT
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 EEG  '2. Psych eval  3. rule out underlying infxn

## 2017-05-22 NOTE — PHYSICAL THERAPY INITIAL EVALUATION ADULT - GENERAL OBSERVATIONS, REHAB EVAL
Patient received semi supine in a stretcher in the ED ,(+) cardiac monitor ,alert, confused, not co operative for functional assessment check.

## 2017-05-22 NOTE — PHYSICAL THERAPY INITIAL EVALUATION ADULT - ACTIVE RANGE OF MOTION EXAMINATION, REHAB EVAL
Left LE Active ROM was WFL (within functional limits)/except L shoulder flexion 0- 80 degrees and contracture of L hand/Left UE Active ROM was WFL (within functional limits)

## 2017-05-22 NOTE — ED ADULT NURSE REASSESSMENT NOTE - NS ED NURSE REASSESS COMMENT FT1
pt turned cleaned positioned for comfort. continues to be confused and agitated at this time. tele ngozi banks made aware. nsr on cm. will monitor.

## 2017-05-22 NOTE — OCCUPATIONAL THERAPY INITIAL EVALUATION ADULT - RANGE OF MOTION EXAMINATION, UPPER EXTREMITY
right sh 0-30 flex, elbow -20-90, wris to neutral and hand 1/2 full ext  and left sh 0-70 , elbow 0-95, hand 2/3 full ext, wrist to neutral

## 2017-05-22 NOTE — PROGRESS NOTE ADULT - ASSESSMENT
81 yo male PMHx of CVA with R sided paralysis, Dementia, CAD, CABG, Systolic HF, HTN, HLD and DM p/w worsening confusion, slurred speech and R shoulder pain x 2 weeks, found in JULIO and Hyperkalemia.    +Hyperkalemia-->s/p calcium gluconate IV and Normal Saline by ED  +JULIO-->S/p IV Fluids hydration, holding diuretic and ACEI    Problem/Plan - 1:  ·  Problem: Hyperkalemia.  Plan: tele monitor  s/p IV fluids hydration  recheck BMP  hold KCl and ACEI.     Problem/Plan - 2:  ·  Problem: JULIO (acute kidney injury).  Plan: Monitor electrolytes  Trend BUN/Cr  Hold Nephrotoxic meds  s/p IV fluid hydration.     Problem/Plan - 3:  ·  Problem: Confusion.  Plan: tele monitor  s/p IV fluid hydration  Aspiration and Falls precautions  HOB elevated 30 degrees   neuro consult  Hold Diuretic for now.   ID consult to ro sepsis    Problem/Plan - 4:  ·  Problem: GERD (Gastroesophageal Reflux Disease).  Plan: started on Protonix 40mg po daily.     Problem/Plan - 5:  ·  Problem: HTN (hypertension).  Plan: Monitor BP daily  DASH diet.     Problem/Plan - 6:  Problem: Need for prophylactic measure. Plan: Heparin 5000 units SC Q8h.

## 2017-05-22 NOTE — CONSULT NOTE ADULT - ASSESSMENT
83 yo male PMHx of CVA with R sided paralysis, Dementia, CAD, CABG, Systolic HF, HTN, HLD and DM p/w worsening confusion, slurred speech and R shoulder pain x 2 weeks, found in JULIO and Hyperkalemia.    +Hyperkalemia-->s/p calcium gluconate IV and Normal Saline by ED  +JULIO-->S/p IV Fluids hydration, holding diuretic and ACEI    Recommend:    1.  Evaluate for underlying infectious issues.  No fever or WBC to suggest septic process.  Unable to assess on clinical exam for infectious focus as patient is lethargic.    2.  F/u blood cultures, Ucx, UA and chest xray.  Check RVP  3.  Hold off on antibiotics at this time.  4.  Continue to monitor wbc and temp curve.

## 2017-05-22 NOTE — PHYSICAL THERAPY INITIAL EVALUATION ADULT - ADDITIONAL COMMENTS
unable to obtain information from the patient due to patient confused ,as per chart review patient has 24 /7 HHA services, as per previous OT evaluation  chart review patient is non ambulatory for > 20 yrs and has an electric wc  and regular wc .

## 2017-05-22 NOTE — PHYSICAL THERAPY INITIAL EVALUATION ADULT - PERTINENT HX OF CURRENT PROBLEM, REHAB EVAL
This is an 83 yo male PMHx of CVA with R sided paralysis, Dementia, and DM p/w worsening confusion, slurred speech and R shoulder pain x 2 weeks, found in JULIO and Hyperkalemia.

## 2017-05-22 NOTE — PROGRESS NOTE ADULT - SUBJECTIVE AND OBJECTIVE BOX
Patient is a 82y old  Male who presents with a chief complaint of worsening confusion (21 May 2017 09:37)      HPI:  8pt seen and examined, pt unreliable historian, no events noted      Vital Signs Last 24 Hrs  T(C): 36.5, Max: 37 (05-22 @ 06:55)  T(F): 97.7, Max: 98.6 (05-22 @ 06:55)  HR: 81 (62 - 97)  BP: 156/73 (143/55 - 176/80)  BP(mean): --  RR: 17 (16 - 18)  SpO2: 96% (96% - 99%)    Physical Exam    Mental Status- Awake, alert, agitated, not oriented  CN- 2-12 intact  Motor- RLE amputaion, munoz antigravity  Sensory- iresonds to LT  Coordination- deferred  Gait- deferred

## 2017-05-22 NOTE — PHYSICAL THERAPY INITIAL EVALUATION ADULT - CRITERIA FOR SKILLED THERAPEUTIC INTERVENTIONS
Patient is not a candidate for Rest[facility] PT  due to inability to actively participate and also non ambulatory for many years and  is on total care at home with [facility] 24/7 .

## 2017-05-22 NOTE — PROGRESS NOTE ADULT - SUBJECTIVE AND OBJECTIVE BOX
INTERVAL HPI/OVERNIGHT EVENTS:    no n/v/d/c/or abd pain    MEDICATIONS  (STANDING):  aspirin enteric coated 81milliGRAM(s) Oral daily  citalopram 10milliGRAM(s) Oral daily  pantoprazole    Tablet 40milliGRAM(s) Oral before breakfast  gabapentin 300milliGRAM(s) Oral daily  risperiDONE   Tablet 0.5milliGRAM(s) Oral <User Schedule>  risperiDONE   Tablet 1milliGRAM(s) Oral at bedtime  simvastatin 20milliGRAM(s) Oral at bedtime  sodium chloride 0.9% lock flush 3milliLiter(s) IV Push every 8 hours  heparin  Injectable 5000Unit(s) SubCutaneous every 8 hours    MEDICATIONS  (PRN):  senna 2Tablet(s) Oral at bedtime PRN Constipation  acetaminophen   Tablet. 650milliGRAM(s) Oral every 6 hours PRN mild and moderate pain      Allergies    No Known Allergies    Intolerances        Review of Systems:    General:  No wt loss, fevers, chills, night sweats,fatigue,   Eyes:  Good vision, no reported pain  ENT:  No sore throat, pain, runny nose, dysphagia  CV:  No pain, palpitatioins, hypo/hypertension  Resp:  No dyspnea, cough, tachypnea, wheezing  GI:  No pain, No nausea, No vomiting, No diarrhea, No constipatiion, No weight loss, No fever, No pruritis, No rectal bleeding, No tarry stools, No dysphagia,  :  No pain, bleeding, incontinence, nocturia  Muscle:  No pain, weakness  Neuro:  No weakness, tingling, memory problems  Psych:  No fatigue, insomnia, mood problems, depression  Endocrine:  No polyuria, polydypsia, cold/heat intolerance  Heme:  No petechiae, ecchymosis, easy bruisability  Skin:  No rash, tattoos, scars, edema      Vital Signs Last 24 Hrs  T(C): 36.5, Max: 37 (05-22 @ 06:55)  T(F): 97.7, Max: 98.6 (05- @ 06:55)  HR: 81 (81 - 97)  BP: 156/73 (156/73 - 176/80)  BP(mean): --  RR: 17 (16 - 18)  SpO2: 96% (96% - 99%)    PHYSICAL EXAM:    Constitutional: NAD, well-developed, confused  HEENT: EOMI, throat clear  Neck: No LAD, supple  Respiratory: CTA and P  Cardiovascular: S1 and S2, RRR, no M  Gastrointestinal: BS+, soft, NT/ND, neg HSM,  Extremities: No peripheral edema, neg clubing, cyanosis  Vascular: 2+ peripheral pulses  Neurological: A/O x 1, no focal deficits  Psychiatric: Normal mood, normal affect  Skin: No rashes      LABS:                        12.2   8.65  )-----------( 147      ( 22 May 2017 07:00 )             36.9     05-    143  |  106  |  33<H>  ----------------------------<  151<H>  4.4   |  20<L>  |  0.95    Ca    9.7      22 May 2017 07:00  Mg     1.8     -    TPro  7.4  /  Alb  4.4  /  TBili  0.4  /  DBili  x   /  AST  18  /  ALT  12  /  AlkPhos  50  -      Urinalysis Basic - ( 21 May 2017 00:25 )    Color: PLYEL / Appearance: CLEAR / S.023 / pH: 6.0  Gluc: NEGATIVE / Ketone: NEGATIVE  / Bili: NEGATIVE / Urobili: 2 E.U.   Blood: NEGATIVE / Protein: 30 / Nitrite: NEGATIVE   Leuk Esterase: NEGATIVE / RBC: 0-2 / WBC 0-2   Sq Epi: OCC / Non Sq Epi: x / Bacteria: x        RADIOLOGY & ADDITIONAL TESTS:

## 2017-05-22 NOTE — PHYSICAL THERAPY INITIAL EVALUATION ADULT - PASSIVE RANGE OF MOTION EXAMINATION, REHAB EVAL
Right LE Passive ROM was WFL (within functional limits)/Right UE Passive ROM was WFL (within functional limits)/contracture of R hand, R shoulder flexion 0-80 degrees

## 2017-05-23 DIAGNOSIS — F03.90 UNSPECIFIED DEMENTIA, UNSPECIFIED SEVERITY, WITHOUT BEHAVIORAL DISTURBANCE, PSYCHOTIC DISTURBANCE, MOOD DISTURBANCE, AND ANXIETY: ICD-10-CM

## 2017-05-23 LAB
B PERT DNA SPEC QL NAA+PROBE: SIGNIFICANT CHANGE UP
BUN SERPL-MCNC: 28 MG/DL — HIGH (ref 7–23)
C PNEUM DNA SPEC QL NAA+PROBE: NOT DETECTED — SIGNIFICANT CHANGE UP
CALCIUM SERPL-MCNC: 9.8 MG/DL — SIGNIFICANT CHANGE UP (ref 8.4–10.5)
CHLORIDE SERPL-SCNC: 105 MMOL/L — SIGNIFICANT CHANGE UP (ref 98–107)
CO2 SERPL-SCNC: 22 MMOL/L — SIGNIFICANT CHANGE UP (ref 22–31)
CREAT SERPL-MCNC: 0.98 MG/DL — SIGNIFICANT CHANGE UP (ref 0.5–1.3)
FLUAV H1 2009 PAND RNA SPEC QL NAA+PROBE: NOT DETECTED — SIGNIFICANT CHANGE UP
FLUAV H1 RNA SPEC QL NAA+PROBE: NOT DETECTED — SIGNIFICANT CHANGE UP
FLUAV H3 RNA SPEC QL NAA+PROBE: NOT DETECTED — SIGNIFICANT CHANGE UP
FLUAV SUBTYP SPEC NAA+PROBE: SIGNIFICANT CHANGE UP
FLUBV RNA SPEC QL NAA+PROBE: NOT DETECTED — SIGNIFICANT CHANGE UP
GLUCOSE SERPL-MCNC: 145 MG/DL — HIGH (ref 70–99)
HADV DNA SPEC QL NAA+PROBE: NOT DETECTED — SIGNIFICANT CHANGE UP
HCOV 229E RNA SPEC QL NAA+PROBE: NOT DETECTED — SIGNIFICANT CHANGE UP
HCOV HKU1 RNA SPEC QL NAA+PROBE: NOT DETECTED — SIGNIFICANT CHANGE UP
HCOV NL63 RNA SPEC QL NAA+PROBE: NOT DETECTED — SIGNIFICANT CHANGE UP
HCOV OC43 RNA SPEC QL NAA+PROBE: NOT DETECTED — SIGNIFICANT CHANGE UP
HCT VFR BLD CALC: 34.3 % — LOW (ref 39–50)
HGB BLD-MCNC: 11.5 G/DL — LOW (ref 13–17)
HMPV RNA SPEC QL NAA+PROBE: NOT DETECTED — SIGNIFICANT CHANGE UP
HPIV1 RNA SPEC QL NAA+PROBE: NOT DETECTED — SIGNIFICANT CHANGE UP
HPIV2 RNA SPEC QL NAA+PROBE: NOT DETECTED — SIGNIFICANT CHANGE UP
HPIV3 RNA SPEC QL NAA+PROBE: NOT DETECTED — SIGNIFICANT CHANGE UP
HPIV4 RNA SPEC QL NAA+PROBE: NOT DETECTED — SIGNIFICANT CHANGE UP
M PNEUMO DNA SPEC QL NAA+PROBE: NOT DETECTED — SIGNIFICANT CHANGE UP
MCHC RBC-ENTMCNC: 31.5 PG — SIGNIFICANT CHANGE UP (ref 27–34)
MCHC RBC-ENTMCNC: 33.5 % — SIGNIFICANT CHANGE UP (ref 32–36)
MCV RBC AUTO: 94 FL — SIGNIFICANT CHANGE UP (ref 80–100)
PLATELET # BLD AUTO: 143 K/UL — LOW (ref 150–400)
PMV BLD: 12.1 FL — SIGNIFICANT CHANGE UP (ref 7–13)
POTASSIUM SERPL-MCNC: 4.2 MMOL/L — SIGNIFICANT CHANGE UP (ref 3.5–5.3)
POTASSIUM SERPL-SCNC: 4.2 MMOL/L — SIGNIFICANT CHANGE UP (ref 3.5–5.3)
RBC # BLD: 3.65 M/UL — LOW (ref 4.2–5.8)
RBC # FLD: 14.5 % — SIGNIFICANT CHANGE UP (ref 10.3–14.5)
RSV RNA SPEC QL NAA+PROBE: NOT DETECTED — SIGNIFICANT CHANGE UP
RV+EV RNA SPEC QL NAA+PROBE: NOT DETECTED — SIGNIFICANT CHANGE UP
SODIUM SERPL-SCNC: 144 MMOL/L — SIGNIFICANT CHANGE UP (ref 135–145)
WBC # BLD: 6.73 K/UL — SIGNIFICANT CHANGE UP (ref 3.8–10.5)
WBC # FLD AUTO: 6.73 K/UL — SIGNIFICANT CHANGE UP (ref 3.8–10.5)

## 2017-05-23 PROCEDURE — 71010: CPT | Mod: 26

## 2017-05-23 PROCEDURE — 99233 SBSQ HOSP IP/OBS HIGH 50: CPT

## 2017-05-23 RX ORDER — HALOPERIDOL DECANOATE 100 MG/ML
1 INJECTION INTRAMUSCULAR ONCE
Qty: 0 | Refills: 0 | Status: COMPLETED | OUTPATIENT
Start: 2017-05-23 | End: 2017-05-23

## 2017-05-23 RX ORDER — HALOPERIDOL DECANOATE 100 MG/ML
0.5 INJECTION INTRAMUSCULAR ONCE
Qty: 0 | Refills: 0 | Status: COMPLETED | OUTPATIENT
Start: 2017-05-23 | End: 2017-05-23

## 2017-05-23 RX ORDER — FUROSEMIDE 40 MG
20 TABLET ORAL DAILY
Qty: 0 | Refills: 0 | Status: DISCONTINUED | OUTPATIENT
Start: 2017-05-23 | End: 2017-05-25

## 2017-05-23 RX ORDER — METOPROLOL TARTRATE 50 MG
12.5 TABLET ORAL DAILY
Qty: 0 | Refills: 0 | Status: DISCONTINUED | OUTPATIENT
Start: 2017-05-23 | End: 2017-05-27

## 2017-05-23 RX ORDER — TRAMADOL HYDROCHLORIDE 50 MG/1
25 TABLET ORAL ONCE
Qty: 0 | Refills: 0 | Status: DISCONTINUED | OUTPATIENT
Start: 2017-05-23 | End: 2017-05-23

## 2017-05-23 RX ADMIN — SODIUM CHLORIDE 3 MILLILITER(S): 9 INJECTION INTRAMUSCULAR; INTRAVENOUS; SUBCUTANEOUS at 21:50

## 2017-05-23 RX ADMIN — HALOPERIDOL DECANOATE 0.5 MILLIGRAM(S): 100 INJECTION INTRAMUSCULAR at 21:41

## 2017-05-23 RX ADMIN — Medication 12.5 MILLIGRAM(S): at 10:57

## 2017-05-23 RX ADMIN — TRAMADOL HYDROCHLORIDE 25 MILLIGRAM(S): 50 TABLET ORAL at 06:45

## 2017-05-23 RX ADMIN — HALOPERIDOL DECANOATE 0.5 MILLIGRAM(S): 100 INJECTION INTRAMUSCULAR at 03:10

## 2017-05-23 RX ADMIN — HALOPERIDOL DECANOATE 0.5 MILLIGRAM(S): 100 INJECTION INTRAMUSCULAR at 08:43

## 2017-05-23 RX ADMIN — HALOPERIDOL DECANOATE 0.5 MILLIGRAM(S): 100 INJECTION INTRAMUSCULAR at 19:36

## 2017-05-23 RX ADMIN — RISPERIDONE 0.5 MILLIGRAM(S): 4 TABLET ORAL at 10:58

## 2017-05-23 RX ADMIN — Medication 650 MILLIGRAM(S): at 15:59

## 2017-05-23 RX ADMIN — Medication 650 MILLIGRAM(S): at 01:59

## 2017-05-23 RX ADMIN — CITALOPRAM 10 MILLIGRAM(S): 10 TABLET, FILM COATED ORAL at 11:01

## 2017-05-23 RX ADMIN — RISPERIDONE 1 MILLIGRAM(S): 4 TABLET ORAL at 23:08

## 2017-05-23 RX ADMIN — Medication 650 MILLIGRAM(S): at 16:59

## 2017-05-23 RX ADMIN — HALOPERIDOL DECANOATE 0.5 MILLIGRAM(S): 100 INJECTION INTRAMUSCULAR at 04:16

## 2017-05-23 RX ADMIN — Medication 650 MILLIGRAM(S): at 02:59

## 2017-05-23 RX ADMIN — SIMVASTATIN 20 MILLIGRAM(S): 20 TABLET, FILM COATED ORAL at 21:41

## 2017-05-23 RX ADMIN — GABAPENTIN 300 MILLIGRAM(S): 400 CAPSULE ORAL at 11:01

## 2017-05-23 RX ADMIN — SODIUM CHLORIDE 3 MILLILITER(S): 9 INJECTION INTRAMUSCULAR; INTRAVENOUS; SUBCUTANEOUS at 06:04

## 2017-05-23 RX ADMIN — HALOPERIDOL DECANOATE 1 MILLIGRAM(S): 100 INJECTION INTRAMUSCULAR at 13:01

## 2017-05-23 RX ADMIN — Medication 20 MILLIGRAM(S): at 15:59

## 2017-05-23 RX ADMIN — HEPARIN SODIUM 5000 UNIT(S): 5000 INJECTION INTRAVENOUS; SUBCUTANEOUS at 13:01

## 2017-05-23 RX ADMIN — HEPARIN SODIUM 5000 UNIT(S): 5000 INJECTION INTRAVENOUS; SUBCUTANEOUS at 21:42

## 2017-05-23 RX ADMIN — HALOPERIDOL DECANOATE 0.5 MILLIGRAM(S): 100 INJECTION INTRAMUSCULAR at 10:57

## 2017-05-23 RX ADMIN — SODIUM CHLORIDE 3 MILLILITER(S): 9 INJECTION INTRAMUSCULAR; INTRAVENOUS; SUBCUTANEOUS at 13:01

## 2017-05-23 RX ADMIN — Medication 81 MILLIGRAM(S): at 11:01

## 2017-05-23 RX ADMIN — HEPARIN SODIUM 5000 UNIT(S): 5000 INJECTION INTRAVENOUS; SUBCUTANEOUS at 06:08

## 2017-05-23 RX ADMIN — TRAMADOL HYDROCHLORIDE 25 MILLIGRAM(S): 50 TABLET ORAL at 07:45

## 2017-05-23 RX ADMIN — PANTOPRAZOLE SODIUM 40 MILLIGRAM(S): 20 TABLET, DELAYED RELEASE ORAL at 06:08

## 2017-05-23 NOTE — PROGRESS NOTE ADULT - SUBJECTIVE AND OBJECTIVE BOX
Patient is a 82y old  Male who presents with a chief complaint of worsening confusion (21 May 2017 09:37)      HPI:  pt seen and examined, no events noted. pt unreliable historian      Vital Signs Last 24 Hrs  T(C): 36.8, Max: 36.8 (05-23 @ 10:00)  T(F): 98.2, Max: 98.2 (05-23 @ 10:00)  HR: 92 (79 - 98)  BP: 146/62 (140/54 - 148/74)  BP(mean): --  RR: 18 (18 - 18)  SpO2: 100% (97% - 100%)    Physical Exam    Mental Status- Awake, alert, confused, agitated  CN- 2-12 intact  Motor- RUE weakness, R le amputation  Sensory- intact Lt/PP/propriception  Coordination- deferred  Gait- deferred

## 2017-05-23 NOTE — PROGRESS NOTE ADULT - SUBJECTIVE AND OBJECTIVE BOX
Patient is a 82y old  Male who presents with a chief complaint of worsening confusion (21 May 2017 09:37)      INTERVAL HPI/OVERNIGHT EVENTS:  T(C): 36.6, Max: 36.7 (05-22 @ 21:21)  HR: 98 (79 - 98)  BP: 141/73 (140/54 - 148/74)  RR: 18 (18 - 18)  SpO2: 97% (97% - 98%)  Wt(kg): --  I&O's Summary      LABS:                        11.5   6.73  )-----------( 143      ( 23 May 2017 06:00 )             34.3     05-23    144  |  105  |  28<H>  ----------------------------<  145<H>  4.2   |  22  |  0.98    Ca    9.8      23 May 2017 06:00          CAPILLARY BLOOD GLUCOSE            MEDICATIONS  (STANDING):  aspirin enteric coated 81milliGRAM(s) Oral daily  citalopram 10milliGRAM(s) Oral daily  pantoprazole    Tablet 40milliGRAM(s) Oral before breakfast  gabapentin 300milliGRAM(s) Oral daily  risperiDONE   Tablet 0.5milliGRAM(s) Oral <User Schedule>  risperiDONE   Tablet 1milliGRAM(s) Oral at bedtime  simvastatin 20milliGRAM(s) Oral at bedtime  sodium chloride 0.9% lock flush 3milliLiter(s) IV Push every 8 hours  heparin  Injectable 5000Unit(s) SubCutaneous every 8 hours  metoprolol succinate ER 12.5milliGRAM(s) Oral daily    MEDICATIONS  (PRN):  senna 2Tablet(s) Oral at bedtime PRN Constipation  acetaminophen   Tablet. 650milliGRAM(s) Oral every 6 hours PRN mild and moderate pain  haloperidol    Injectable 0.5milliGRAM(s) IV Push every 6 hours PRN Agitation      REVIEW OF SYSTEMS:  CONSTITUTIONAL: No fever, weight loss, or fatigue  EYES: No eye pain, visual disturbances, or discharge  ENMT:  No difficulty hearing, tinnitus, vertigo; No sinus or throat pain  NECK: No pain or stiffness  RESPIRATORY: No cough, wheezing, chills or hemoptysis; No shortness of breath  CARDIOVASCULAR: No chest pain, palpitations, dizziness, or leg swelling  GASTROINTESTINAL: No abdominal or epigastric pain. No nausea, vomiting, or hematemesis; No diarrhea or constipation. No melena or hematochezia.  GENITOURINARY: No dysuria, frequency, hematuria, or incontinence  NEUROLOGICAL: No headaches, memory loss, loss of strength, numbness, or tremors  SKIN: No itching, burning, rashes, or lesions   LYMPH NODES: No enlarged glands  ENDOCRINE: No heat or cold intolerance; No hair loss  MUSCULOSKELETAL: No joint pain or swelling; No muscle, back, or extremity pain  PSYCHIATRIC: No depression, anxiety, mood swings, or difficulty sleeping  HEME/LYMPH: No easy bruising, or bleeding gums  ALLERY AND IMMUNOLOGIC: No hives or eczema    RADIOLOGY & ADDITIONAL TESTS:    Imaging Personally Reviewed:  [ ] YES  [ ] NO    Consultant(s) Notes Reviewed:  [ ] YES  [ ] NO    PHYSICAL EXAM:  GENERAL: NAD, well-groomed, well-developed  HEAD:  Atraumatic, Normocephalic  EYES: EOMI, PERRLA, conjunctiva and sclera clear  ENMT: No tonsillar erythema, exudates, or enlargement; Moist mucous membranes, Good dentition, No lesions  NECK: Supple, No JVD, Normal thyroid  NERVOUS SYSTEM:  Alert & Oriented X3, Good concentration; Motor Strength 5/5 B/L upper and lower extremities; DTRs 2+ intact and symmetric  CHEST/LUNG: Clear to percussion bilaterally; No rales, rhonchi, wheezing, or rubs  HEART: Regular rate and rhythm; No murmurs, rubs, or gallops  ABDOMEN: Soft, Nontender, Nondistended; Bowel sounds present  EXTREMITIES:  2+ Peripheral Pulses, No clubbing, cyanosis, or edema  LYMPH: No lymphadenopathy noted  SKIN: No rashes or lesions    Care Discussed with Consultants/Other Providers [. ] YES  [ ] NO

## 2017-05-23 NOTE — PROGRESS NOTE ADULT - ASSESSMENT
83 yo male PMHx of CVA with R sided paralysis, Dementia, CAD, CABG, Systolic HF, HTN, HLD and DM p/w worsening confusion, slurred speech and R shoulder pain x 2 weeks, found in JULIO and Hyperkalemia.    +Hyperkalemia-->s/p calcium gluconate IV and Normal Saline by ED  +JULIO-->S/p IV Fluids hydration, holding diuretic and ACEI    Recommend:    1.  Evaluate for underlying infectious issues.  No fever or WBC to suggest septic process. No focal findings on clinical exam  2.  F/u blood cultures, Ucx, UA and chest xray.  Check RVP  3.  Hold off on antibiotics at this time.  4.  Continue to monitor wbc and temp curve.

## 2017-05-23 NOTE — PROGRESS NOTE BEHAVIORAL HEALTH - SUMMARY
81 yo male with PMHX of CVA, CAD, HTN, DM, HLD, dementia presented to ED for worsening rt shoulder pain and 1-2 week worsening confusion and slurred speech, pt found to have JULIO w/ hyperkalemia. Psych was consulted for worsening confusion per son. Patient has been intermittently agitated while admitted and requiring PRN anti-psychotics in addition to standing Risperdal.

## 2017-05-23 NOTE — PROGRESS NOTE BEHAVIORAL HEALTH - AXIS III
CAD S/P CABG, ischemic cardiomyopathy with severe LV dysfunction, CVA with residual right sided paralysis, HTN, HLD, DM, PAD S/P right AKA

## 2017-05-23 NOTE — PROGRESS NOTE BEHAVIORAL HEALTH - NSBHCHARTREVIEWVS_PSY_A_CORE FT
Vital Signs Last 24 Hrs  T(C): 36.8, Max: 36.8 (05-23 @ 10:00)  T(F): 98.2, Max: 98.2 (05-23 @ 10:00)  HR: 92 (79 - 98)  BP: 146/62 (140/54 - 146/62)  BP(mean): --  RR: 18 (18 - 18)  SpO2: 100% (97% - 100%)

## 2017-05-23 NOTE — PROGRESS NOTE ADULT - SUBJECTIVE AND OBJECTIVE BOX
[facility] NEPHROLOGY- PROGRESS NOTE    82 year old male with history of CHF presents with JULIO and hyperkalemia.    REVIEW OF SYSTEMS:  Gen: no changes in weight  Cards: no chest pain  Resp: no dyspnea  GI: no nausea or vomiting or diarrhea  Vascular: no LE edema    No Known Allergies    Hospital Medications: Medications reviewed    VITALS:  T(F): 98.2, Max: 98.2 ( @ 10:00)  HR: 92  BP: 146/62  RR: 18  SpO2: 100%  Wt(kg): --  Height (cm): 167.6 ( @ 09:37)  Weight (kg): 81 ( @ 09:37)  BMI (kg/m2): 28.8 ( @ 09:37)  BSA (m2): 1.91 ( @ 09:37)      PHYSICAL EXAM:    Gen: NAD, calm  Cards: RRR, +S1/S2, +CARLOS  Resp: CTA B/L  GI: soft, NT/ND, NABS  Vascular: no LE edema B/L, R AKA    LABS:      144  |  105  |  28<H>  ----------------------------<  145<H>  4.2   |  22  |  0.98    Ca    9.8      23 May 2017 06:00      Creatinine Trend: 0.98 <--, 0.95 <--, 1.36 <--, 1.69 <--                        11.5   6.73  )-----------( 143      ( 23 May 2017 06:00 )             34.3     Urine Studies:  Urinalysis Basic - ( 21 May 2017 00:25 )    Color: PLYEL / Appearance: CLEAR / S.023 / pH: 6.0  Gluc: NEGATIVE / Ketone: NEGATIVE  / Bili: NEGATIVE / Urobili: 2 E.U.   Blood: NEGATIVE / Protein: 30 / Nitrite: NEGATIVE   Leuk Esterase: NEGATIVE / RBC: 0-2 / WBC 0-2   Sq Epi: OCC / Non Sq Epi:  / Bacteria:

## 2017-05-23 NOTE — PROGRESS NOTE ADULT - SUBJECTIVE AND OBJECTIVE BOX
CC: Eri sumner events    TELEMETRY:     PHYSICAL EXAM:    T(C): 36.8, Max: 36.8 (05-23 @ 10:00)  HR: 92 (79 - 98)  BP: 146/62 (140/54 - 146/62)  RR: 18 (18 - 18)  SpO2: 100% (97% - 100%)  Wt(kg): --  I&O's Summary      Appearance: Normal	  Cardiovascular: Normal S1 S2,RRR, + SM  Respiratory: Lungs clear to auscultation	  Gastrointestinal:  Soft, Non-tender, + BS	  Extremities: Normal range of motion, No clubbing, cyanosis or edema         LABS:	 	    CARDIAC MARKERS:                                11.5   6.73  )-----------( 143      ( 23 May 2017 06:00 )             34.3     05-23    144  |  105  |  28<H>  ----------------------------<  145<H>  4.2   |  22  |  0.98    Ca    9.8      23 May 2017 06:00

## 2017-05-23 NOTE — PROGRESS NOTE ADULT - ASSESSMENT
A/P Severe CM/Ch. systolic CHF         JULIO    -CV stable  -resume low dose oral lasix  -BB  -resume low dose acei  -No ICD in light of adv dementia

## 2017-05-23 NOTE — PROGRESS NOTE BEHAVIORAL HEALTH - NSBHCHARTREVIEWLAB_PSY_A_CORE FT
11.5   6.73  )-----------( 143      ( 23 May 2017 06:00 )             34.3     05-23    144  |  105  |  28<H>  ----------------------------<  145<H>  4.2   |  22  |  0.98    Ca    9.8      23 May 2017 06:00

## 2017-05-23 NOTE — PROGRESS NOTE BEHAVIORAL HEALTH - RISK ASSESSMENT
Risk factors- s/p CVA with neurologic motor deficits, neurodegenerative disorder with cognitive decline, inability to care for himself, history of aggression  Protective factors- no psychiatric history, no suicide attempts, supportive family, 24 hr healthcare aid

## 2017-05-23 NOTE — PROGRESS NOTE BEHAVIORAL HEALTH - NSBHFUPINTERVALHXFT_PSY_A_CORE
Patient required PRN Haldol 0.5 mg IVP @21:18 and @3:10 for agitation. Upon presentation, Patient required PRN Haldol 0.5 mg IVP @21:18 and @3:10 for agitation. Upon presentation, patient is stating he has rectal pain. Per RN, patient has been c/o pain and has received Tramadol 25 mg and Tylenol for pain, but patient does not remember. Patient states it is 1980, and that he might be at [facility], unsure of why he is here. Patient repeatedly asking for help and bothered by his rectum. Denies hallucinations and referential ideation.

## 2017-05-23 NOTE — PROGRESS NOTE ADULT - SUBJECTIVE AND OBJECTIVE BOX
Infectious Diseases progress note:    Subjective:  Awake, alert, NAD.  Confused.  No specific complaints, (+) BM    ROS:  CONSTITUTIONAL:  Negative fever or chills, feels well  EYES:  Negative  blurry vision or double vision  CARDIOVASCULAR:  Negative for chest pain or palpitations  RESPIRATORY:  Negative for cough, wheezing, or SOB   GASTROINTESTINAL:  Negative for nausea, vomiting, diarrhea, constipation, or abdominal pain  EXTREMITIES:  No cyanosis/clubbing/edema  GENITOURINARY:  Negative frequency, urgency or dysuria  NEUROLOGIC:  No headache, confusion, dizziness, lightheadedness    Allergies    No Known Allergies    Intolerances        ANTIBIOTICS/RELEVANT:  antimicrobials    immunologic:    OTHER:  aspirin enteric coated 81milliGRAM(s) Oral daily  citalopram 10milliGRAM(s) Oral daily  pantoprazole    Tablet 40milliGRAM(s) Oral before breakfast  gabapentin 300milliGRAM(s) Oral daily  risperiDONE   Tablet 0.5milliGRAM(s) Oral <User Schedule>  risperiDONE   Tablet 1milliGRAM(s) Oral at bedtime  senna 2Tablet(s) Oral at bedtime PRN  simvastatin 20milliGRAM(s) Oral at bedtime  heparin  Injectable 5000Unit(s) SubCutaneous every 8 hours  acetaminophen   Tablet. 650milliGRAM(s) Oral every 6 hours PRN  haloperidol    Injectable 0.5milliGRAM(s) IV Push every 6 hours PRN  metoprolol succinate ER 12.5milliGRAM(s) Oral daily  furosemide    Tablet 20milliGRAM(s) Oral daily      Objective:  Vital Signs Last 24 Hrs  T(C): 37, Max: 37 (05-23 @ 17:25)  T(F): 98.6, Max: 98.6 (05-23 @ 17:25)  HR: 69 (69 - 98)  BP: 132/- (132/- - 146/62)  BP(mean): --  RR: 18 (18 - 18)  SpO2: 97% (97% - 100%)    PHYSICAL EXAM:  Constitutional:NAD  Eyes:KRISSY, EOMI  Ear/Nose/Throat: no oral lesion, no sinus tenderness on percussion	  Neck:no JVD, no lymphadenopathy, supple  Respiratory: CTA ginger  Cardiovascular: S1S2 RRR, no murmurs  Gastrointestinal:soft, (+) BS, no HSM  Extremities:no e/e/c.  Rt AKA stump site c/d/i        LABS:                        11.5   6.73  )-----------( 143      ( 23 May 2017 06:00 )             34.3     05-23    144  |  105  |  28<H>  ----------------------------<  145<H>  4.2   |  22  |  0.98    Ca    9.8      23 May 2017 06:00    MICROBIOLOGY:    Infectious Diseases progress note:    Subjective:    ROS:  CONSTITUTIONAL:  Negative fever or chills, feels well  EYES:  Negative  blurry vision or double vision  CARDIOVASCULAR:  Negative for chest pain or palpitations  RESPIRATORY:  Negative for cough, wheezing, or SOB   GASTROINTESTINAL:  Negative for nausea, vomiting, diarrhea, constipation, or abdominal pain  EXTREMITIES:  No cyanosis/clubbing/edema  GENITOURINARY:  Negative frequency, urgency or dysuria  NEUROLOGIC:  No headache, confusion, dizziness, lightheadedness    Allergies    No Known Allergies    Intolerances        ANTIBIOTICS/RELEVANT:  antimicrobials    immunologic:    OTHER:  aspirin enteric coated 81milliGRAM(s) Oral daily  citalopram 10milliGRAM(s) Oral daily  pantoprazole    Tablet 40milliGRAM(s) Oral before breakfast  gabapentin 300milliGRAM(s) Oral daily  risperiDONE   Tablet 0.5milliGRAM(s) Oral <User Schedule>  risperiDONE   Tablet 1milliGRAM(s) Oral at bedtime  senna 2Tablet(s) Oral at bedtime PRN  simvastatin 20milliGRAM(s) Oral at bedtime  heparin  Injectable 5000Unit(s) SubCutaneous every 8 hours  acetaminophen   Tablet. 650milliGRAM(s) Oral every 6 hours PRN  haloperidol    Injectable 0.5milliGRAM(s) IV Push every 6 hours PRN  metoprolol succinate ER 12.5milliGRAM(s) Oral daily  furosemide    Tablet 20milliGRAM(s) Oral daily      Objective:  Vital Signs Last 24 Hrs  T(C): 37, Max: 37 (05-23 @ 17:25)  T(F): 98.6, Max: 98.6 (05-23 @ 17:25)  HR: 69 (69 - 98)  BP: 132/- (132/- - 146/62)  BP(mean): --  RR: 18 (18 - 18)  SpO2: 97% (97% - 100%)    PHYSICAL EXAM:  Constitutional:NAD  Eyes:KRISSY, EOMI  Ear/Nose/Throat: no oral lesion, no sinus tenderness on percussion	  Neck:no JVD, no lymphadenopathy, supple  Respiratory: CTA ginger  Cardiovascular: S1S2 RRR, no murmurs  Gastrointestinal:soft, (+) BS, no HSM  Extremities:no e/e/c        LABS:                        11.5   6.73  )-----------( 143      ( 23 May 2017 06:00 )             34.3     05-23    144  |  105  |  28<H>  ----------------------------<  145<H>  4.2   |  22  |  0.98    Ca    9.8      23 May 2017 06:00        MICROBIOLOGY:      Rapid Respiratory Viral Panel (05.23.17 @ 13:30)    229E Coronovirus (RapRVP): NOT DETECTED    HKU1 Coronovirus (RapRVP): NOT DETECTED    NL63 Coronovirus (RapRVP): NOT DETECTED    OC43 Coronovirus (RapRVP): NOT DETECTED    Entero/Rhinovirus (RapRVP): NOT DETECTED    Adenovirus (RapRVP): NOT DETECTED    hMPV (RapRVP): NOT DETECTED    Influenza A (RapRVP): NOT DETECTED (any subtype)    Influenza AH1 2009 (RapRVP): NOT DETECTED    Influenza AH1 (RapRVP): NOT DETECTED    Influenza AH3 (RapRVP): NOT DETECTED    Influenza B (RapRVP): NOT DETECTED    Parainfluenza 1 (RapRVP): NOT DETECTED    Parainfluenza 2 (RapRVP): NOT DETECTED    Parainfluenza 3 (RapRVP): NOT DETECTED    Parainfluenza 4 (RapRVP): NOT DETECTED    Resp Syncytial Virus (RapRVP): NOT DETECTED    Bordetella pertussis (RapRVP): NOT DETECTED    Chlamydia pneumoniae (RapRVP): NOT DETECTED    Mycoplasma pneumoniae (RapRVP): NOT DETECTED This nucleic acid amplification assay was performed using  the [employer]. The following pathogens are tested  for: Adenovirus, Coronavirus 229E, Coronavirus HKU1,  Coronavirus NL63, Coronavirus OC43, Human Metapneumovirus  (HMPV), Rhinovirus/Enterovirus, Influenza A H1, Influenza A  H1 2009 (Pandemic H1 2009), Influenza A H3, Influenza A (Flu  A) subtype not identified, Influenza B, Parainfluenza Virus  (types 1, 2, 3, 4), Respiratory Syncytial Virus (RSV),  Bordetella pertussis, Chlamydophila pneumoniae, and  Mycoplasma pneumoniae. A negative FilmArray result does not  always exclude the possibility of viral or bacterial  infection. Laboratory results should always be interpreted  in the context of clinical findings.          RADIOLOGY & ADDITIONAL STUDIES:      EXAM:  RAD CHEST PORTABLE IMMEDIATE        PROCEDURE DATE:  May 23 2017         INTERPRETATION:  CLINICAL INDICATION: Rule out infection.    TECHNIQUE: Frontal view of the chest dated 5/23/2017    COMPARISON: X-Ray of the chest dated 9/7/2016    FINDINGS:  Study limited secondary to patient rotation. Status post sternotomy.   Surgical clips overlying the left hemithorax. Lungs are clear. No pleural   effusion or pneumothorax noted. Cardiac silhouette cannot be evaluated on   this AP projection.    IMPRESSION:  Clear lungs.

## 2017-05-23 NOTE — PROGRESS NOTE ADULT - ASSESSMENT
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 EEG neg for seizures  '2. Psych eval  3. rule out underlying infxn

## 2017-05-23 NOTE — PROGRESS NOTE ADULT - SUBJECTIVE AND OBJECTIVE BOX
INTERVAL HPI/OVERNIGHT EVENTS:    pt poor historian but reports no n/v/d/c or abdominal pain. +flatus +nonbloody bm    MEDICATIONS  (STANDING):  aspirin enteric coated 81milliGRAM(s) Oral daily  citalopram 10milliGRAM(s) Oral daily  pantoprazole    Tablet 40milliGRAM(s) Oral before breakfast  gabapentin 300milliGRAM(s) Oral daily  risperiDONE   Tablet 0.5milliGRAM(s) Oral <User Schedule>  risperiDONE   Tablet 1milliGRAM(s) Oral at bedtime  simvastatin 20milliGRAM(s) Oral at bedtime  sodium chloride 0.9% lock flush 3milliLiter(s) IV Push every 8 hours  heparin  Injectable 5000Unit(s) SubCutaneous every 8 hours  metoprolol succinate ER 12.5milliGRAM(s) Oral daily    MEDICATIONS  (PRN):  senna 2Tablet(s) Oral at bedtime PRN Constipation  acetaminophen   Tablet. 650milliGRAM(s) Oral every 6 hours PRN mild and moderate pain  haloperidol    Injectable 0.5milliGRAM(s) IV Push every 6 hours PRN Agitation      Allergies    No Known Allergies    Intolerances        Review of Systems:    General:  No wt loss, fevers, chills, night sweats,fatigue,   Eyes:  Good vision, no reported pain  ENT:  No sore throat, pain, runny nose, dysphagia  CV:  No pain, palpitatioins, hypo/hypertension  Resp:  No dyspnea, cough, tachypnea, wheezing  GI:  No pain, No nausea, No vomiting, No diarrhea, No constipatiion, No weight loss, No fever, No pruritis, No rectal bleeding, No tarry stools, No dysphagia,  :  No pain, bleeding, incontinence, nocturia  Muscle:  No pain, weakness  Neuro:  No weakness, tingling, memory problems  Psych:  No fatigue, insomnia, mood problems, depression  Endocrine:  No polyuria, polydypsia, cold/heat intolerance  Heme:  No petechiae, ecchymosis, easy bruisability  Skin:  No tattoos, scars, edema      Vital Signs Last 24 Hrs  T(C): 36.8, Max: 36.8 (05-23 @ 10:00)  T(F): 98.2, Max: 98.2 (05-23 @ 10:00)  HR: 92 (79 - 98)  BP: 146/62 (140/54 - 148/74)  BP(mean): --  RR: 18 (18 - 18)  SpO2: 100% (97% - 100%)    PHYSICAL EXAM:    Constitutional: NAD, well-developed  HEENT: EOMI, throat clear  Neck: No LAD, supple  Respiratory: CTA and P  Cardiovascular: S1 and S2, RRR, no M  Gastrointestinal: BS+, soft, NT/ND, neg HSM,  Extremities: No peripheral edema, neg clubing, cyanosis  Vascular: 2+ peripheral pulses  Neurological: A/O x 1-2, no focal deficits  Psychiatric: Normal mood, normal affect  Skin: No jaundice      LABS:                        11.5   6.73  )-----------( 143      ( 23 May 2017 06:00 )             34.3     05-23    144  |  105  |  28<H>  ----------------------------<  145<H>  4.2   |  22  |  0.98    Ca    9.8      23 May 2017 06:00            RADIOLOGY & ADDITIONAL TESTS:

## 2017-05-23 NOTE — PROGRESS NOTE ADULT - ASSESSMENT
83 yo male PMHx of CVA with R sided paralysis, Dementia, CAD, CABG, Systolic HF, HTN, HLD and DM p/w worsening confusion, slurred speech and R shoulder pain x 2 weeks, found in JULIO and Hyperkalemia.    +Hyperkalemia-->s/p calcium gluconate IV and Normal Saline by ED  +JULIO-->S/p IV Fluids hydration, holding diuretic and ACEI    Problem/Plan - 1:  ·  Problem: Hyperkalemia.  Plan: tele monitor  s/p IV fluids hydration  recheck BMP  hold KCl and ACEI.     Problem/Plan - 2:  ·  Problem: JULIO (acute kidney injury).  Plan: Monitor electrolytes  Trend BUN/Cr  Hold Nephrotoxic meds  s/p IV fluid hydration.     Problem/Plan - 3:  ·  Problem: Confusion.  Plan: tele monitor  s/p IV fluid hydration  Aspiration and Falls precautions  HOB elevated 30 degrees   neuro consult  Hold Diuretic for now.   ID consult to ro sepsis    Problem/Plan - 4:  ·  Problem: GERD (Gastroesophageal Reflux Disease).  Plan: started on Protonix 40mg po daily.     Problem/Plan - 5:  ·  Problem: HTN (hypertension).  Plan: Monitor BP daily  DASH diet.

## 2017-05-24 DIAGNOSIS — E87.0 HYPEROSMOLALITY AND HYPERNATREMIA: ICD-10-CM

## 2017-05-24 LAB
BUN SERPL-MCNC: 29 MG/DL — HIGH (ref 7–23)
CALCIUM SERPL-MCNC: 9.9 MG/DL — SIGNIFICANT CHANGE UP (ref 8.4–10.5)
CHLORIDE SERPL-SCNC: 105 MMOL/L — SIGNIFICANT CHANGE UP (ref 98–107)
CO2 SERPL-SCNC: 23 MMOL/L — SIGNIFICANT CHANGE UP (ref 22–31)
CREAT SERPL-MCNC: 0.97 MG/DL — SIGNIFICANT CHANGE UP (ref 0.5–1.3)
GLUCOSE SERPL-MCNC: 183 MG/DL — HIGH (ref 70–99)
HCT VFR BLD CALC: 36.9 % — LOW (ref 39–50)
HGB BLD-MCNC: 12.3 G/DL — LOW (ref 13–17)
MAGNESIUM SERPL-MCNC: 1.8 MG/DL — SIGNIFICANT CHANGE UP (ref 1.6–2.6)
MCHC RBC-ENTMCNC: 31.4 PG — SIGNIFICANT CHANGE UP (ref 27–34)
MCHC RBC-ENTMCNC: 33.3 % — SIGNIFICANT CHANGE UP (ref 32–36)
MCV RBC AUTO: 94.1 FL — SIGNIFICANT CHANGE UP (ref 80–100)
PLATELET # BLD AUTO: 148 K/UL — LOW (ref 150–400)
PMV BLD: 12.5 FL — SIGNIFICANT CHANGE UP (ref 7–13)
POTASSIUM SERPL-MCNC: 4.1 MMOL/L — SIGNIFICANT CHANGE UP (ref 3.5–5.3)
POTASSIUM SERPL-SCNC: 4.1 MMOL/L — SIGNIFICANT CHANGE UP (ref 3.5–5.3)
RBC # BLD: 3.92 M/UL — LOW (ref 4.2–5.8)
RBC # FLD: 14.8 % — HIGH (ref 10.3–14.5)
SODIUM SERPL-SCNC: 146 MMOL/L — HIGH (ref 135–145)
SPECIMEN SOURCE: SIGNIFICANT CHANGE UP
WBC # BLD: 8.36 K/UL — SIGNIFICANT CHANGE UP (ref 3.8–10.5)
WBC # FLD AUTO: 8.36 K/UL — SIGNIFICANT CHANGE UP (ref 3.8–10.5)

## 2017-05-24 RX ORDER — TRAMADOL HYDROCHLORIDE 50 MG/1
25 TABLET ORAL ONCE
Qty: 0 | Refills: 0 | Status: DISCONTINUED | OUTPATIENT
Start: 2017-05-24 | End: 2017-05-24

## 2017-05-24 RX ORDER — NYSTATIN CREAM 100000 [USP'U]/G
1 CREAM TOPICAL THREE TIMES A DAY
Qty: 0 | Refills: 0 | Status: DISCONTINUED | OUTPATIENT
Start: 2017-05-24 | End: 2017-05-31

## 2017-05-24 RX ORDER — LISINOPRIL 2.5 MG/1
2.5 TABLET ORAL DAILY
Qty: 0 | Refills: 0 | Status: DISCONTINUED | OUTPATIENT
Start: 2017-05-24 | End: 2017-05-31

## 2017-05-24 RX ORDER — LIDOCAINE AND PRILOCAINE CREAM 25; 25 MG/G; MG/G
1 CREAM TOPICAL
Qty: 0 | Refills: 0 | Status: DISCONTINUED | OUTPATIENT
Start: 2017-05-24 | End: 2017-05-31

## 2017-05-24 RX ADMIN — NYSTATIN CREAM 1 APPLICATION(S): 100000 CREAM TOPICAL at 21:34

## 2017-05-24 RX ADMIN — SODIUM CHLORIDE 3 MILLILITER(S): 9 INJECTION INTRAMUSCULAR; INTRAVENOUS; SUBCUTANEOUS at 05:04

## 2017-05-24 RX ADMIN — HALOPERIDOL DECANOATE 0.5 MILLIGRAM(S): 100 INJECTION INTRAMUSCULAR at 21:40

## 2017-05-24 RX ADMIN — Medication 20 MILLIGRAM(S): at 05:03

## 2017-05-24 RX ADMIN — TRAMADOL HYDROCHLORIDE 25 MILLIGRAM(S): 50 TABLET ORAL at 23:05

## 2017-05-24 RX ADMIN — RISPERIDONE 0.5 MILLIGRAM(S): 4 TABLET ORAL at 09:54

## 2017-05-24 RX ADMIN — TRAMADOL HYDROCHLORIDE 25 MILLIGRAM(S): 50 TABLET ORAL at 22:36

## 2017-05-24 RX ADMIN — GABAPENTIN 300 MILLIGRAM(S): 400 CAPSULE ORAL at 11:19

## 2017-05-24 RX ADMIN — HEPARIN SODIUM 5000 UNIT(S): 5000 INJECTION INTRAVENOUS; SUBCUTANEOUS at 21:34

## 2017-05-24 RX ADMIN — HALOPERIDOL DECANOATE 0.5 MILLIGRAM(S): 100 INJECTION INTRAMUSCULAR at 05:04

## 2017-05-24 RX ADMIN — RISPERIDONE 1 MILLIGRAM(S): 4 TABLET ORAL at 21:35

## 2017-05-24 RX ADMIN — LISINOPRIL 2.5 MILLIGRAM(S): 2.5 TABLET ORAL at 11:19

## 2017-05-24 RX ADMIN — SIMVASTATIN 20 MILLIGRAM(S): 20 TABLET, FILM COATED ORAL at 21:35

## 2017-05-24 RX ADMIN — TRAMADOL HYDROCHLORIDE 25 MILLIGRAM(S): 50 TABLET ORAL at 09:53

## 2017-05-24 RX ADMIN — Medication 650 MILLIGRAM(S): at 01:39

## 2017-05-24 RX ADMIN — Medication 81 MILLIGRAM(S): at 11:19

## 2017-05-24 RX ADMIN — PANTOPRAZOLE SODIUM 40 MILLIGRAM(S): 20 TABLET, DELAYED RELEASE ORAL at 07:37

## 2017-05-24 RX ADMIN — SODIUM CHLORIDE 3 MILLILITER(S): 9 INJECTION INTRAMUSCULAR; INTRAVENOUS; SUBCUTANEOUS at 21:55

## 2017-05-24 RX ADMIN — NYSTATIN CREAM 1 APPLICATION(S): 100000 CREAM TOPICAL at 13:04

## 2017-05-24 RX ADMIN — Medication 650 MILLIGRAM(S): at 01:05

## 2017-05-24 RX ADMIN — TRAMADOL HYDROCHLORIDE 25 MILLIGRAM(S): 50 TABLET ORAL at 03:32

## 2017-05-24 RX ADMIN — TRAMADOL HYDROCHLORIDE 25 MILLIGRAM(S): 50 TABLET ORAL at 02:57

## 2017-05-24 RX ADMIN — HEPARIN SODIUM 5000 UNIT(S): 5000 INJECTION INTRAVENOUS; SUBCUTANEOUS at 05:04

## 2017-05-24 RX ADMIN — TRAMADOL HYDROCHLORIDE 25 MILLIGRAM(S): 50 TABLET ORAL at 10:50

## 2017-05-24 RX ADMIN — SODIUM CHLORIDE 3 MILLILITER(S): 9 INJECTION INTRAMUSCULAR; INTRAVENOUS; SUBCUTANEOUS at 13:02

## 2017-05-24 RX ADMIN — Medication 650 MILLIGRAM(S): at 07:37

## 2017-05-24 RX ADMIN — NYSTATIN CREAM 1 APPLICATION(S): 100000 CREAM TOPICAL at 05:03

## 2017-05-24 RX ADMIN — Medication 650 MILLIGRAM(S): at 21:40

## 2017-05-24 RX ADMIN — HEPARIN SODIUM 5000 UNIT(S): 5000 INJECTION INTRAVENOUS; SUBCUTANEOUS at 13:04

## 2017-05-24 RX ADMIN — HALOPERIDOL DECANOATE 0.5 MILLIGRAM(S): 100 INJECTION INTRAMUSCULAR at 12:40

## 2017-05-24 RX ADMIN — Medication 650 MILLIGRAM(S): at 08:30

## 2017-05-24 RX ADMIN — Medication 650 MILLIGRAM(S): at 22:12

## 2017-05-24 RX ADMIN — Medication 12.5 MILLIGRAM(S): at 05:03

## 2017-05-24 RX ADMIN — CITALOPRAM 10 MILLIGRAM(S): 10 TABLET, FILM COATED ORAL at 11:19

## 2017-05-24 NOTE — PROGRESS NOTE ADULT - SUBJECTIVE AND OBJECTIVE BOX
CC: pt remains confused    TELEMETRY:     PHYSICAL EXAM:    T(C): 36.7, Max: 37 (05-23 @ 17:25)  HR: 72 (68 - 98)  BP: 127/55 (127/55 - 175/53)  RR: 18 (18 - 18)  SpO2: 98% (97% - 100%)  Wt(kg): --  I&O's Summary      Appearance: Normal	  Cardiovascular: Normal S1 S2,RRR, + SM  Respiratory: Lungs clear to auscultation	  Gastrointestinal:  Soft, Non-tender, + BS	  Extremities: Normal range of motion, No clubbing, cyanosis or edema  Vascular: Peripheral pulses palpable 2+ bilaterally     LABS:	 	                          12.3   8.36  )-----------( 148      ( 24 May 2017 06:30 )             36.9     05-24    146<H>  |  105  |  29<H>  ----------------------------<  183<H>  4.1   |  23  |  0.97    Ca    9.9      24 May 2017 06:30  Mg     1.8     05-24

## 2017-05-24 NOTE — PROGRESS NOTE ADULT - SUBJECTIVE AND OBJECTIVE BOX
[facility] NEPHROLOGY- PROGRESS NOTE    82 year old male with history of CHF presents with JULIO and hyperkalemia. Patient today c/o shoulder pain.    REVIEW OF SYSTEMS:  Gen: no changes in weight  Cards: no chest pain  Resp: no dyspnea  GI: no nausea or vomiting or diarrhea  Vascular: no LE edema    No Known Allergies    Hospital Medications: Medications reviewed    VITALS:  T(F): 98, Max: 98.6 ( @ 17:25)  HR: 91  BP: 169/83  RR: 18  SpO2: 98%  Wt(kg): -    PHYSICAL EXAM:    Gen: NAD, calm  Cards: RRR, +S1/S2, +CARLOS  Resp: CTA B/L  GI: soft, NT/ND, NABS  Vascular: no LE edema B/L, R AKA    LABS:      146<H>  |  105  |  29<H>  ----------------------------<  183<H>  4.1   |  23  |  0.97    Ca    9.9      24 May 2017 06:30  Mg     1.8           Creatinine Trend: 0.97 <--, 0.98 <--, 0.95 <--, 1.36 <--, 1.69 <--                        12.3   8.36  )-----------( 148      ( 24 May 2017 06:30 )             36.9     Urine Studies:  Urinalysis Basic - ( 21 May 2017 00:25 )    Color: PLYEL / Appearance: CLEAR / S.023 / pH: 6.0  Gluc: NEGATIVE / Ketone: NEGATIVE  / Bili: NEGATIVE / Urobili: 2 E.U.   Blood: NEGATIVE / Protein: 30 / Nitrite: NEGATIVE   Leuk Esterase: NEGATIVE / RBC: 0-2 / WBC 0-2   Sq Epi: OCC / Non Sq Epi:  / Bacteria:

## 2017-05-24 NOTE — PROGRESS NOTE ADULT - SUBJECTIVE AND OBJECTIVE BOX
Patient is a 82y old  Male who presents with a chief complaint of worsening confusion (21 May 2017 09:37), very confused and restless today      INTERVAL HPI/OVERNIGHT EVENTS:  T(C): 36.7, Max: 37 (05-23 @ 17:25)  HR: 91 (68 - 98)  BP: 169/83 (132/- - 175/53)  RR: 18 (18 - 18)  SpO2: 98% (97% - 100%)  Wt(kg): --  I&O's Summary      LABS:                        12.3   8.36  )-----------( 148      ( 24 May 2017 06:30 )             36.9     05-24    146<H>  |  105  |  29<H>  ----------------------------<  183<H>  4.1   |  23  |  0.97    Ca    9.9      24 May 2017 06:30  Mg     1.8     05-24          CAPILLARY BLOOD GLUCOSE  188 (23 May 2017 21:56)            MEDICATIONS  (STANDING):  aspirin enteric coated 81milliGRAM(s) Oral daily  citalopram 10milliGRAM(s) Oral daily  pantoprazole    Tablet 40milliGRAM(s) Oral before breakfast  gabapentin 300milliGRAM(s) Oral daily  risperiDONE   Tablet 0.5milliGRAM(s) Oral <User Schedule>  risperiDONE   Tablet 1milliGRAM(s) Oral at bedtime  simvastatin 20milliGRAM(s) Oral at bedtime  sodium chloride 0.9% lock flush 3milliLiter(s) IV Push every 8 hours  heparin  Injectable 5000Unit(s) SubCutaneous every 8 hours  metoprolol succinate ER 12.5milliGRAM(s) Oral daily  furosemide    Tablet 20milliGRAM(s) Oral daily  nystatin Powder 1Application(s) Topical three times a day  lisinopril 2.5milliGRAM(s) Oral daily  traMADol 25milliGRAM(s) Oral once    MEDICATIONS  (PRN):  senna 2Tablet(s) Oral at bedtime PRN Constipation  acetaminophen   Tablet. 650milliGRAM(s) Oral every 6 hours PRN mild and moderate pain  haloperidol    Injectable 0.5milliGRAM(s) IV Push every 6 hours PRN Agitation      REVIEW OF SYSTEMS:  CONSTITUTIONAL: No fever, weight loss, or fatigue  EYES: No eye pain, visual disturbances, or discharge  ENMT:  No difficulty hearing, tinnitus, vertigo; No sinus or throat pain  NECK: No pain or stiffness  RESPIRATORY: No cough, wheezing, chills or hemoptysis; No shortness of breath  CARDIOVASCULAR: No chest pain, palpitations, dizziness, or leg swelling  GASTROINTESTINAL: No abdominal or epigastric pain. No nausea, vomiting, or hematemesis; No diarrhea or constipation. No melena or hematochezia.  GENITOURINARY: No dysuria, frequency, hematuria, or incontinence  NEUROLOGICAL: No headaches, memory loss, loss of strength, numbness, or tremors  SKIN: No itching, burning, rashes, or lesions   LYMPH NODES: No enlarged glands  ENDOCRINE: No heat or cold intolerance; No hair loss  MUSCULOSKELETAL: No joint pain or swelling; No muscle, back, or extremity pain  PSYCHIATRIC: No depression, anxiety, mood swings, or difficulty sleeping  HEME/LYMPH: No easy bruising, or bleeding gums  ALLERY AND IMMUNOLOGIC: No hives or eczema    RADIOLOGY & ADDITIONAL TESTS:    Imaging Personally Reviewed:  [ ] YES  [ ] NO    Consultant(s) Notes Reviewed:  [ ] YES  [ ] NO    PHYSICAL EXAM:  GENERAL: NAD, well-groomed, well-developed  HEAD:  Atraumatic, Normocephalic  EYES: EOMI, PERRLA, conjunctiva and sclera clear  ENMT: No tonsillar erythema, exudates, or enlargement; Moist mucous membranes, Good dentition, No lesions  NECK: Supple, No JVD, Normal thyroid  NERVOUS SYSTEM:  Alert & Oriented X3, Good concentration; Motor Strength 5/5 B/L upper and lower extremities; DTRs 2+ intact and symmetric  CHEST/LUNG: Clear to percussion bilaterally; No rales, rhonchi, wheezing, or rubs  HEART: Regular rate and rhythm; No murmurs, rubs, or gallops  ABDOMEN: Soft, Nontender, Nondistended; Bowel sounds present  EXTREMITIES:  2+ Peripheral Pulses, No clubbing, cyanosis, or edema  LYMPH: No lymphadenopathy noted  SKIN: No rashes or lesions    Care Discussed with Consultants/Other Providers [* ] YES  [ ] NO

## 2017-05-24 NOTE — PROGRESS NOTE ADULT - ASSESSMENT
A/P Severe CM/Ch. systolic CHF         JULIO    -CV stable  -cont low dose oral lasix, if BUN /CR rise then change to 20mg PO Q48 hrs  -BB, acei  -No ICD in light of adv dementia

## 2017-05-24 NOTE — PROGRESS NOTE ADULT - ASSESSMENT
83 yo male PMHx of CVA with R sided paralysis, Dementia, CAD, CABG, Systolic HF, HTN, HLD and DM p/w worsening confusion, slurred speech and R shoulder pain x 2 weeks, found in JULIO and Hyperkalemia.    +Hyperkalemia-->s/p calcium gluconate IV and Normal Saline by ED  +JULIO-->S/p IV Fluids hydration, holding diuretic and ACEI    Problem/Plan - 1:  ·  Problem: Hyperkalemia.  Plan: tele monitor  resoled  s/p IV fluids hydration  recheck BMP      Problem/Plan - 2:  ·  Problem: JULIO (acute kidney injury).  Plan: Monitor electrolytes  Trend BUN/Cr  Hold Nephrotoxic meds    Problem/Plan - 3:  ·  Problem: Confusion.  Plan: tele monitor  Aspiration and Falls precautions  HOB elevated 30 degrees   neuro consult  ID consult to ro sepsis    Problem/Plan - 4:  ·  Problem: GERD (Gastroesophageal Reflux Disease).  Plan: started on Protonix 40mg po daily.     Problem/Plan - 5:  ·  Problem: HTN (hypertension)  monitor on current meds

## 2017-05-24 NOTE — PROGRESS NOTE ADULT - SUBJECTIVE AND OBJECTIVE BOX
INTERVAL HPI/OVERNIGHT EVENTS:  pt remains confused. Agitated today  +brown bm overnight    MEDICATIONS  (STANDING):  aspirin enteric coated 81milliGRAM(s) Oral daily  citalopram 10milliGRAM(s) Oral daily  pantoprazole    Tablet 40milliGRAM(s) Oral before breakfast  gabapentin 300milliGRAM(s) Oral daily  risperiDONE   Tablet 0.5milliGRAM(s) Oral <User Schedule>  risperiDONE   Tablet 1milliGRAM(s) Oral at bedtime  simvastatin 20milliGRAM(s) Oral at bedtime  sodium chloride 0.9% lock flush 3milliLiter(s) IV Push every 8 hours  heparin  Injectable 5000Unit(s) SubCutaneous every 8 hours  metoprolol succinate ER 12.5milliGRAM(s) Oral daily  furosemide    Tablet 20milliGRAM(s) Oral daily  nystatin Powder 1Application(s) Topical three times a day  lisinopril 2.5milliGRAM(s) Oral daily    MEDICATIONS  (PRN):  senna 2Tablet(s) Oral at bedtime PRN Constipation  acetaminophen   Tablet. 650milliGRAM(s) Oral every 6 hours PRN mild and moderate pain  haloperidol    Injectable 0.5milliGRAM(s) IV Push every 6 hours PRN Agitation      Allergies    No Known Allergies    Intolerances        Review of Systems:    General:  No wt loss, fevers, chills, night sweats,fatigue,   Eyes:  Good vision, no reported pain  ENT:  No sore throat, pain, runny nose, dysphagia  CV:  No pain, palpitatioins, hypo/hypertension  Resp:  No dyspnea, cough, tachypnea, wheezing  GI:  No pain, No nausea, No vomiting, No diarrhea, No constipatiion, No weight loss, No fever, No pruritis, No rectal bleeding, No tarry stools, No dysphagia,  :  No pain, bleeding, incontinence, nocturia  Muscle:  No pain, weakness  Neuro:  No weakness, tingling, memory problems  Psych:  No fatigue, insomnia, mood problems, depression  Endocrine:  No polyuria, polydypsia, cold/heat intolerance  Heme:  No petechiae, ecchymosis, easy bruisability  Skin:  No rash, tattoos, scars, edema      Vital Signs Last 24 Hrs  T(C): 36.7, Max: 37 (05-23 @ 17:25)  T(F): 98, Max: 98.6 (05-23 @ 17:25)  HR: 72 (68 - 98)  BP: 127/55 (127/55 - 175/53)  BP(mean): --  RR: 18 (18 - 18)  SpO2: 98% (97% - 100%)    PHYSICAL EXAM:    Constitutional: NAD, well-developed  HEENT: EOMI, throat clear  Neck: No LAD, supple  Respiratory: CTA and P  Cardiovascular: S1 and S2, RRR, no M  Gastrointestinal: BS+, soft, NT/ND, neg HSM,  Extremities: No peripheral edema, neg clubing, cyanosis  Vascular: 2+ peripheral pulses  Neurological: A/O x 1  Psychiatric: Normal mood, normal affect  Skin: No rashes      LABS:                        12.3   8.36  )-----------( 148      ( 24 May 2017 06:30 )             36.9     05-24    146<H>  |  105  |  29<H>  ----------------------------<  183<H>  4.1   |  23  |  0.97    Ca    9.9      24 May 2017 06:30  Mg     1.8     05-24            RADIOLOGY & ADDITIONAL TESTS:

## 2017-05-24 NOTE — PROGRESS NOTE ADULT - ASSESSMENT
83 yo male PMHx of CVA with R sided paralysis, Dementia, CAD, CABG, Systolic HF, HTN, HLD and DM p/w worsening confusion, slurred speech and R shoulder pain x 2 weeks, found in JULIO and Hyperkalemia.    +Hyperkalemia-->s/p calcium gluconate IV and Normal Saline by ED  +JULIO-->S/p IV Fluids hydration, holding diuretic and ACEI    Recommend:    1.  Evaluate for underlying infectious issues.  No fever or WBC to suggest septic process. No focal findings on clinical exam  2.  Blood cultures, Ucx, UA and chest xray and RVP negative thus far.  3.  Hold off on antibiotics at this time.  4.  Continue to monitor wbc and temp curve.  5.  Stable from ID standpoint.

## 2017-05-24 NOTE — PROGRESS NOTE ADULT - SUBJECTIVE AND OBJECTIVE BOX
Infectious Diseases progress note:    Subjective:  NAD, afebrile, no bedside cough, sob, no abd pain or diarrhea    ROS:  CONSTITUTIONAL:  Negative fever or chills, feels well  EYES:  Negative  blurry vision or double vision  CARDIOVASCULAR:  Negative for chest pain or palpitations  RESPIRATORY:  Negative for cough, wheezing, or SOB   GASTROINTESTINAL:  Negative for nausea, vomiting, diarrhea, constipation, or abdominal pain  EXTREMITIES:  No cyanosis/clubbing/edema  GENITOURINARY:  Negative frequency, urgency or dysuria  NEUROLOGIC:  No headache, confusion, dizziness, lightheadedness    Allergies    No Known Allergies    Intolerances        ANTIBIOTICS/RELEVANT:  antimicrobials    immunologic:    OTHER:  aspirin enteric coated 81milliGRAM(s) Oral daily  citalopram 10milliGRAM(s) Oral daily  pantoprazole    Tablet 40milliGRAM(s) Oral before breakfast  gabapentin 300milliGRAM(s) Oral daily  risperiDONE   Tablet 0.5milliGRAM(s) Oral <User Schedule>  risperiDONE   Tablet 1milliGRAM(s) Oral at bedtime  senna 2Tablet(s) Oral at bedtime PRN  simvastatin 20milliGRAM(s) Oral at bedtime  heparin  Injectable 5000Unit(s) SubCutaneous every 8 hours  acetaminophen   Tablet. 650milliGRAM(s) Oral every 6 hours PRN  haloperidol    Injectable 0.5milliGRAM(s) IV Push every 6 hours PRN  metoprolol succinate ER 12.5milliGRAM(s) Oral daily  furosemide    Tablet 20milliGRAM(s) Oral daily  nystatin Powder 1Application(s) Topical three times a day  lisinopril 2.5milliGRAM(s) Oral daily      Objective:  Vital Signs Last 24 Hrs  T(C): 36.7, Max: 37 (05-23 @ 17:25)  T(F): 98, Max: 98.6 (05-23 @ 17:25)  HR: 72 (68 - 98)  BP: 127/55 (127/55 - 175/53)  BP(mean): --  RR: 18 (18 - 18)      SpO2: 98% (97% - 100%)    PHYSICAL EXAM:  Constitutional:NAD  Eyes:KRISSY, EOMI  Ear/Nose/Throat: no oral lesion, no sinus tenderness on percussion	  Neck:no JVD, no lymphadenopathy, supple  Respiratory: CTA ginger  Cardiovascular: S1S2 RRR, no murmurs  Gastrointestinal:soft, (+) BS, no HSM  Extremities:no e/e/c        LABS:                        12.3   8.36  )-----------( 148      ( 24 May 2017 06:30 )             36.9     05-24    146<H>  |  105  |  29<H>  ----------------------------<  183<H>  4.1   |  23  |  0.97    Ca    9.9      24 May 2017 06:30  Mg     1.8     05-24      MICROBIOLOGY:    Culture - Blood in AM (05.23.17 @ 07:42)    Culture - Blood:   NO ORGANISMS ISOLATED  NO ORGANISMS ISOLATED AT 24 HOURS    Specimen Source: BLOOD PERIPHERAL    Urinalysis (05.21.17 @ 00:25)    Color: PLYEL    Urine Appearance: CLEAR    Glucose: NEGATIVE    Bilirubin: NEGATIVE    Ketone - Urine: NEGATIVE    Specific Gravity: 1.023    Blood: NEGATIVE    pH - Urine: 6.0    Protein, Urine: 30    Urobilinogen: 2 E.U.    Nitrite: NEGATIVE    Leukocyte Esterase Concentration: NEGATIVE    Red Blood Cell - Urine: 0-2    White Blood Cell - Urine: 0-2    Hyaline Casts: 10-25    Mucus: FEW    Squamous Epithelial: OCC      Influenza B (RapRVP): NOT DETECTED (05-23 @ 13:30)      RADIOLOGY & ADDITIONAL STUDIES:    EXAM:  RAD CHEST PORTABLE IMMEDIATE        PROCEDURE DATE:  May 23 2017         INTERPRETATION:  CLINICAL INDICATION: Rule out infection.    TECHNIQUE: Frontal view of the chest dated 5/23/2017    COMPARISON: X-Ray of the chest dated 9/7/2016    FINDINGS:  Study limited secondary to patient rotation. Status post sternotomy.   Surgical clips overlying the left hemithorax. Lungs are clear. No pleural   effusion or pneumothorax noted. Cardiac silhouette cannot be evaluated on   this AP projection.    IMPRESSION:  Clear lungs. Infectious Diseases progress note:    Subjective:  NAD, afebrile, no bedside cough, sob, no abd pain or diarrhea    ROS:  CONSTITUTIONAL:  Negative fever or chills, feels well  EYES:  Negative  blurry vision or double vision  CARDIOVASCULAR:  Negative for chest pain or palpitations  RESPIRATORY:  Negative for cough, wheezing, or SOB   GASTROINTESTINAL:  Negative for nausea, vomiting, diarrhea, constipation, or abdominal pain  EXTREMITIES:  No cyanosis/clubbing/edema  GENITOURINARY:  Negative frequency, urgency or dysuria  NEUROLOGIC:  No headache, confusion, dizziness, lightheadedness    Allergies    No Known Allergies    Intolerances        ANTIBIOTICS/RELEVANT:  antimicrobials    immunologic:    OTHER:  aspirin enteric coated 81milliGRAM(s) Oral daily  citalopram 10milliGRAM(s) Oral daily  pantoprazole    Tablet 40milliGRAM(s) Oral before breakfast  gabapentin 300milliGRAM(s) Oral daily  risperiDONE   Tablet 0.5milliGRAM(s) Oral <User Schedule>  risperiDONE   Tablet 1milliGRAM(s) Oral at bedtime  senna 2Tablet(s) Oral at bedtime PRN  simvastatin 20milliGRAM(s) Oral at bedtime  heparin  Injectable 5000Unit(s) SubCutaneous every 8 hours  acetaminophen   Tablet. 650milliGRAM(s) Oral every 6 hours PRN  haloperidol    Injectable 0.5milliGRAM(s) IV Push every 6 hours PRN  metoprolol succinate ER 12.5milliGRAM(s) Oral daily  furosemide    Tablet 20milliGRAM(s) Oral daily  nystatin Powder 1Application(s) Topical three times a day  lisinopril 2.5milliGRAM(s) Oral daily      Objective:  Vital Signs Last 24 Hrs  T(C): 36.7, Max: 37 (05-23 @ 17:25)  T(F): 98, Max: 98.6 (05-23 @ 17:25)  HR: 72 (68 - 98)  BP: 127/55 (127/55 - 175/53)  BP(mean): --  RR: 18 (18 - 18)      SpO2: 98% (97% - 100%)    PHYSICAL EXAM:  Constitutional:NAD  Eyes:KRISSY, EOMI  Ear/Nose/Throat: no oral lesion, no sinus tenderness on percussion	  Neck:no JVD, no lymphadenopathy, supple  Respiratory: CTA ginger  Cardiovascular: S1S2 RRR, no murmurs  Gastrointestinal:soft, (+) BS, no HSM  Extremities:no e/e/c.  Rt AKA stump site intact        LABS:                        12.3   8.36  )-----------( 148      ( 24 May 2017 06:30 )             36.9     05-24    146<H>  |  105  |  29<H>  ----------------------------<  183<H>  4.1   |  23  |  0.97    Ca    9.9      24 May 2017 06:30  Mg     1.8     05-24      MICROBIOLOGY:    Culture - Blood in AM (05.23.17 @ 07:42)    Culture - Blood:   NO ORGANISMS ISOLATED  NO ORGANISMS ISOLATED AT 24 HOURS    Specimen Source: BLOOD PERIPHERAL    Urinalysis (05.21.17 @ 00:25)    Color: PLYEL    Urine Appearance: CLEAR    Glucose: NEGATIVE    Bilirubin: NEGATIVE    Ketone - Urine: NEGATIVE    Specific Gravity: 1.023    Blood: NEGATIVE    pH - Urine: 6.0    Protein, Urine: 30    Urobilinogen: 2 E.U.    Nitrite: NEGATIVE    Leukocyte Esterase Concentration: NEGATIVE    Red Blood Cell - Urine: 0-2    White Blood Cell - Urine: 0-2    Hyaline Casts: 10-25    Mucus: FEW    Squamous Epithelial: OCC      Influenza B (RapRVP): NOT DETECTED (05-23 @ 13:30)      RADIOLOGY & ADDITIONAL STUDIES:    EXAM:  RAD CHEST PORTABLE IMMEDIATE        PROCEDURE DATE:  May 23 2017         INTERPRETATION:  CLINICAL INDICATION: Rule out infection.    TECHNIQUE: Frontal view of the chest dated 5/23/2017    COMPARISON: X-Ray of the chest dated 9/7/2016    FINDINGS:  Study limited secondary to patient rotation. Status post sternotomy.   Surgical clips overlying the left hemithorax. Lungs are clear. No pleural   effusion or pneumothorax noted. Cardiac silhouette cannot be evaluated on   this AP projection.    IMPRESSION:  Clear lungs.

## 2017-05-25 ENCOUNTER — TRANSCRIPTION ENCOUNTER (OUTPATIENT)
Age: 82
End: 2017-05-25

## 2017-05-25 LAB
BACTERIA UR CULT: SIGNIFICANT CHANGE UP
BUN SERPL-MCNC: 33 MG/DL — HIGH (ref 7–23)
BUN SERPL-MCNC: 35 MG/DL — HIGH (ref 7–23)
CALCIUM SERPL-MCNC: 10 MG/DL — SIGNIFICANT CHANGE UP (ref 8.4–10.5)
CALCIUM SERPL-MCNC: 9.7 MG/DL — SIGNIFICANT CHANGE UP (ref 8.4–10.5)
CHLORIDE SERPL-SCNC: 103 MMOL/L — SIGNIFICANT CHANGE UP (ref 98–107)
CHLORIDE SERPL-SCNC: 107 MMOL/L — SIGNIFICANT CHANGE UP (ref 98–107)
CO2 SERPL-SCNC: 22 MMOL/L — SIGNIFICANT CHANGE UP (ref 22–31)
CO2 SERPL-SCNC: 25 MMOL/L — SIGNIFICANT CHANGE UP (ref 22–31)
CREAT SERPL-MCNC: 1.05 MG/DL — SIGNIFICANT CHANGE UP (ref 0.5–1.3)
CREAT SERPL-MCNC: 1.13 MG/DL — SIGNIFICANT CHANGE UP (ref 0.5–1.3)
GLUCOSE SERPL-MCNC: 199 MG/DL — HIGH (ref 70–99)
GLUCOSE SERPL-MCNC: 332 MG/DL — HIGH (ref 70–99)
HCT VFR BLD CALC: 35.7 % — LOW (ref 39–50)
HGB BLD-MCNC: 11.7 G/DL — LOW (ref 13–17)
MAGNESIUM SERPL-MCNC: 1.8 MG/DL — SIGNIFICANT CHANGE UP (ref 1.6–2.6)
MCHC RBC-ENTMCNC: 31 PG — SIGNIFICANT CHANGE UP (ref 27–34)
MCHC RBC-ENTMCNC: 32.8 % — SIGNIFICANT CHANGE UP (ref 32–36)
MCV RBC AUTO: 94.7 FL — SIGNIFICANT CHANGE UP (ref 80–100)
PLATELET # BLD AUTO: 165 K/UL — SIGNIFICANT CHANGE UP (ref 150–400)
PMV BLD: 12.2 FL — SIGNIFICANT CHANGE UP (ref 7–13)
POTASSIUM SERPL-MCNC: 4.2 MMOL/L — SIGNIFICANT CHANGE UP (ref 3.5–5.3)
POTASSIUM SERPL-MCNC: 4.2 MMOL/L — SIGNIFICANT CHANGE UP (ref 3.5–5.3)
POTASSIUM SERPL-SCNC: 4.2 MMOL/L — SIGNIFICANT CHANGE UP (ref 3.5–5.3)
POTASSIUM SERPL-SCNC: 4.2 MMOL/L — SIGNIFICANT CHANGE UP (ref 3.5–5.3)
RBC # BLD: 3.77 M/UL — LOW (ref 4.2–5.8)
RBC # FLD: 14.9 % — HIGH (ref 10.3–14.5)
SODIUM SERPL-SCNC: 143 MMOL/L — SIGNIFICANT CHANGE UP (ref 135–145)
SODIUM SERPL-SCNC: 148 MMOL/L — HIGH (ref 135–145)
SPECIMEN SOURCE: SIGNIFICANT CHANGE UP
WBC # BLD: 7.96 K/UL — SIGNIFICANT CHANGE UP (ref 3.8–10.5)
WBC # FLD AUTO: 7.96 K/UL — SIGNIFICANT CHANGE UP (ref 3.8–10.5)

## 2017-05-25 PROCEDURE — 93010 ELECTROCARDIOGRAM REPORT: CPT

## 2017-05-25 PROCEDURE — 73030 X-RAY EXAM OF SHOULDER: CPT | Mod: 26,LT

## 2017-05-25 PROCEDURE — 70450 CT HEAD/BRAIN W/O DYE: CPT | Mod: 26

## 2017-05-25 RX ORDER — FUROSEMIDE 40 MG
1 TABLET ORAL
Qty: 0 | Refills: 0 | COMMUNITY
Start: 2017-05-25

## 2017-05-25 RX ORDER — MAGNESIUM SULFATE 500 MG/ML
1 VIAL (ML) INJECTION ONCE
Qty: 0 | Refills: 0 | Status: COMPLETED | OUTPATIENT
Start: 2017-05-25 | End: 2017-05-25

## 2017-05-25 RX ORDER — POTASSIUM CHLORIDE 20 MEQ
1 PACKET (EA) ORAL
Qty: 0 | Refills: 0 | COMMUNITY

## 2017-05-25 RX ORDER — METFORMIN HYDROCHLORIDE 850 MG/1
1 TABLET ORAL
Qty: 0 | Refills: 0 | COMMUNITY

## 2017-05-25 RX ORDER — PANTOPRAZOLE SODIUM 20 MG/1
1 TABLET, DELAYED RELEASE ORAL
Qty: 0 | Refills: 0 | COMMUNITY
Start: 2017-05-25

## 2017-05-25 RX ORDER — LIDOCAINE 4 G/100G
2 CREAM TOPICAL DAILY
Qty: 0 | Refills: 0 | Status: DISCONTINUED | OUTPATIENT
Start: 2017-05-25 | End: 2017-05-31

## 2017-05-25 RX ORDER — NYSTATIN CREAM 100000 [USP'U]/G
1 CREAM TOPICAL
Qty: 0 | Refills: 0 | COMMUNITY
Start: 2017-05-25

## 2017-05-25 RX ORDER — GABAPENTIN 400 MG/1
1 CAPSULE ORAL
Qty: 0 | Refills: 0 | COMMUNITY
Start: 2017-05-25

## 2017-05-25 RX ORDER — FUROSEMIDE 40 MG
1 TABLET ORAL
Qty: 0 | Refills: 0 | COMMUNITY

## 2017-05-25 RX ORDER — LIDOCAINE AND PRILOCAINE CREAM 25; 25 MG/G; MG/G
1 CREAM TOPICAL
Qty: 0 | Refills: 0 | COMMUNITY
Start: 2017-05-25

## 2017-05-25 RX ORDER — FUROSEMIDE 40 MG
20 TABLET ORAL
Qty: 0 | Refills: 0 | Status: DISCONTINUED | OUTPATIENT
Start: 2017-05-25 | End: 2017-05-31

## 2017-05-25 RX ORDER — SODIUM CHLORIDE 9 MG/ML
1000 INJECTION, SOLUTION INTRAVENOUS
Qty: 0 | Refills: 0 | Status: DISCONTINUED | OUTPATIENT
Start: 2017-05-25 | End: 2017-05-26

## 2017-05-25 RX ADMIN — Medication 650 MILLIGRAM(S): at 18:46

## 2017-05-25 RX ADMIN — Medication 12.5 MILLIGRAM(S): at 05:50

## 2017-05-25 RX ADMIN — NYSTATIN CREAM 1 APPLICATION(S): 100000 CREAM TOPICAL at 21:37

## 2017-05-25 RX ADMIN — SODIUM CHLORIDE 3 MILLILITER(S): 9 INJECTION INTRAMUSCULAR; INTRAVENOUS; SUBCUTANEOUS at 13:50

## 2017-05-25 RX ADMIN — NYSTATIN CREAM 1 APPLICATION(S): 100000 CREAM TOPICAL at 05:49

## 2017-05-25 RX ADMIN — HEPARIN SODIUM 5000 UNIT(S): 5000 INJECTION INTRAVENOUS; SUBCUTANEOUS at 05:49

## 2017-05-25 RX ADMIN — Medication 20 MILLIGRAM(S): at 05:49

## 2017-05-25 RX ADMIN — Medication 650 MILLIGRAM(S): at 05:58

## 2017-05-25 RX ADMIN — SODIUM CHLORIDE 50 MILLILITER(S): 9 INJECTION, SOLUTION INTRAVENOUS at 21:36

## 2017-05-25 RX ADMIN — CITALOPRAM 10 MILLIGRAM(S): 10 TABLET, FILM COATED ORAL at 11:33

## 2017-05-25 RX ADMIN — Medication 100 GRAM(S): at 22:51

## 2017-05-25 RX ADMIN — HALOPERIDOL DECANOATE 0.5 MILLIGRAM(S): 100 INJECTION INTRAMUSCULAR at 05:58

## 2017-05-25 RX ADMIN — NYSTATIN CREAM 1 APPLICATION(S): 100000 CREAM TOPICAL at 14:19

## 2017-05-25 RX ADMIN — PANTOPRAZOLE SODIUM 40 MILLIGRAM(S): 20 TABLET, DELAYED RELEASE ORAL at 05:50

## 2017-05-25 RX ADMIN — LISINOPRIL 2.5 MILLIGRAM(S): 2.5 TABLET ORAL at 05:49

## 2017-05-25 RX ADMIN — RISPERIDONE 1 MILLIGRAM(S): 4 TABLET ORAL at 21:37

## 2017-05-25 RX ADMIN — SIMVASTATIN 20 MILLIGRAM(S): 20 TABLET, FILM COATED ORAL at 21:37

## 2017-05-25 RX ADMIN — GABAPENTIN 300 MILLIGRAM(S): 400 CAPSULE ORAL at 11:33

## 2017-05-25 RX ADMIN — Medication 650 MILLIGRAM(S): at 06:28

## 2017-05-25 RX ADMIN — RISPERIDONE 0.5 MILLIGRAM(S): 4 TABLET ORAL at 09:06

## 2017-05-25 RX ADMIN — LIDOCAINE AND PRILOCAINE CREAM 1 APPLICATION(S): 25; 25 CREAM TOPICAL at 09:04

## 2017-05-25 RX ADMIN — SODIUM CHLORIDE 3 MILLILITER(S): 9 INJECTION INTRAMUSCULAR; INTRAVENOUS; SUBCUTANEOUS at 05:50

## 2017-05-25 RX ADMIN — HEPARIN SODIUM 5000 UNIT(S): 5000 INJECTION INTRAVENOUS; SUBCUTANEOUS at 14:19

## 2017-05-25 RX ADMIN — LIDOCAINE 2 PATCH: 4 CREAM TOPICAL at 11:32

## 2017-05-25 RX ADMIN — SODIUM CHLORIDE 50 MILLILITER(S): 9 INJECTION, SOLUTION INTRAVENOUS at 09:03

## 2017-05-25 RX ADMIN — HEPARIN SODIUM 5000 UNIT(S): 5000 INJECTION INTRAVENOUS; SUBCUTANEOUS at 21:37

## 2017-05-25 RX ADMIN — LIDOCAINE 2 PATCH: 4 CREAM TOPICAL at 22:13

## 2017-05-25 RX ADMIN — SODIUM CHLORIDE 3 MILLILITER(S): 9 INJECTION INTRAMUSCULAR; INTRAVENOUS; SUBCUTANEOUS at 21:33

## 2017-05-25 RX ADMIN — HALOPERIDOL DECANOATE 0.5 MILLIGRAM(S): 100 INJECTION INTRAMUSCULAR at 21:36

## 2017-05-25 RX ADMIN — Medication 650 MILLIGRAM(S): at 19:45

## 2017-05-25 RX ADMIN — Medication 81 MILLIGRAM(S): at 11:33

## 2017-05-25 NOTE — PROGRESS NOTE ADULT - SUBJECTIVE AND OBJECTIVE BOX
CC : no acute events noted.       PHYSICAL EXAM:  T(C): 36.9, Max: 36.9 (05-25 @ 13:32)  HR: 86 (61 - 90)  BP: 149/72 (93/27 - 149/72)  RR: 17 (16 - 17)  SpO2: 95% (95% - 99%)  Wt(kg): --  I&O's Summary      Appearance: Normal	  Cardiovascular: Normal S1 S2,RRR, + Sys Murmur  Respiratory: Lungs clear to auscultation	  Gastrointestinal:  Soft, Non-tender, + BS	  Extremities: Normal range of motion, No clubbing, cyanosis or edema        MEDICATIONS  (STANDING):  aspirin enteric coated 81milliGRAM(s) Oral daily  citalopram 10milliGRAM(s) Oral daily  pantoprazole    Tablet 40milliGRAM(s) Oral before breakfast  gabapentin 300milliGRAM(s) Oral daily  risperiDONE   Tablet 0.5milliGRAM(s) Oral <User Schedule>  risperiDONE   Tablet 1milliGRAM(s) Oral at bedtime  simvastatin 20milliGRAM(s) Oral at bedtime  sodium chloride 0.9% lock flush 3milliLiter(s) IV Push every 8 hours  heparin  Injectable 5000Unit(s) SubCutaneous every 8 hours  metoprolol succinate ER 12.5milliGRAM(s) Oral daily  furosemide    Tablet 20milliGRAM(s) Oral daily  nystatin Powder 1Application(s) Topical three times a day  lisinopril 2.5milliGRAM(s) Oral daily  dextrose 5%. 1000milliLiter(s) IV Continuous <Continuous>  lidocaine   Patch 2Patch Transdermal daily      TELEMETRY: SR , second degree HB type 1 	        LABS:	 	                          11.7   7.96  )-----------( 165      ( 25 May 2017 05:13 )             35.7     05-25    148<H>  |  107  |  33<H>  ----------------------------<  199<H>  4.2   |  22  |  1.05    Ca    10.0      25 May 2017 05:13  Mg     1.8     05-24 CC : no acute events noted.       PHYSICAL EXAM:  T(C): 36.9, Max: 36.9 (05-25 @ 13:32)  HR: 86 (61 - 90)  BP: 149/72 (93/27 - 149/72)  RR: 17 (16 - 17)  SpO2: 95% (95% - 99%)  Wt(kg): --  I&O's Summary      Appearance: Normal	  Cardiovascular: Normal S1 S2,RRR, + Sys Murmur  Respiratory: Lungs clear to auscultation	  Gastrointestinal:  Soft, Non-tender, + BS	  Extremities: No  edema        MEDICATIONS  (STANDING):  aspirin enteric coated 81milliGRAM(s) Oral daily  citalopram 10milliGRAM(s) Oral daily  pantoprazole    Tablet 40milliGRAM(s) Oral before breakfast  gabapentin 300milliGRAM(s) Oral daily  risperiDONE   Tablet 0.5milliGRAM(s) Oral <User Schedule>  risperiDONE   Tablet 1milliGRAM(s) Oral at bedtime  simvastatin 20milliGRAM(s) Oral at bedtime  sodium chloride 0.9% lock flush 3milliLiter(s) IV Push every 8 hours  heparin  Injectable 5000Unit(s) SubCutaneous every 8 hours  metoprolol succinate ER 12.5milliGRAM(s) Oral daily  furosemide    Tablet 20milliGRAM(s) Oral daily  nystatin Powder 1Application(s) Topical three times a day  lisinopril 2.5milliGRAM(s) Oral daily  dextrose 5%. 1000milliLiter(s) IV Continuous <Continuous>  lidocaine   Patch 2Patch Transdermal daily      TELEMETRY: SR , second degree HB type 1 	        LABS:	 	                          11.7   7.96  )-----------( 165      ( 25 May 2017 05:13 )             35.7     05-25    148<H>  |  107  |  33<H>  ----------------------------<  199<H>  4.2   |  22  |  1.05    Ca    10.0      25 May 2017 05:13  Mg     1.8     05-24

## 2017-05-25 NOTE — PROGRESS NOTE ADULT - SUBJECTIVE AND OBJECTIVE BOX
Patient is a 82y old  Male who presents with a chief complaint of worsening confusion (25 May 2017 06:58)      HPI:  No events noted, pt difficult historian      Vital Signs Last 24 Hrs  T(C): 36.5, Max: 36.8 (05-25 @ 03:59)  T(F): 97.7, Max: 98.3 (05-25 @ 03:59)  HR: 90 (61 - 90)  BP: 123/47 (93/27 - 148/52)  BP(mean): --  RR: 16 (16 - 17)  SpO2: 99% (95% - 99%)    Physical Exam    Mental Status- awake, confused  CN- 2-12 grossly intact  Motor- R hemiparesis  Sensory- unreliable  Coordination-unreliable  Gait- deferred

## 2017-05-25 NOTE — DISCHARGE NOTE ADULT - COMMUNITY RESOURCES
[facility] for 24/7 aides /SERGIO; [facility]ULANCE to be set up by RNCM pending [facility] confirmation of aide reinstatement [facility] for 24/7 aides /SERGIO; reinstatement of aide care, date and time  per [facility]; AMBULANCE to be set up by RNCM pending [facility] confirmation of aide reinstatement

## 2017-05-25 NOTE — PROGRESS NOTE ADULT - ASSESSMENT
81 yo male with CVA, Dementia, CAD, CABG, CMP, AS, Systolic HF, HTN, HLD and DM admitted  worsening dementia / JULIO /  Hyperkalemia.    1. CV stable    Sys HF/ CMP: chronic,  EF 23 %. unlikely a candidate for AICD given advance dementia  - volume status ok , change lasix  to 20mg PO Q48 hrs , monitor BMP   - continue with bb/ acei   -monitor i/o    3. HLD : continue with statin     4. HTN : bp acceptable , continue with current antihtn meds     5..  DVT PPX     5. neuro/med/psych  f.u

## 2017-05-25 NOTE — DISCHARGE NOTE ADULT - PLAN OF CARE
consider MRI brain or repeat CT head as outpatient- follow up with your neurologist or DR Bolden due to multifocal encephalopathy and severe dementia Follow up with Dr Reyes as outpatient- may need endoscopy   continue protonix, senna, colace Follow up with your cardiologist  NO ICD due to severe dementia  Continue lasix at lower dose for now low salt, low fat, low cholesterol Follow up with DR Edmond-neprhologist  restarted ACEI-low dose and lasix- lower dose  d/c potassium due to hyperkalemia  low salt, low fat, low cholesterol follow up with your cardiologist  need repeat echocardiogram Follow up with your cardiologist for further management  NO ICD due to severe dementia  Continue lasix at lower dose for now low salt, low fat, low cholesterol  continue aspirin, zocor, toprol, lisinopril  follow up with cardiology for possible stress testing follow up with your cardiologist Dr Ervin  need repeat echocardiogram monitor blood pressure. Continue prescribed medications

## 2017-05-25 NOTE — PROGRESS NOTE ADULT - ASSESSMENT
83 yo male PMHx of CVA with R sided paralysis, Dementia, CAD, CABG, Systolic HF, HTN, HLD and DM p/w worsening confusion, slurred speech and R shoulder pain x 2 weeks, found in JULIO and Hyperkalemia.    Problem/Plan - 1:  ·  Problem: Hyperkalemia.  Plan: tele monitor  resoled  s/p IV fluids hydration  recheck BMP      Problem/Plan - 2:  ·  Problem: JULIO (acute kidney injury).  Plan: Monitor electrolytes  Trend BUN/Cr  Hold Nephrotoxic meds    Problem/Plan - 3:  ·  Problem: Confusion.  Plan: tele monitor  Aspiration and Falls precautions  HOB elevated 30 degrees   neuro consult  ID consult to ro sepsis    Problem/Plan - 4:  ·  Problem: GERD (Gastroesophageal Reflux Disease).  Plan: started on Protonix 40mg po daily.     Problem/Plan - 5:  ·  Problem: HTN (hypertension)  monitor on current meds

## 2017-05-25 NOTE — DISCHARGE NOTE ADULT - SECONDARY DIAGNOSIS.
Acute kidney injury Aortic stenosis, moderate Benign hypertension CAD (coronary artery disease) Chronic systolic HF (heart failure) GERD (Gastroesophageal Reflux Disease)

## 2017-05-25 NOTE — DISCHARGE NOTE ADULT - NS AS ACTIVITY OBS
Do not drive or operate machinery/Walking-Indoors allowed/Showering allowed Walking-Indoors allowed/No Heavy lifting/straining/Do not drive or operate machinery/Do not make important decisions/Showering allowed

## 2017-05-25 NOTE — DIETITIAN INITIAL EVALUATION ADULT. - OTHER INFO
Nutrition consult for chewing and swallow difficulties.  Per staff, PO intake good at this time.  Observed 100% meal intake.  No GI distress (nausea/vomiting/diarrhea/constipation.) Per RN, No difficulties with current diet Dysphagia 1, Pureed,  Honey Consistency.  No swallow evaluation noted.

## 2017-05-25 NOTE — PROGRESS NOTE ADULT - SUBJECTIVE AND OBJECTIVE BOX
Infectious Diseases progress note:    Subjective:  Lethargic but arousable.  Offers no complaints    ROS:  CONSTITUTIONAL:  Negative fever or chills, feels well  EYES:  Negative  blurry vision or double vision  CARDIOVASCULAR:  Negative for chest pain or palpitations  RESPIRATORY:  Negative for cough, wheezing, or SOB   GASTROINTESTINAL:  Negative for nausea, vomiting, diarrhea, constipation, or abdominal pain  EXTREMITIES:  No cyanosis/clubbing/edema  GENITOURINARY:  Negative frequency, urgency or dysuria  NEUROLOGIC:  No headache, confusion, dizziness, lightheadedness    Allergies    No Known Allergies    Intolerances        ANTIBIOTICS/RELEVANT:  antimicrobials    immunologic:    OTHER:  aspirin enteric coated 81milliGRAM(s) Oral daily  citalopram 10milliGRAM(s) Oral daily  pantoprazole    Tablet 40milliGRAM(s) Oral before breakfast  gabapentin 300milliGRAM(s) Oral daily  risperiDONE   Tablet 0.5milliGRAM(s) Oral <User Schedule>  risperiDONE   Tablet 1milliGRAM(s) Oral at bedtime  senna 2Tablet(s) Oral at bedtime PRN  simvastatin 20milliGRAM(s) Oral at bedtime  heparin  Injectable 5000Unit(s) SubCutaneous every 8 hours  acetaminophen   Tablet. 650milliGRAM(s) Oral every 6 hours PRN  haloperidol    Injectable 0.5milliGRAM(s) IV Push every 6 hours PRN  metoprolol succinate ER 12.5milliGRAM(s) Oral daily  nystatin Powder 1Application(s) Topical three times a day  lisinopril 2.5milliGRAM(s) Oral daily  lidocaine/prilocaine Cream 1Application(s) Topical every 3 hours PRN  dextrose 5%. 1000milliLiter(s) IV Continuous <Continuous>  lidocaine   Patch 2Patch Transdermal daily  furosemide    Tablet 20milliGRAM(s) Oral every 48 hours      Objective:  Vital Signs Last 24 Hrs  T(C): 36.9, Max: 36.9 (05-25 @ 13:32)  T(F): 98.5, Max: 98.5 (05-25 @ 13:32)  HR: 86 (61 - 90)  BP: 149/72 (93/27 - 149/72)  BP(mean): --  RR: 17 (16 - 17)  SpO2: 95% (95% - 99%)    PHYSICAL EXAM:  Constitutional:NAD  Eyes:KRISSY, EOMI  Ear/Nose/Throat: no oral lesion, no sinus tenderness on percussion	  Neck:no JVD, no lymphadenopathy, supple  Respiratory: CTA ginger  Cardiovascular: S1S2 RRR, no murmurs  Gastrointestinal:soft, (+) BS, no HSM  Extremities:no e/e/c        LABS:                        11.7   7.96  )-----------( 165      ( 25 May 2017 05:13 )             35.7     05-25    148<H>  |  107  |  33<H>  ----------------------------<  199<H>  4.2   |  22  |  1.05    Ca    10.0      25 May 2017 05:13  Mg     1.8     05-24      MICROBIOLOGY:    Culture - Urine (05.23.17 @ 18:35)    Culture - Urine:   GNR^Gram Neg Rods  COLONY COUNT: LESS THAN 10,000 CFU/ML    Specimen Source: URINE MIDSTREAM    Culture - Blood in AM (05.23.17 @ 07:42)    Culture - Blood:   NO ORGANISMS ISOLATED  NO ORGANISMS ISOLATED AT 48 HRS.    Specimen Source: BLOOD PERIPHERAL          RADIOLOGY & ADDITIONAL STUDIES:

## 2017-05-25 NOTE — DISCHARGE NOTE ADULT - PATIENT PORTAL LINK FT
“You can access the [facility] Patient Portal, offered by [facility], by registering with the following website: [email or web address]”

## 2017-05-25 NOTE — DISCHARGE NOTE ADULT - MEDICATION SUMMARY - MEDICATIONS TO STOP TAKING
I will STOP taking the medications listed below when I get home from the hospital:    metFORMIN 500 mg oral tablet  -- 1 tab(s) by mouth 3 times a day    potassium chloride 20 mEq oral granule, extended release  -- 1 tab(s) by mouth once a day

## 2017-05-25 NOTE — PROGRESS NOTE ADULT - SUBJECTIVE AND OBJECTIVE BOX
Patient is a 82y old  Male who presents with a chief complaint of worsening confusion (25 May 2017 06:58)      INTERVAL HPI/OVERNIGHT EVENTS:  T(C): 36.5, Max: 36.8 (05-25 @ 03:59)  HR: 90 (61 - 90)  BP: 123/47 (93/27 - 148/52)  RR: 16 (16 - 17)  SpO2: 99% (95% - 99%)  Wt(kg): --  I&O's Summary      LABS:                        11.7   7.96  )-----------( 165      ( 25 May 2017 05:13 )             35.7     05-25    148<H>  |  107  |  33<H>  ----------------------------<  199<H>  4.2   |  22  |  1.05    Ca    10.0      25 May 2017 05:13  Mg     1.8     05-24          CAPILLARY BLOOD GLUCOSE            MEDICATIONS  (STANDING):  aspirin enteric coated 81milliGRAM(s) Oral daily  citalopram 10milliGRAM(s) Oral daily  pantoprazole    Tablet 40milliGRAM(s) Oral before breakfast  gabapentin 300milliGRAM(s) Oral daily  risperiDONE   Tablet 0.5milliGRAM(s) Oral <User Schedule>  risperiDONE   Tablet 1milliGRAM(s) Oral at bedtime  simvastatin 20milliGRAM(s) Oral at bedtime  sodium chloride 0.9% lock flush 3milliLiter(s) IV Push every 8 hours  heparin  Injectable 5000Unit(s) SubCutaneous every 8 hours  metoprolol succinate ER 12.5milliGRAM(s) Oral daily  furosemide    Tablet 20milliGRAM(s) Oral daily  nystatin Powder 1Application(s) Topical three times a day  lisinopril 2.5milliGRAM(s) Oral daily  dextrose 5%. 1000milliLiter(s) IV Continuous <Continuous>    MEDICATIONS  (PRN):  senna 2Tablet(s) Oral at bedtime PRN Constipation  acetaminophen   Tablet. 650milliGRAM(s) Oral every 6 hours PRN mild and moderate pain  haloperidol    Injectable 0.5milliGRAM(s) IV Push every 6 hours PRN Agitation  lidocaine/prilocaine Cream 1Application(s) Topical every 3 hours PRN Topical moderate to severe pain      REVIEW OF SYSTEMS:  CONSTITUTIONAL: No fever, weight loss, or fatigue  EYES: No eye pain, visual disturbances, or discharge  ENMT:  No difficulty hearing, tinnitus, vertigo; No sinus or throat pain  NECK: No pain or stiffness  RESPIRATORY: No cough, wheezing, chills or hemoptysis; No shortness of breath  CARDIOVASCULAR: No chest pain, palpitations, dizziness, or leg swelling  GASTROINTESTINAL: No abdominal or epigastric pain. No nausea, vomiting, or hematemesis; No diarrhea or constipation. No melena or hematochezia.  GENITOURINARY: No dysuria, frequency, hematuria, or incontinence  NEUROLOGICAL: No headaches, memory loss, loss of strength, numbness, or tremors  SKIN: No itching, burning, rashes, or lesions   LYMPH NODES: No enlarged glands  ENDOCRINE: No heat or cold intolerance; No hair loss  MUSCULOSKELETAL: No joint pain or swelling; No muscle, back, or extremity pain  PSYCHIATRIC: No depression, anxiety, mood swings, or difficulty sleeping  HEME/LYMPH: No easy bruising, or bleeding gums  ALLERY AND IMMUNOLOGIC: No hives or eczema    RADIOLOGY & ADDITIONAL TESTS:    Imaging Personally Reviewed:  [ ] YES  [ ] NO    Consultant(s) Notes Reviewed:  [ ] YES  [ ] NO    PHYSICAL EXAM:  GENERAL: NAD, well-groomed, well-developed  HEAD:  Atraumatic, Normocephalic  EYES: EOMI, PERRLA, conjunctiva and sclera clear  ENMT: No tonsillar erythema, exudates, or enlargement; Moist mucous membranes, Good dentition, No lesions  NECK: Supple, No JVD, Normal thyroid  NERVOUS SYSTEM:  Alert & Oriented X3, Good concentration; Motor Strength 5/5 B/L upper and lower extremities; DTRs 2+ intact and symmetric  CHEST/LUNG: Clear to percussion bilaterally; No rales, rhonchi, wheezing, or rubs  HEART: Regular rate and rhythm; No murmurs, rubs, or gallops  ABDOMEN: Soft, Nontender, Nondistended; Bowel sounds present  EXTREMITIES:  2+ Peripheral Pulses, No clubbing, cyanosis, or edema  LYMPH: No lymphadenopathy noted  SKIN: No rashes or lesions    Care Discussed with Consultants/Other Providers [*] YES  [ ] NO

## 2017-05-25 NOTE — PROGRESS NOTE ADULT - SUBJECTIVE AND OBJECTIVE BOX
INTERVAL HPI/OVERNIGHT EVENTS:  pt confused, no new gi events/ no gib    MEDICATIONS  (STANDING):  aspirin enteric coated 81milliGRAM(s) Oral daily  citalopram 10milliGRAM(s) Oral daily  pantoprazole    Tablet 40milliGRAM(s) Oral before breakfast  gabapentin 300milliGRAM(s) Oral daily  risperiDONE   Tablet 0.5milliGRAM(s) Oral <User Schedule>  risperiDONE   Tablet 1milliGRAM(s) Oral at bedtime  simvastatin 20milliGRAM(s) Oral at bedtime  sodium chloride 0.9% lock flush 3milliLiter(s) IV Push every 8 hours  heparin  Injectable 5000Unit(s) SubCutaneous every 8 hours  metoprolol succinate ER 12.5milliGRAM(s) Oral daily  furosemide    Tablet 20milliGRAM(s) Oral daily  nystatin Powder 1Application(s) Topical three times a day  lisinopril 2.5milliGRAM(s) Oral daily  dextrose 5%. 1000milliLiter(s) IV Continuous <Continuous>  lidocaine   Patch 2Patch Transdermal daily    MEDICATIONS  (PRN):  senna 2Tablet(s) Oral at bedtime PRN Constipation  acetaminophen   Tablet. 650milliGRAM(s) Oral every 6 hours PRN mild and moderate pain  haloperidol    Injectable 0.5milliGRAM(s) IV Push every 6 hours PRN Agitation  lidocaine/prilocaine Cream 1Application(s) Topical every 3 hours PRN Topical moderate to severe pain      Allergies    No Known Allergies    Intolerances        Review of Systems: **pt unreliable ROS as confused state    General:  No wt loss, fevers, chills, night sweats,fatigue,   Eyes:  Good vision, no reported pain  ENT:  No sore throat, pain, runny nose, dysphagia  CV:  No pain, palpitatioins, hypo/hypertension  Resp:  No dyspnea, cough, tachypnea, wheezing  GI:  No pain, No nausea, No vomiting, No diarrhea, No constipatiion, No weight loss, No fever, No pruritis, No rectal bleeding, No tarry stools, No dysphagia,  :  No pain, bleeding, incontinence, nocturia  Muscle:  No pain, weakness  Neuro:  No weakness, tingling, memory problems  Psych:  No fatigue, insomnia, mood problems, depression  Endocrine:  No polyuria, polydypsia, cold/heat intolerance  Heme:  No petechiae, ecchymosis, easy bruisability  Skin:  No rash, tattoos, scars, edema      Vital Signs Last 24 Hrs  T(C): 36.5, Max: 36.8 (05-25 @ 03:59)  T(F): 97.7, Max: 98.3 (05-25 @ 03:59)  HR: 90 (61 - 90)  BP: 123/47 (93/27 - 148/52)  BP(mean): --  RR: 16 (16 - 17)  SpO2: 99% (95% - 99%)    PHYSICAL EXAM:    Constitutional: NAD, well-developed  HEENT: EOMI, throat clear  Neck: No LAD, supple  Respiratory: CTA and P  Cardiovascular: S1 and S2, RRR, no M  Gastrointestinal: BS+, soft, NT/ND, neg HSM,  Extremities: No peripheral edema, neg clubing, cyanosis  Vascular: 2+ peripheral pulses  Neurological: A/O x 1-2, no focal deficits  Psychiatric: Normal mood, normal affect  Skin: No rashes      LABS:                        11.7   7.96  )-----------( 165      ( 25 May 2017 05:13 )             35.7     05-25    148<H>  |  107  |  33<H>  ----------------------------<  199<H>  4.2   |  22  |  1.05    Ca    10.0      25 May 2017 05:13  Mg     1.8     05-24            RADIOLOGY & ADDITIONAL TESTS:

## 2017-05-25 NOTE — DISCHARGE NOTE ADULT - CARE PLAN
Principal Discharge DX:	Confusion  Goal:	due to multifocal encephalopathy and severe dementia  Instructions for follow-up, activity and diet:	consider MRI brain or repeat CT head as outpatient- follow up with your neurologist or DR Bolden  Secondary Diagnosis:	Acute kidney injury  Instructions for follow-up, activity and diet:	Follow up with DR Edmond-neprhologist  restarted ACEI-low dose and lasix- lower dose  d/c potassium due to hyperkalemia  low salt, low fat, low cholesterol  Secondary Diagnosis:	Aortic stenosis, moderate  Instructions for follow-up, activity and diet:	follow up with your cardiologist  need repeat echocardiogram  Secondary Diagnosis:	Benign hypertension  Instructions for follow-up, activity and diet:	low salt, low fat, low cholesterol  Secondary Diagnosis:	CAD (coronary artery disease)  Instructions for follow-up, activity and diet:	low salt, low fat, low cholesterol  Secondary Diagnosis:	Chronic systolic HF (heart failure)  Instructions for follow-up, activity and diet:	Follow up with your cardiologist  NO ICD due to severe dementia  Continue lasix at lower dose for now  Secondary Diagnosis:	GERD (Gastroesophageal Reflux Disease)  Instructions for follow-up, activity and diet:	Follow up with Dr Reyes as outpatient- may need endoscopy   continue protonix, senna, colace Principal Discharge DX:	Confusion  Goal:	due to multifocal encephalopathy and severe dementia  Instructions for follow-up, activity and diet:	consider MRI brain or repeat CT head as outpatient- follow up with your neurologist or DR Bolden  Secondary Diagnosis:	Acute kidney injury  Instructions for follow-up, activity and diet:	Follow up with DR Edmond-neprhologist  restarted ACEI-low dose and lasix- lower dose  d/c potassium due to hyperkalemia  low salt, low fat, low cholesterol  Secondary Diagnosis:	Aortic stenosis, moderate  Instructions for follow-up, activity and diet:	follow up with your cardiologist Dr Ervin  need repeat echocardiogram  Secondary Diagnosis:	Benign hypertension  Instructions for follow-up, activity and diet:	low salt, low fat, low cholesterol  Secondary Diagnosis:	CAD (coronary artery disease)  Instructions for follow-up, activity and diet:	low salt, low fat, low cholesterol  continue aspirin, zocor, toprol, lisinopril  follow up with cardiology for possible stress testing  Secondary Diagnosis:	Chronic systolic HF (heart failure)  Instructions for follow-up, activity and diet:	Follow up with your cardiologist for further management  NO ICD due to severe dementia  Continue lasix at lower dose for now  Secondary Diagnosis:	GERD (Gastroesophageal Reflux Disease)  Instructions for follow-up, activity and diet:	Follow up with Dr Reyes as outpatient- may need endoscopy   continue protonix, senna, colace Principal Discharge DX:	Confusion  Goal:	due to multifocal encephalopathy and severe dementia  Instructions for follow-up, activity and diet:	consider MRI brain or repeat CT head as outpatient- follow up with your neurologist or DR Bolden  Secondary Diagnosis:	Acute kidney injury  Instructions for follow-up, activity and diet:	Follow up with DR Edmond-neprhologist  restarted ACEI-low dose and lasix- lower dose  d/c potassium due to hyperkalemia  low salt, low fat, low cholesterol  Secondary Diagnosis:	Aortic stenosis, moderate  Instructions for follow-up, activity and diet:	follow up with your cardiologist Dr Ervin  need repeat echocardiogram  Secondary Diagnosis:	Benign hypertension  Goal:	monitor blood pressure. Continue prescribed medications  Instructions for follow-up, activity and diet:	low salt, low fat, low cholesterol  Secondary Diagnosis:	CAD (coronary artery disease)  Instructions for follow-up, activity and diet:	low salt, low fat, low cholesterol  continue aspirin, zocor, toprol, lisinopril  follow up with cardiology for possible stress testing  Secondary Diagnosis:	Chronic systolic HF (heart failure)  Instructions for follow-up, activity and diet:	Follow up with your cardiologist for further management  NO ICD due to severe dementia  Continue lasix at lower dose for now  Secondary Diagnosis:	GERD (Gastroesophageal Reflux Disease)  Instructions for follow-up, activity and diet:	Follow up with Dr Reyes as outpatient- may need endoscopy   continue protonix, senna, colace

## 2017-05-25 NOTE — DISCHARGE NOTE ADULT - HOSPITAL COURSE
83 y/o male with a PMHx of CAD S/P CABG, ischemic cardiomyopathy with severe LV dysfunction, CVA with residual right sided paralysis, HTN, HLD, DM, PAD S/P right AKA, and dementia presents to ED with worsening confusion, slurred speech and right shoulder pain, found to be hyperkalemic and JULIO. Renal was consulted and IVF given with ACEI held. Probably hemodynamically mediated. Creatinine now improved. Patient was also hyperkalemic and given calcium gluconate in ED- now resolved.  Monitor renal function. Avoid nephrotoxins;Bilateral edema of lower extremity: Lasix restarted as per cardiology on 5/23/17. Cardiology was consulted for severe cardioymyopathy but patient cannot get ICD given advanced dementia. ACE and Lasix held. BB also held for mobitz type I but then resumed. Neurology also following for confusion and EEG showed indicate moderate multifocal cerebral dysfunction. There were no epileptiform abnormalities recorded. Psych also  consulted and recommended Continue with Risperidol, Celexa, and Neurotin. Haldol PRN. No capacity to leave AMA. Mental status change likely due to multifocal encephalopathy. ID consult done - workup neg thus far. Cont to monitor wbc and temp curve. ok from ID perspective to go home without antibiotics.  GI consulted (Dr. Reyes) and recommended GERD precautions. Monitor ins and outs. PPI daily. May need EGD as outpatient. Advance diet as tolerated.       consults  5/21 Neuro consult (Dr. Cruz): EEG. R/O underlying infection. Consider ortho eval for RUE pain. If no improvement in mental status, consider MRI brain or repeat CT head.  5/21 Cards consult (Dr. Olson): Pt with severe CM and chronic systolic CHF presenting with worsening mental status and dehydration. CV status appears stable. Pt appears euvolemic at this time. Hold Lasix and resume BB if HR allows. R/O infection.   5/21 GI consult (Dr. Reyes): GERD precautions. Monitor ins and outs. PPI daily. May need EGD. Advance diet as tolerated. Hematology follow up. Check iron studies. Check CBC and transfuse as needed.  5/21 Renal consult (Dr. Edmond): JULIO improving with IVF. Will give another small amount of IVF now as pt still appears dehydrated. D/C lasix, ACE, KCl.  5/22 Neuro note: EEG. Psych eval. Rule out underlying infection.   5/22 Renal note: JULIO likely hemodynamically mediated secondary to diuretic ACE-I therapy given hyaline casts on UA with resolution on IVF. Monitor renal function. Avoid nephrotoxins. Keep off ACE for now. Change diet to low sodium. Monitor BP. Continue to hold Lasix for now.  5/22 Cards note: CV stable. JULIO resolved. Severe ICM. Medical management. Restart BB. ACE on hold secondary to hyperkalemia. Defer ICD for now in light of advanced dementia.  5/22 Medicine note: Monitor on tele for hyperkalemia. Avoid nephrotoxin. Neuro appreciated. HOB elevated for aspiration risk. ID consult to rule out sepsis.   5/22 GI note: No overt GI bleed. Aspiration precautions. Senna/Colace.   5/22 Psych consult: Continue with Risperidol, Celexa, and Neurotin. Haldol PRN. No capacity to leave AMA.   5/22 ID consult (Dr. Gonzalez): Evaluate for underlying infectious issues. Follow up urine and blood cultures. CXR. Check RVP. Hold off on antibiotics at this time. Continue to monitor WBC and temp curve.  5/23 Medicine note: Hyperkalemia. Tele. S/P IVF. BMP daily. Hold KCl and ACE. Trend renal function. Aspiration and fall precautions.  5/23 Renal note: JULIO likely hemodynamically mediated secondary to diuretic ACE therapy given hyaline casts on UA with resolution on IVF. Monitor renal function. Avoid nephrotoxins. BP improved. Continue with current medications and low sodium diet. Can titrate metoprolol as needed. Monitor BP. Patient remains off of ACE-I. Bilateral edema of lower extremity. Patient does not appear volume overloaded at this time. Continue to hold Lasix for now. Monitor UO. Hyperkalemia secondary to JULIO, ACEI and potassium supplementation now resolved. Monitor K.   5/23 Neuro note: AMS likely multifactorial encephalopathy. EEG negative for seizures. Psych eval. Rule out underlying infection.  5/23 GI note: GERD without esophagitis. PPI daily. Continue Senna, Colace. No overt GIB. Aspiration precautions. Dysphagia diet.  5/23 Cards note: CV stable. Resume low dose oral Lasix. BB. Resume low dose ACE. No ICD in light of advanced dementia.   5/24 Renal: Acute kidney injury likely hemodynamically mediated secondary to diuretic ACE-I therapy given hyaline casts on UA with resolution on IVF. Monitor renal function. Avoid nephrotoxins; Benign hypertension: BP labile as increases when patient agitated. Can restart low dose ACE-I. Can titrate metoprolol as needed as per cardiology. Monitor BP; Bilateral edema of lower extremity: Lasix restarted as per cardiology on 5/23/17. Patient does not appear volume overloaded at this time. Monitor UO; Hyperkalemia secondary to JULIO, ACE-I and potassium supplementation, now resolved. Monitor K; Hypernatremia: Mild and secondary to free water deficit. Patient advised to increase oral fluid intake to 1L/day. Monitor Na. 81 y/o male, with a PmHx of CVA with R sided paralysis, Dementia, CAD, CABG, Systolic HF, HTN, HLD and DM, p/w worsening confusion, slurred speech and R shoulder pain x 2 weeks. Unable to obtain ROS and History from the pt due to worsening confusion. Obtain ROS, medications and History from Ervin Jean (SON+HCP) [phone]. As per son pt's baseline mental status alert, oriented to himself and situation with intermittent confusion, but for past 2 weeks pt's confusion got worse and pt was complaining of right shoulder pain. No reported fever, chills, diaphoresis, chest pain, sob or palpitations. Son reports pt has HHA 24/7. In E.D. pt found in hyperkalemia 5.6 and JULIO, he was given calcium gluconate IV and Normal Saline by ED. Pt was admitted to telemetry for further management.    On Admission, EKG done showed NSR @ 99, RBBB (unchanged from 9/2016). CE x 2 neg. MI ruled out.  and K of 5.6. UA neg. Bun/Cr: 54/1.69. Pt CT Head done showed No acute intracranial hemorrhage, mass effect, or midline shift. Unchanged extensive encephalomalacia and gliosis within the bilateral frontoparietal lobes, left greater than right. Unchanged microvascular disease. Neuro c/s done by Dr. Clement, Cards c/s done by Dr. Ervin, GI c/s done by Dr. Reyes, Psychiatry c/s, ID c/s Dr. Gonzalez and Renal c/s done  by Dr. Edmond. JULIO improved with IVF, likely from dehydration and hemodynamically mediated secondary to diuretic ACI-I therapy given hyaline casts on UA with resolution with IV fluids. Lasix was changed to 20mg every other day. As per cardio, no ICD will be done now due to the advanced dementia. As per neuro, AMS likely multifactorial encephalopathy. EEG was negative for seizures. As per GI, pt has GERD w/o esophagitis. During the hospital course, pt was also c/o pain to his left shoulder so an xray was done that showed no fractures, dislocations or ac separation. Mild AC joint arthrosis. Preserved glenohumeral joint space. Chronic mild enthesopathic change along greater tuberosity margin could correlate with degree of underlying rotator cuff disease. Generalized osteopenia otherwise no discrete lytic or blastic lesions.  Pt comfortable at this time and is medically cleared for discharge home as per Dr. Ceja. Pt will be going home with a reinstated 24 hour home health aid. Outpatient follow up.

## 2017-05-25 NOTE — DISCHARGE NOTE ADULT - MEDICATION SUMMARY - MEDICATIONS TO TAKE
I will START or STAY ON the medications listed below when I get home from the hospital:    aspirin 81 mg oral tablet, chewable  -- 1 tab(s) by mouth once a day  -- Indication: For CAD    Zestril 2.5 mg oral tablet  -- 1 tab(s) by mouth once a day  -- Indication: For Benign hypertension    Neurontin 300 mg oral capsule  -- 1 cap(s) by mouth once a day  -- Indication: For Neuropathy    citalopram 10 mg oral tablet  -- 1 tab(s) by mouth once a day  -- Indication: For Depression    simvastatin 20 mg oral tablet  -- 1 tab(s) by mouth once a day (at bedtime)  -- Indication: For CAD    risperiDONE 1 mg oral tablet  -- 1 tab(s) by mouth once a day (at bedtime) MDD:1  -- Indication: For psychosis    risperiDONE 0.5 mg oral tablet  -- 1 tab(s) by mouth once a day in the morning. Do not take if sedation occurs  -- Indication: For psychosis    Toprol-XL 25 mg oral tablet, extended release  -- 0.5 tab(s) by mouth once a day  -- Indication: For CAD    Emla 2.5%-2.5% topical cream  -- 1 application on skin every 3 hours, As needed, Topical moderate to severe pain  -- Indication: For pain    Nyamyc 100,000 units/g topical powder  -- 1 application on skin 3 times a day  -- Indication: For Antifungal    Lasix 20 mg oral tablet  -- 1 tab(s) by mouth once a day  -- Indication: For Chronic systolic HF (heart failure)    Pepcid 20 mg oral tablet  -- 1 tab(s) by mouth 2 times a day  -- Indication: For GERD (Gastroesophageal Reflux Disease)    senna oral tablet  -- 2 tab(s) by mouth once a day (at bedtime), As needed, Constipation  -- Indication: For Constipation    Protonix 40 mg oral delayed release tablet  -- 1 tab(s) by mouth once a day (before a meal)  -- Indication: For GERD (Gastroesophageal Reflux Disease)- New I will START or STAY ON the medications listed below when I get home from the hospital:    aspirin 81 mg oral tablet, chewable  -- 1 tab(s) by mouth once a day  -- Indication: For CAD    Zestril 2.5 mg oral tablet  -- 1 tab(s) by mouth once a day  -- Indication: For Benign hypertension    Neurontin 300 mg oral capsule  -- 1 cap(s) by mouth once a day  -- Indication: For Neuropathy    citalopram 10 mg oral tablet  -- 1 tab(s) by mouth once a day  -- Indication: For Depression    simvastatin 20 mg oral tablet  -- 1 tab(s) by mouth once a day (at bedtime)  -- Indication: For CAD    risperiDONE 1 mg oral tablet  -- 1 tab(s) by mouth once a day (at bedtime) MDD:1  -- Indication: For psychosis    risperiDONE 0.5 mg oral tablet  -- 1 tab(s) by mouth once a day in the morning. Do not take if sedation occurs  -- Indication: For psychosis    Toprol-XL 25 mg oral tablet, extended release  -- 0.5 tab(s) by mouth once a day  -- Indication: For CAD    lidocaine 5% topical film  -- 2 patch by transdermal patch once a day  -- Indication: For PAin    Emla 2.5%-2.5% topical cream  -- 1 application on skin every 3 hours, As needed, Topical moderate to severe pain  -- Indication: For pain    Nyamyc 100,000 units/g topical powder  -- 1 application on skin 3 times a day  -- Indication: For Antifungal    furosemide 20 mg oral tablet  -- 1 tab(s) by mouth every 48 hours  -- Indication: For Chronic systolic HF (heart failure)    Pepcid 20 mg oral tablet  -- 1 tab(s) by mouth 2 times a day  -- Indication: For GERD (Gastroesophageal Reflux Disease)    senna oral tablet  -- 2 tab(s) by mouth once a day (at bedtime), As needed, Constipation  -- Indication: For Constipation    Protonix 40 mg oral delayed release tablet  -- 1 tab(s) by mouth once a day (before a meal)  -- Indication: For GERD (Gastroesophageal Reflux Disease)- New I will START or STAY ON the medications listed below when I get home from the hospital:    aspirin 81 mg oral tablet, chewable  -- 1 tab(s) by mouth once a day  -- Indication: For CAD    Zestril 2.5 mg oral tablet  -- 1 tab(s) by mouth once a day  -- Indication: For Benign hypertension    Neurontin 300 mg oral capsule  -- 1 cap(s) by mouth once a day  -- Indication: For Neuropathy    citalopram 10 mg oral tablet  -- 1 tab(s) by mouth once a day  -- Indication: For Depression    simvastatin 20 mg oral tablet  -- 1 tab(s) by mouth once a day (at bedtime)  -- Indication: For CAD    risperiDONE 1 mg oral tablet  -- 1 tab(s) by mouth once a day (at bedtime) MDD:1  -- Indication: For psychosis    risperiDONE 0.5 mg oral tablet  -- 1 tab(s) by mouth once a day in the morning. Do not take if sedation occurs  -- Indication: For psychosis    metoprolol tartrate 25 mg oral tablet  -- 1 tab(s) by mouth 2 times a day  -- Indication: For HTN (hypertension)    lidocaine 5% topical film  -- 2 patch by transdermal patch once a day  -- Indication: For PAin    Emla 2.5%-2.5% topical cream  -- 1 application on skin every 3 hours, As needed, Topical moderate to severe pain  -- Indication: For pain    Nyamyc 100,000 units/g topical powder  -- 1 application on skin 3 times a day  -- Indication: For Antifungal    furosemide 20 mg oral tablet  -- 1 tab(s) by mouth every 48 hours  -- Indication: For Chronic systolic HF (heart failure)    Pepcid 20 mg oral tablet  -- 1 tab(s) by mouth 2 times a day  -- Indication: For GERD (Gastroesophageal Reflux Disease)    senna oral tablet  -- 2 tab(s) by mouth once a day (at bedtime), As needed, Constipation  -- Indication: For Constipation    Protonix 40 mg oral delayed release tablet  -- 1 tab(s) by mouth once a day (before a meal)  -- Indication: For GERD (Gastroesophageal Reflux Disease)- New

## 2017-05-25 NOTE — DISCHARGE NOTE ADULT - MEDICATION SUMMARY - MEDICATIONS TO CHANGE
I will SWITCH the dose or number of times a day I take the medications listed below when I get home from the hospital:    gabapentin 600 mg oral tablet  -- 1 tab(s) by mouth 3 times a day    furosemide 20 mg oral tablet  -- 1 tab(s) by mouth 2 times a day

## 2017-05-25 NOTE — DISCHARGE NOTE ADULT - CARE PROVIDER_API CALL
Jayme Cruz (DO), Neurology  [address] [place] [address]  Phone: [phone]  Fax: [phone]    Eliel Edmond), Internal Medicine; Nephrology  [address] [place] [address]  Phone: [phone]  Fax: [phone]    Kory Ceja (DANIKA), [facility] Medicine; Internal Medicine  [address] [place] [address]  Phone: [phone]  Fax: [phone]

## 2017-05-25 NOTE — PROGRESS NOTE ADULT - ASSESSMENT
81 yo male PMHx of CVA with R sided paralysis, Dementia, CAD, CABG, Systolic HF, HTN, HLD and DM p/w worsening confusion, slurred speech and R shoulder pain x 2 weeks, found in JULIO and Hyperkalemia.    +Hyperkalemia-->s/p calcium gluconate IV and Normal Saline by ED  +JULIO-->S/p IV Fluids hydration, holding diuretic and ACEI    Recommend:    1.  Evaluate for underlying infectious issues.  No fever or WBC to suggest septic process. No focal findings on clinical exam  2.  Blood cultures, Ucx, UA and chest xray and RVP negative thus far.  3.  Hold off on antibiotics at this time.  4.  Continue to monitor wbc and temp curve.  5.  Stable from ID standpoint.

## 2017-05-25 NOTE — DIETITIAN INITIAL EVALUATION ADULT. - NS AS NUTRI INTERV STRATEGIES
1- Continue current diet order, which remains appropriate at this time. 2- Speech and swallow eval. 3- Monitor weights, labs, BM's, skin integrity, p.o. intake. 4- Please Encourage po intake, assist with meals and menu selections, provide alternatives PRN.

## 2017-05-25 NOTE — DISCHARGE NOTE ADULT - ADDITIONAL INSTRUCTIONS
Follow up with GI Dr. Reyes for poss endoscopy  Cardiology Dr. Olson  Nephrologist Dr. Edmond  Neurologist Dr. Lopez

## 2017-05-25 NOTE — DISCHARGE NOTE ADULT - INSTRUCTIONS
dysphagia Pureed, honey consistency fluids  1800 calorie diabetic diet/ low salt, low fat , low cholesterol

## 2017-05-25 NOTE — PROGRESS NOTE ADULT - SUBJECTIVE AND OBJECTIVE BOX
[facility] NEPHROLOGY- PROGRESS NOTE    82 year old male with history of CHF presents with JULIO and hyperkalemia.    REVIEW OF SYSTEMS:  Gen: no changes in weight  Cards: no chest pain  Resp: no dyspnea  GI: no nausea or vomiting or diarrhea  Vascular: no LE edema    No Known Allergies    Hospital Medications: Medications reviewed    VITALS:  T(F): 97.7, Max: 98.3 ( @ 03:59)  HR: 90  BP: 123/47  RR: 16  SpO2: 99%  Wt(kg): --    PHYSICAL EXAM:    Gen: NAD, calm, dry MM  Cards: RRR, +S1/S2, +CARLOS  Resp: CTA B/L  GI: soft, NT/ND, NABS  Vascular: no LE edema B/L, R AKA    LABS:      148<H>  |  107  |  33<H>  ----------------------------<  199<H>  4.2   |  22  |  1.05    Ca    10.0      25 May 2017 05:13  Mg     1.8           Creatinine Trend: 1.05 <--, 0.97 <--, 0.98 <--, 0.95 <--, 1.36 <--, 1.69 <--                        11.7   7.96  )-----------( 165      ( 25 May 2017 05:13 )             35.7     Urine Studies:  Urinalysis Basic - ( 21 May 2017 00:25 )    Color: PLYEL / Appearance: CLEAR / S.023 / pH: 6.0  Gluc: NEGATIVE / Ketone: NEGATIVE  / Bili: NEGATIVE / Urobili: 2 E.U.   Blood: NEGATIVE / Protein: 30 / Nitrite: NEGATIVE   Leuk Esterase: NEGATIVE / RBC: 0-2 / WBC 0-2   Sq Epi: OCC / Non Sq Epi:  / Bacteria:

## 2017-05-25 NOTE — PROGRESS NOTE ADULT - ASSESSMENT
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 EEG neg for seizures  '2. Psych eval  3. rule out underlying infxn  4. Rpt ct head to assess interval change, if neg may fu as outpt

## 2017-05-26 LAB
BUN SERPL-MCNC: 33 MG/DL — HIGH (ref 7–23)
CALCIUM SERPL-MCNC: 8.9 MG/DL — SIGNIFICANT CHANGE UP (ref 8.4–10.5)
CHLORIDE SERPL-SCNC: 105 MMOL/L — SIGNIFICANT CHANGE UP (ref 98–107)
CO2 SERPL-SCNC: 26 MMOL/L — SIGNIFICANT CHANGE UP (ref 22–31)
CREAT SERPL-MCNC: 0.98 MG/DL — SIGNIFICANT CHANGE UP (ref 0.5–1.3)
GLUCOSE SERPL-MCNC: 242 MG/DL — HIGH (ref 70–99)
HCT VFR BLD CALC: 34 % — LOW (ref 39–50)
HGB BLD-MCNC: 11.2 G/DL — LOW (ref 13–17)
MCHC RBC-ENTMCNC: 31.4 PG — SIGNIFICANT CHANGE UP (ref 27–34)
MCHC RBC-ENTMCNC: 32.9 % — SIGNIFICANT CHANGE UP (ref 32–36)
MCV RBC AUTO: 95.2 FL — SIGNIFICANT CHANGE UP (ref 80–100)
PLATELET # BLD AUTO: 154 K/UL — SIGNIFICANT CHANGE UP (ref 150–400)
PMV BLD: 12 FL — SIGNIFICANT CHANGE UP (ref 7–13)
POTASSIUM SERPL-MCNC: 3.8 MMOL/L — SIGNIFICANT CHANGE UP (ref 3.5–5.3)
POTASSIUM SERPL-SCNC: 3.8 MMOL/L — SIGNIFICANT CHANGE UP (ref 3.5–5.3)
RBC # BLD: 3.57 M/UL — LOW (ref 4.2–5.8)
RBC # FLD: 14.9 % — HIGH (ref 10.3–14.5)
SODIUM SERPL-SCNC: 145 MMOL/L — SIGNIFICANT CHANGE UP (ref 135–145)
WBC # BLD: 7.82 K/UL — SIGNIFICANT CHANGE UP (ref 3.8–10.5)
WBC # FLD AUTO: 7.82 K/UL — SIGNIFICANT CHANGE UP (ref 3.8–10.5)

## 2017-05-26 RX ORDER — FUROSEMIDE 40 MG
1 TABLET ORAL
Qty: 0 | Refills: 0 | COMMUNITY
Start: 2017-05-26

## 2017-05-26 RX ORDER — LIDOCAINE 4 G/100G
2 CREAM TOPICAL
Qty: 0 | Refills: 0 | COMMUNITY
Start: 2017-05-26

## 2017-05-26 RX ORDER — FUROSEMIDE 40 MG
1 TABLET ORAL
Qty: 15 | Refills: 0 | OUTPATIENT
Start: 2017-05-26 | End: 2017-06-25

## 2017-05-26 RX ORDER — LIDOCAINE 4 G/100G
2 CREAM TOPICAL
Qty: 60 | Refills: 0 | OUTPATIENT
Start: 2017-05-26 | End: 2017-06-25

## 2017-05-26 RX ADMIN — SODIUM CHLORIDE 3 MILLILITER(S): 9 INJECTION INTRAMUSCULAR; INTRAVENOUS; SUBCUTANEOUS at 21:12

## 2017-05-26 RX ADMIN — RISPERIDONE 0.5 MILLIGRAM(S): 4 TABLET ORAL at 11:30

## 2017-05-26 RX ADMIN — LIDOCAINE AND PRILOCAINE CREAM 1 APPLICATION(S): 25; 25 CREAM TOPICAL at 04:04

## 2017-05-26 RX ADMIN — RISPERIDONE 1 MILLIGRAM(S): 4 TABLET ORAL at 21:13

## 2017-05-26 RX ADMIN — LISINOPRIL 2.5 MILLIGRAM(S): 2.5 TABLET ORAL at 05:32

## 2017-05-26 RX ADMIN — SODIUM CHLORIDE 3 MILLILITER(S): 9 INJECTION INTRAMUSCULAR; INTRAVENOUS; SUBCUTANEOUS at 14:43

## 2017-05-26 RX ADMIN — SIMVASTATIN 20 MILLIGRAM(S): 20 TABLET, FILM COATED ORAL at 21:13

## 2017-05-26 RX ADMIN — NYSTATIN CREAM 1 APPLICATION(S): 100000 CREAM TOPICAL at 05:33

## 2017-05-26 RX ADMIN — CITALOPRAM 10 MILLIGRAM(S): 10 TABLET, FILM COATED ORAL at 11:30

## 2017-05-26 RX ADMIN — HALOPERIDOL DECANOATE 0.5 MILLIGRAM(S): 100 INJECTION INTRAMUSCULAR at 21:13

## 2017-05-26 RX ADMIN — GABAPENTIN 300 MILLIGRAM(S): 400 CAPSULE ORAL at 11:30

## 2017-05-26 RX ADMIN — PANTOPRAZOLE SODIUM 40 MILLIGRAM(S): 20 TABLET, DELAYED RELEASE ORAL at 05:32

## 2017-05-26 RX ADMIN — HEPARIN SODIUM 5000 UNIT(S): 5000 INJECTION INTRAVENOUS; SUBCUTANEOUS at 14:45

## 2017-05-26 RX ADMIN — Medication 81 MILLIGRAM(S): at 11:30

## 2017-05-26 RX ADMIN — NYSTATIN CREAM 1 APPLICATION(S): 100000 CREAM TOPICAL at 21:13

## 2017-05-26 RX ADMIN — Medication 12.5 MILLIGRAM(S): at 05:32

## 2017-05-26 RX ADMIN — NYSTATIN CREAM 1 APPLICATION(S): 100000 CREAM TOPICAL at 14:45

## 2017-05-26 RX ADMIN — HEPARIN SODIUM 5000 UNIT(S): 5000 INJECTION INTRAVENOUS; SUBCUTANEOUS at 05:31

## 2017-05-26 RX ADMIN — LIDOCAINE 2 PATCH: 4 CREAM TOPICAL at 23:21

## 2017-05-26 RX ADMIN — LIDOCAINE 2 PATCH: 4 CREAM TOPICAL at 11:30

## 2017-05-26 RX ADMIN — SODIUM CHLORIDE 3 MILLILITER(S): 9 INJECTION INTRAMUSCULAR; INTRAVENOUS; SUBCUTANEOUS at 05:30

## 2017-05-26 RX ADMIN — HEPARIN SODIUM 5000 UNIT(S): 5000 INJECTION INTRAVENOUS; SUBCUTANEOUS at 21:13

## 2017-05-26 NOTE — PROGRESS NOTE ADULT - SUBJECTIVE AND OBJECTIVE BOX
[facility] NEPHROLOGY- PROGRESS NOTE    82 year old male with history of CHF presents with JULIO and hyperkalemia.    REVIEW OF SYSTEMS:  Gen: no changes in weight  Cards: no chest pain  Resp: no dyspnea  GI: no nausea or vomiting or diarrhea  Vascular: no LE edema    No Known Allergies    Hospital Medications: Medications reviewed    VITALS:  T(F): 98.9, Max: 98.9 ( @ 05:25)  HR: 87  BP: 144/70  RR: 18  SpO2: 95%  Wt(kg): --    I & Os for current day (as of  @ 09:14)  =============================================  IN: 550 ml / OUT: 0 ml / NET: 550 ml      PHYSICAL EXAM:    Gen: NAD, calm  Cards: RRR, +S1/S2, +CARLOS  Resp: CTA B/L  GI: soft, NT/ND, NABS  Vascular: no LE edema B/L, R AKA    LABS:      145  |  105  |  33<H>  ----------------------------<  242<H>  3.8   |  26  |  0.98    Ca    8.9      26 May 2017 06:40  Mg     1.8           Creatinine Trend: 0.98 <--, 1.13 <--, 1.05 <--, 0.97 <--, 0.98 <--, 0.95 <--, 1.36 <--, 1.69 <--                        11.2   7.82  )-----------( 154      ( 26 May 2017 06:40 )             34.0     Urine Studies:  Urinalysis Basic - ( 21 May 2017 00:25 )    Color: PLYEL / Appearance: CLEAR / S.023 / pH: 6.0  Gluc: NEGATIVE / Ketone: NEGATIVE  / Bili: NEGATIVE / Urobili: 2 E.U.   Blood: NEGATIVE / Protein: 30 / Nitrite: NEGATIVE   Leuk Esterase: NEGATIVE / RBC: 0-2 / WBC 0-2   Sq Epi: OCC / Non Sq Epi:  / Bacteria:

## 2017-05-26 NOTE — PROGRESS NOTE ADULT - ASSESSMENT
83 yo male PMHx of CVA with R sided paralysis, Dementia, CAD, CABG, Systolic HF, HTN, HLD and DM p/w worsening confusion, slurred speech and R shoulder pain x 2 weeks, found in JULIO and Hyperkalemia.    Problem/Plan - 1:  ·  Problem: Hyperkalemia.  Plan: tele monitor  resoled  s/p IV fluids hydration  recheck BMP      Problem/Plan - 2:  ·  Problem: JULIO (acute kidney injury).  Plan: Monitor electrolytes  Trend BUN/Cr  Hold Nephrotoxic meds    Problem/Plan - 3:  ·  Problem: Confusion.  Plan: tele monitor  Aspiration and Falls precautions  HOB elevated 30 degrees   neuro consult  ID consult to ro sepsis    Problem/Plan - 4:  ·  Problem: GERD (Gastroesophageal Reflux Disease).  Plan: started on Protonix 40mg po daily.     Problem/Plan - 5:  ·  Problem: HTN (hypertension)  monitor on current meds    dc planning

## 2017-05-26 NOTE — PROGRESS NOTE ADULT - SUBJECTIVE AND OBJECTIVE BOX
Infectious Diseases progress note:    Subjective:  Sitting in TV room.  NAD.  Awake and alert    ROS:  CONSTITUTIONAL:  Negative fever or chills, feels well  EYES:  Negative  blurry vision or double vision  CARDIOVASCULAR:  Negative for chest pain or palpitations  RESPIRATORY:  Negative for cough, wheezing, or SOB   GASTROINTESTINAL:  Negative for nausea, vomiting, diarrhea, constipation, or abdominal pain  EXTREMITIES:  No cyanosis/clubbing/edema  GENITOURINARY:  Negative frequency, urgency or dysuria  NEUROLOGIC:  No headache, confusion, dizziness, lightheadedness    Allergies    No Known Allergies    Intolerances        ANTIBIOTICS/RELEVANT:  antimicrobials    immunologic:    OTHER:  aspirin enteric coated 81milliGRAM(s) Oral daily  citalopram 10milliGRAM(s) Oral daily  pantoprazole    Tablet 40milliGRAM(s) Oral before breakfast  gabapentin 300milliGRAM(s) Oral daily  risperiDONE   Tablet 0.5milliGRAM(s) Oral <User Schedule>  risperiDONE   Tablet 1milliGRAM(s) Oral at bedtime  senna 2Tablet(s) Oral at bedtime PRN  simvastatin 20milliGRAM(s) Oral at bedtime  heparin  Injectable 5000Unit(s) SubCutaneous every 8 hours  acetaminophen   Tablet. 650milliGRAM(s) Oral every 6 hours PRN  haloperidol    Injectable 0.5milliGRAM(s) IV Push every 6 hours PRN  metoprolol succinate ER 12.5milliGRAM(s) Oral daily  nystatin Powder 1Application(s) Topical three times a day  lisinopril 2.5milliGRAM(s) Oral daily  lidocaine/prilocaine Cream 1Application(s) Topical every 3 hours PRN  lidocaine   Patch 2Patch Transdermal daily  furosemide    Tablet 20milliGRAM(s) Oral every 48 hours      Objective:  Vital Signs Last 24 Hrs  T(C): 37.2, Max: 37.2 (05-26 @ 05:25)  T(F): 98.9, Max: 98.9 (05-26 @ 05:25)  HR: 87 (78 - 87)  BP: 144/70 (144/70 - 159/69)  BP(mean): --  RR: 18 (17 - 18)  SpO2: 95% (95% - 98%)    PHYSICAL EXAM:  Constitutional:NAD  Eyes:KRISSY, EOMI  Ear/Nose/Throat: no oral lesion, no sinus tenderness on percussion	  Neck:no JVD, no lymphadenopathy, supple  Respiratory: CTA ginger  Cardiovascular: S1S2 RRR, no murmurs  Gastrointestinal:soft, (+) BS, no HSM  Extremities:no e/e/c        LABS:                        11.2   7.82  )-----------( 154      ( 26 May 2017 06:40 )             34.0     05-26    145  |  105  |  33<H>  ----------------------------<  242<H>  3.8   |  26  |  0.98    Ca    8.9      26 May 2017 06:40  Mg     1.8     05-25                              MICROBIOLOGY:          RADIOLOGY & ADDITIONAL STUDIES:

## 2017-05-26 NOTE — PROGRESS NOTE ADULT - SUBJECTIVE AND OBJECTIVE BOX
CC : No acute events noted.       PHYSICAL EXAM:  T(C): 37.2, Max: 37.2 (05-26 @ 05:25)  HR: 87 (78 - 87)  BP: 144/70 (144/70 - 159/69)  RR: 18 (17 - 18)  SpO2: 95% (95% - 98%)  Wt(kg): --  I&O's Summary    I & Os for current day (as of 26 May 2017 13:58)  =============================================  IN: 550 ml / OUT: 0 ml / NET: 550 ml        Appearance: Normal	  Cardiovascular: Normal S1 S2,RRR, + Sys Murmur  Respiratory: Lungs clear to auscultation	  Gastrointestinal:  Soft, Non-tender, + BS	  Extremities: No  edema        MEDICATIONS  (STANDING):  aspirin enteric coated 81milliGRAM(s) Oral daily  citalopram 10milliGRAM(s) Oral daily  pantoprazole    Tablet 40milliGRAM(s) Oral before breakfast  gabapentin 300milliGRAM(s) Oral daily  risperiDONE   Tablet 0.5milliGRAM(s) Oral <User Schedule>  risperiDONE   Tablet 1milliGRAM(s) Oral at bedtime  simvastatin 20milliGRAM(s) Oral at bedtime  sodium chloride 0.9% lock flush 3milliLiter(s) IV Push every 8 hours  heparin  Injectable 5000Unit(s) SubCutaneous every 8 hours  metoprolol succinate ER 12.5milliGRAM(s) Oral daily  nystatin Powder 1Application(s) Topical three times a day  lisinopril 2.5milliGRAM(s) Oral daily  lidocaine   Patch 2Patch Transdermal daily  furosemide    Tablet 20milliGRAM(s) Oral every 48 hours      TELEMETRY: 	NSR with 2 degree HB type 1      OTHER: 	    LABS:	 	                            11.2   7.82  )-----------( 154      ( 26 May 2017 06:40 )             34.0     05-26    145  |  105  |  33<H>  ----------------------------<  242<H>  3.8   |  26  |  0.98    Ca    8.9      26 May 2017 06:40  Mg     1.8     05-25

## 2017-05-26 NOTE — PROGRESS NOTE ADULT - SUBJECTIVE AND OBJECTIVE BOX
Patient is a 82y old  Male who presents with a chief complaint of worsening confusion (25 May 2017 06:58)      INTERVAL HPI/OVERNIGHT EVENTS:  T(C): 37.2, Max: 37.2 (05-26 @ 05:25)  HR: 87 (78 - 87)  BP: 144/70 (144/70 - 159/69)  RR: 18 (17 - 18)  SpO2: 95% (95% - 98%)  Wt(kg): --  I&O's Summary    I & Os for current day (as of 26 May 2017 12:35)  =============================================  IN: 550 ml / OUT: 0 ml / NET: 550 ml      LABS:                        11.2   7.82  )-----------( 154      ( 26 May 2017 06:40 )             34.0     05-26    145  |  105  |  33<H>  ----------------------------<  242<H>  3.8   |  26  |  0.98    Ca    8.9      26 May 2017 06:40  Mg     1.8     05-25          CAPILLARY BLOOD GLUCOSE            MEDICATIONS  (STANDING):  aspirin enteric coated 81milliGRAM(s) Oral daily  citalopram 10milliGRAM(s) Oral daily  pantoprazole    Tablet 40milliGRAM(s) Oral before breakfast  gabapentin 300milliGRAM(s) Oral daily  risperiDONE   Tablet 0.5milliGRAM(s) Oral <User Schedule>  risperiDONE   Tablet 1milliGRAM(s) Oral at bedtime  simvastatin 20milliGRAM(s) Oral at bedtime  sodium chloride 0.9% lock flush 3milliLiter(s) IV Push every 8 hours  heparin  Injectable 5000Unit(s) SubCutaneous every 8 hours  metoprolol succinate ER 12.5milliGRAM(s) Oral daily  nystatin Powder 1Application(s) Topical three times a day  lisinopril 2.5milliGRAM(s) Oral daily  lidocaine   Patch 2Patch Transdermal daily  furosemide    Tablet 20milliGRAM(s) Oral every 48 hours    MEDICATIONS  (PRN):  senna 2Tablet(s) Oral at bedtime PRN Constipation  acetaminophen   Tablet. 650milliGRAM(s) Oral every 6 hours PRN mild and moderate pain  haloperidol    Injectable 0.5milliGRAM(s) IV Push every 6 hours PRN Agitation  lidocaine/prilocaine Cream 1Application(s) Topical every 3 hours PRN Topical moderate to severe pain      REVIEW OF SYSTEMS:  CONSTITUTIONAL: No fever, weight loss, or fatigue  EYES: No eye pain, visual disturbances, or discharge  ENMT:  No difficulty hearing, tinnitus, vertigo; No sinus or throat pain  NECK: No pain or stiffness  RESPIRATORY: No cough, wheezing, chills or hemoptysis; No shortness of breath  CARDIOVASCULAR: No chest pain, palpitations, dizziness, or leg swelling  GASTROINTESTINAL: No abdominal or epigastric pain. No nausea, vomiting, or hematemesis; No diarrhea or constipation. No melena or hematochezia.  GENITOURINARY: No dysuria, frequency, hematuria, or incontinence  NEUROLOGICAL: No headaches, memory loss, loss of strength, numbness, or tremors  SKIN: No itching, burning, rashes, or lesions   LYMPH NODES: No enlarged glands  ENDOCRINE: No heat or cold intolerance; No hair loss  MUSCULOSKELETAL: No joint pain or swelling; No muscle, back, or extremity pain  PSYCHIATRIC: No depression, anxiety, mood swings, or difficulty sleeping  HEME/LYMPH: No easy bruising, or bleeding gums  ALLERY AND IMMUNOLOGIC: No hives or eczema    RADIOLOGY & ADDITIONAL TESTS:    Imaging Personally Reviewed:  [ ] YES  [ ] NO    Consultant(s) Notes Reviewed:  [ ] YES  [ ] NO    PHYSICAL EXAM:  GENERAL: NAD, well-groomed, well-developed  HEAD:  Atraumatic, Normocephalic  EYES: EOMI, PERRLA, conjunctiva and sclera clear  ENMT: No tonsillar erythema, exudates, or enlargement; Moist mucous membranes, Good dentition, No lesions  NECK: Supple, No JVD, Normal thyroid  NERVOUS SYSTEM:  Alert & Oriented X3, Good concentration; Motor Strength 5/5 B/L upper and lower extremities; DTRs 2+ intact and symmetric  CHEST/LUNG: Clear to percussion bilaterally; No rales, rhonchi, wheezing, or rubs  HEART: Regular rate and rhythm; No murmurs, rubs, or gallops  ABDOMEN: Soft, Nontender, Nondistended; Bowel sounds present  EXTREMITIES:  2+ Peripheral Pulses, No clubbing, cyanosis, or edema  LYMPH: No lymphadenopathy noted  SKIN: No rashes or lesions    Care Discussed with Consultants/Other Providers [ *] YES  [ ] NO

## 2017-05-26 NOTE — PROGRESS NOTE ADULT - SUBJECTIVE AND OBJECTIVE BOX
Patient is a 82y old  Male who presents with a chief complaint of worsening confusion (25 May 2017 06:58)      HPI:  No events noted, pt difficult historian    rpt ct head stable    Vital Signs Last 24 Hrs  T(C): 36.5, Max: 36.8 (05-25 @ 03:59)  T(F): 97.7, Max: 98.3 (05-25 @ 03:59)  HR: 90 (61 - 90)  BP: 123/47 (93/27 - 148/52)  BP(mean): --  RR: 16 (16 - 17)  SpO2: 99% (95% - 99%)    Physical Exam    Mental Status- awake, confused  CN- 2-12 grossly intact  Motor- R hemiparesis  Sensory- unreliable  Coordination-unreliable  Gait- deferred

## 2017-05-26 NOTE — CHART NOTE - NSCHARTNOTEFT_GEN_A_CORE
5 beats wide complrx tachycardia on telemetry overnight . VSS. Electrolytes sent and  EKG done - reviewed with Cardiology . Continue current treatment. Magnesium repleted

## 2017-05-26 NOTE — PROGRESS NOTE ADULT - ASSESSMENT
81 yo male with CVA, Dementia, CAD, CABG, CMP, AS, Systolic HF, HTN, HLD and DM admitted  worsening dementia / JUILO /  Hyperkalemia.    1. CV stable   -EKG reviewed. NSR with with 1 degree AVB , LAD, RBB, with LVH ; no ischemic changes from previous EKG.   - Sys HF/ CMP: chronic,  EF 23 %.  AICD deferred due to advance dementia  - volume status ok , continue with Lasix as ordered , monitor BMP   - continue with bb/ acei   -monitor i/o    3. HLD : continue with statin     4. HTN : bp acceptable , continue with current antihtn meds     5..  DVT PPX     5. neuro/med/psych  f.u , dc planning per primary team 81 yo male with CVA, Dementia, CAD, CABG, CMP, AS, Systolic HF, HTN, HLD and DM admitted  worsening dementia / JULIO /  Hyperkalemia.    1. CV stable   -no acs  -chronic systolic HF, cont bb/ace, increase as sbp tolerates  -lasix as ordered  -AICD deferred due to advance dementia  -monitor BMP     2. HLD : continue with statin   3. HTN : bp acceptable , continue with current antihtn meds   4. DVT PPX   5. neuro/med/psych  f.u , dc planning per primary team

## 2017-05-26 NOTE — PROGRESS NOTE ADULT - SUBJECTIVE AND OBJECTIVE BOX
INTERVAL HPI/OVERNIGHT EVENTS:    pt is a poor historian, however, is with no abdominal pain and with no reports of melena or brbpr    MEDICATIONS  (STANDING):  aspirin enteric coated 81milliGRAM(s) Oral daily  citalopram 10milliGRAM(s) Oral daily  pantoprazole    Tablet 40milliGRAM(s) Oral before breakfast  gabapentin 300milliGRAM(s) Oral daily  risperiDONE   Tablet 0.5milliGRAM(s) Oral <User Schedule>  risperiDONE   Tablet 1milliGRAM(s) Oral at bedtime  simvastatin 20milliGRAM(s) Oral at bedtime  sodium chloride 0.9% lock flush 3milliLiter(s) IV Push every 8 hours  heparin  Injectable 5000Unit(s) SubCutaneous every 8 hours  metoprolol succinate ER 12.5milliGRAM(s) Oral daily  nystatin Powder 1Application(s) Topical three times a day  lisinopril 2.5milliGRAM(s) Oral daily  lidocaine   Patch 2Patch Transdermal daily  furosemide    Tablet 20milliGRAM(s) Oral every 48 hours    MEDICATIONS  (PRN):  senna 2Tablet(s) Oral at bedtime PRN Constipation  acetaminophen   Tablet. 650milliGRAM(s) Oral every 6 hours PRN mild and moderate pain  haloperidol    Injectable 0.5milliGRAM(s) IV Push every 6 hours PRN Agitation  lidocaine/prilocaine Cream 1Application(s) Topical every 3 hours PRN Topical moderate to severe pain      Allergies    No Known Allergies    Intolerances        Review of Systems:    General:  No wt loss, fevers, chills, night sweats,fatigue,   Eyes:  Good vision, no reported pain  ENT:  No sore throat, pain, runny nose, dysphagia  CV:  No pain, palpitatioins, hypo/hypertension  Resp:  No dyspnea, cough, tachypnea, wheezing  GI:  No pain, No nausea, No vomiting, No diarrhea, No constipatiion, No weight loss, No fever, No pruritis, No rectal bleeding, No tarry stools, No dysphagia,  :  No pain, bleeding, incontinence, nocturia  Muscle:  No pain, weakness  Neuro:  No weakness, tingling, memory problems  Psych:  No fatigue, insomnia, mood problems, depression  Endocrine:  No polyuria, polydypsia, cold/heat intolerance  Heme:  No petechiae, ecchymosis, easy bruisability  Skin:  No rash, tattoos, scars, edema      Vital Signs Last 24 Hrs  T(C): 37.2, Max: 37.2 (05-26 @ 05:25)  T(F): 98.9, Max: 98.9 (05-26 @ 05:25)  HR: 87 (78 - 87)  BP: 144/70 (144/70 - 159/69)  BP(mean): --  RR: 18 (17 - 18)  SpO2: 95% (95% - 98%)    PHYSICAL EXAM:    Constitutional: NAD, well-developed  HEENT: EOMI, throat clear  Neck: No LAD, supple  Respiratory: CTA and P  Cardiovascular: S1 and S2, RRR, no M  Gastrointestinal: BS+, soft, NT/ND, neg HSM,  Extremities: No peripheral edema, neg clubing, cyanosis; decreased mobility 2/2 debility  Vascular: 2+ peripheral pulses  Neurological: A/O x 2, no focal deficits  Psychiatric: Normal mood, normal affect        LABS:                        11.2   7.82  )-----------( 154      ( 26 May 2017 06:40 )             34.0     05-26    145  |  105  |  33<H>  ----------------------------<  242<H>  3.8   |  26  |  0.98    Ca    8.9      26 May 2017 06:40  Mg     1.8     05-25            RADIOLOGY & ADDITIONAL TESTS:

## 2017-05-26 NOTE — PROGRESS NOTE ADULT - ASSESSMENT
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 EEG neg for seizures  '2. Psych fu  3. rule out underlying infxn  4. Rpt ct head negative

## 2017-05-27 LAB
BUN SERPL-MCNC: 26 MG/DL — HIGH (ref 7–23)
CALCIUM SERPL-MCNC: 9.6 MG/DL — SIGNIFICANT CHANGE UP (ref 8.4–10.5)
CHLORIDE SERPL-SCNC: 104 MMOL/L — SIGNIFICANT CHANGE UP (ref 98–107)
CO2 SERPL-SCNC: 23 MMOL/L — SIGNIFICANT CHANGE UP (ref 22–31)
CREAT SERPL-MCNC: 0.81 MG/DL — SIGNIFICANT CHANGE UP (ref 0.5–1.3)
GLUCOSE SERPL-MCNC: 187 MG/DL — HIGH (ref 70–99)
HCT VFR BLD CALC: 36 % — LOW (ref 39–50)
HGB BLD-MCNC: 12 G/DL — LOW (ref 13–17)
MCHC RBC-ENTMCNC: 31.7 PG — SIGNIFICANT CHANGE UP (ref 27–34)
MCHC RBC-ENTMCNC: 33.3 % — SIGNIFICANT CHANGE UP (ref 32–36)
MCV RBC AUTO: 95 FL — SIGNIFICANT CHANGE UP (ref 80–100)
PLATELET # BLD AUTO: 163 K/UL — SIGNIFICANT CHANGE UP (ref 150–400)
PMV BLD: 12.5 FL — SIGNIFICANT CHANGE UP (ref 7–13)
POTASSIUM SERPL-MCNC: 3.8 MMOL/L — SIGNIFICANT CHANGE UP (ref 3.5–5.3)
POTASSIUM SERPL-SCNC: 3.8 MMOL/L — SIGNIFICANT CHANGE UP (ref 3.5–5.3)
RBC # BLD: 3.79 M/UL — LOW (ref 4.2–5.8)
RBC # FLD: 15.2 % — HIGH (ref 10.3–14.5)
SODIUM SERPL-SCNC: 143 MMOL/L — SIGNIFICANT CHANGE UP (ref 135–145)
WBC # BLD: 8.16 K/UL — SIGNIFICANT CHANGE UP (ref 3.8–10.5)
WBC # FLD AUTO: 8.16 K/UL — SIGNIFICANT CHANGE UP (ref 3.8–10.5)

## 2017-05-27 RX ORDER — METOPROLOL TARTRATE 50 MG
25 TABLET ORAL
Qty: 0 | Refills: 0 | Status: DISCONTINUED | OUTPATIENT
Start: 2017-05-27 | End: 2017-05-31

## 2017-05-27 RX ADMIN — PANTOPRAZOLE SODIUM 40 MILLIGRAM(S): 20 TABLET, DELAYED RELEASE ORAL at 06:01

## 2017-05-27 RX ADMIN — SIMVASTATIN 20 MILLIGRAM(S): 20 TABLET, FILM COATED ORAL at 20:19

## 2017-05-27 RX ADMIN — SODIUM CHLORIDE 3 MILLILITER(S): 9 INJECTION INTRAMUSCULAR; INTRAVENOUS; SUBCUTANEOUS at 06:01

## 2017-05-27 RX ADMIN — RISPERIDONE 0.5 MILLIGRAM(S): 4 TABLET ORAL at 10:35

## 2017-05-27 RX ADMIN — RISPERIDONE 1 MILLIGRAM(S): 4 TABLET ORAL at 20:19

## 2017-05-27 RX ADMIN — SODIUM CHLORIDE 3 MILLILITER(S): 9 INJECTION INTRAMUSCULAR; INTRAVENOUS; SUBCUTANEOUS at 15:02

## 2017-05-27 RX ADMIN — SODIUM CHLORIDE 3 MILLILITER(S): 9 INJECTION INTRAMUSCULAR; INTRAVENOUS; SUBCUTANEOUS at 20:16

## 2017-05-27 RX ADMIN — Medication 20 MILLIGRAM(S): at 11:10

## 2017-05-27 RX ADMIN — HEPARIN SODIUM 5000 UNIT(S): 5000 INJECTION INTRAVENOUS; SUBCUTANEOUS at 15:07

## 2017-05-27 RX ADMIN — GABAPENTIN 300 MILLIGRAM(S): 400 CAPSULE ORAL at 11:09

## 2017-05-27 RX ADMIN — LIDOCAINE 2 PATCH: 4 CREAM TOPICAL at 11:09

## 2017-05-27 RX ADMIN — LISINOPRIL 2.5 MILLIGRAM(S): 2.5 TABLET ORAL at 06:01

## 2017-05-27 RX ADMIN — HEPARIN SODIUM 5000 UNIT(S): 5000 INJECTION INTRAVENOUS; SUBCUTANEOUS at 06:01

## 2017-05-27 RX ADMIN — Medication 25 MILLIGRAM(S): at 18:32

## 2017-05-27 RX ADMIN — NYSTATIN CREAM 1 APPLICATION(S): 100000 CREAM TOPICAL at 20:19

## 2017-05-27 RX ADMIN — CITALOPRAM 10 MILLIGRAM(S): 10 TABLET, FILM COATED ORAL at 11:10

## 2017-05-27 RX ADMIN — NYSTATIN CREAM 1 APPLICATION(S): 100000 CREAM TOPICAL at 06:01

## 2017-05-27 RX ADMIN — Medication 12.5 MILLIGRAM(S): at 06:01

## 2017-05-27 RX ADMIN — HEPARIN SODIUM 5000 UNIT(S): 5000 INJECTION INTRAVENOUS; SUBCUTANEOUS at 20:19

## 2017-05-27 RX ADMIN — NYSTATIN CREAM 1 APPLICATION(S): 100000 CREAM TOPICAL at 15:07

## 2017-05-27 RX ADMIN — Medication 81 MILLIGRAM(S): at 11:10

## 2017-05-27 NOTE — PROGRESS NOTE ADULT - SUBJECTIVE AND OBJECTIVE BOX
CARDIOLOGY FOLLOW UP NOTE - DR. BRADSHAW    Subjective:    no chest pain/sob    PHYSICAL EXAM:  T(C): 36.8, Max: 36.8 (05-27 @ 05:58)  HR: 87 (68 - 87)  BP: 135/61 (135/61 - 136/63)  RR: 18 (18 - 20)  SpO2: 99% (99% - 99%)  Wt(kg): --  I&O's Summary      Appearance: Normal	  Cardiovascular: Normal S1 S2,RRR, No JVD, No murmurs  Respiratory: Lungs clear to auscultation	  Gastrointestinal:  Soft, Non-tender, + BS	  Extremities: Normal range of motion, No clubbing, cyanosis or edema      MEDICATIONS  (STANDING):  aspirin enteric coated 81milliGRAM(s) Oral daily  citalopram 10milliGRAM(s) Oral daily  pantoprazole    Tablet 40milliGRAM(s) Oral before breakfast  gabapentin 300milliGRAM(s) Oral daily  risperiDONE   Tablet 0.5milliGRAM(s) Oral <User Schedule>  risperiDONE   Tablet 1milliGRAM(s) Oral at bedtime  simvastatin 20milliGRAM(s) Oral at bedtime  sodium chloride 0.9% lock flush 3milliLiter(s) IV Push every 8 hours  heparin  Injectable 5000Unit(s) SubCutaneous every 8 hours  metoprolol succinate ER 12.5milliGRAM(s) Oral daily  nystatin Powder 1Application(s) Topical three times a day  lisinopril 2.5milliGRAM(s) Oral daily  lidocaine   Patch 2Patch Transdermal daily  furosemide    Tablet 20milliGRAM(s) Oral every 48 hours      TELEMETRY: 	    ECG:  	  RADIOLOGY:   DIAGNOSTIC TESTING:  [ ] Echocardiogram:  [ ] Catheterization:  [ ] Stress Test:    OTHER: 	    LABS:	 	    CARDIAC MARKERS:                                12.0   8.16  )-----------( 163      ( 27 May 2017 06:00 )             36.0     05-27    143  |  104  |  26<H>  ----------------------------<  187<H>  3.8   |  23  |  0.81    Ca    9.6      27 May 2017 06:00  Mg     1.8     05-25      proBNP:     Lipid Profile:   HgA1c:

## 2017-05-27 NOTE — PROGRESS NOTE ADULT - SUBJECTIVE AND OBJECTIVE BOX
INTERVAL HPI/OVERNIGHT EVENTS:  no new gi events    MEDICATIONS  (STANDING):  aspirin enteric coated 81milliGRAM(s) Oral daily  citalopram 10milliGRAM(s) Oral daily  pantoprazole    Tablet 40milliGRAM(s) Oral before breakfast  gabapentin 300milliGRAM(s) Oral daily  risperiDONE   Tablet 0.5milliGRAM(s) Oral <User Schedule>  risperiDONE   Tablet 1milliGRAM(s) Oral at bedtime  simvastatin 20milliGRAM(s) Oral at bedtime  sodium chloride 0.9% lock flush 3milliLiter(s) IV Push every 8 hours  heparin  Injectable 5000Unit(s) SubCutaneous every 8 hours  metoprolol succinate ER 12.5milliGRAM(s) Oral daily  nystatin Powder 1Application(s) Topical three times a day  lisinopril 2.5milliGRAM(s) Oral daily  lidocaine   Patch 2Patch Transdermal daily  furosemide    Tablet 20milliGRAM(s) Oral every 48 hours    MEDICATIONS  (PRN):  senna 2Tablet(s) Oral at bedtime PRN Constipation  acetaminophen   Tablet. 650milliGRAM(s) Oral every 6 hours PRN mild and moderate pain  haloperidol    Injectable 0.5milliGRAM(s) IV Push every 6 hours PRN Agitation  lidocaine/prilocaine Cream 1Application(s) Topical every 3 hours PRN Topical moderate to severe pain      Allergies    No Known Allergies    Intolerances        ROS:   General:  No wt loss, fevers, chills, night sweats, fatigue,   Eyes:  Good vision, no reported pain  ENT:  No sore throat, pain, runny nose, dysphagia  CV:  No pain, palpitations, hypo/hypertension  Resp:  No dyspnea, cough, tachypnea, wheezing  GI:  No pain, No nausea, No vomiting, No diarrhea, No constipation, No weight loss, No fever, No pruritis, No rectal bleeding, No tarry stools, No dysphagia,  :  No pain, bleeding, incontinence, nocturia  Muscle:  No pain, weakness  Neuro:  No weakness, tingling, memory problems  Psych:  No fatigue, insomnia, mood problems, depression  Endocrine:  No polyuria, polydipsia, cold/heat intolerance  Heme:  No petechiae, ecchymosis, easy bruisability  Skin:  No rash, tattoos, scars, edema      PHYSICAL EXAM:   Vital Signs:  Vital Signs Last 24 Hrs  T(C): 36.8, Max: 36.8 (05-27 @ 05:58)  T(F): 98.2, Max: 98.2 (05-27 @ 05:58)  HR: 87 (68 - 87)  BP: 135/61 (135/61 - 136/63)  BP(mean): --  RR: 18 (18 - 20)  SpO2: 99% (99% - 99%)  Daily     Daily     GENERAL:  Appears stated age, well-groomed, well-nourished, no distress  HEENT:  NC/AT,  conjunctivae clear and pink, no thyromegaly, nodules, adenopathy, no JVD, sclera -anicteric  CHEST:  Full & symmetric excursion, no increased effort, breath sounds clear  HEART:  Regular rhythm, S1, S2, no murmur/rub/S3/S4, no abdominal bruit, no edema  ABDOMEN:  Soft, non-tender, non-distended, normoactive bowel sounds,  no masses ,no hepato-splenomegaly, no signs of chronic liver disease  EXTEREMITIES:  no cyanosis,clubbing or edema  SKIN:  No rash/erythema/ecchymoses/petechiae/wounds/abscess/warm/dry  NEURO:  Alert, oriented, no asterixis, no tremor, no encephalopathy      LABS:                        12.0   8.16  )-----------( 163      ( 27 May 2017 06:00 )             36.0     05-27    143  |  104  |  26<H>  ----------------------------<  187<H>  3.8   |  23  |  0.81    Ca    9.6      27 May 2017 06:00  Mg     1.8     05-25            RADIOLOGY & ADDITIONAL TESTS:

## 2017-05-27 NOTE — PROGRESS NOTE ADULT - ASSESSMENT
83 yo male PMHx of CVA with R sided paralysis, Dementia, CAD, CABG, Systolic HF, HTN, HLD and DM p/w worsening confusion, slurred speech and R shoulder pain x 2 weeks, found in JULIO and Hyperkalemia.    Problem/Plan - 1:  ·  Problem: Hyperkalemia.  Plan: tele monitor  resolved  s/p IV fluids hydration  recheck BMP      Problem/Plan - 2:  ·  Problem: JULIO (acute kidney injury).  Plan: Monitor electrolytes  Trend BUN/Cr  Hold Nephrotoxic meds    Problem/Plan - 3:  ·  Problem: Confusion.  Plan: tele monitor  Aspiration and Falls precautions  HOB elevated 30 degrees   neuro consult  ID consult to ro sepsis    Problem/Plan - 4:  ·  Problem: GERD (Gastroesophageal Reflux Disease).  Plan: started on Protonix 40mg po daily.     Problem/Plan - 5:  ·  Problem: HTN (hypertension)  monitor on current meds    dc planning

## 2017-05-27 NOTE — PROGRESS NOTE ADULT - SUBJECTIVE AND OBJECTIVE BOX
Patient is a 82y old  Male who presents with a chief complaint of worsening confusion (25 May 2017 06:58)      INTERVAL HPI/OVERNIGHT EVENTS:  T(C): 36.8, Max: 36.8 (05-27 @ 05:58)  HR: 87 (68 - 87)  BP: 135/61 (135/61 - 136/63)  RR: 18 (18 - 20)  SpO2: 99% (99% - 99%)  Wt(kg): --  I&O's Summary      LABS:                        12.0   8.16  )-----------( 163      ( 27 May 2017 06:00 )             36.0     05-27    143  |  104  |  26<H>  ----------------------------<  187<H>  3.8   |  23  |  0.81    Ca    9.6      27 May 2017 06:00  Mg     1.8     05-25          CAPILLARY BLOOD GLUCOSE            MEDICATIONS  (STANDING):  aspirin enteric coated 81milliGRAM(s) Oral daily  citalopram 10milliGRAM(s) Oral daily  pantoprazole    Tablet 40milliGRAM(s) Oral before breakfast  gabapentin 300milliGRAM(s) Oral daily  risperiDONE   Tablet 0.5milliGRAM(s) Oral <User Schedule>  risperiDONE   Tablet 1milliGRAM(s) Oral at bedtime  simvastatin 20milliGRAM(s) Oral at bedtime  sodium chloride 0.9% lock flush 3milliLiter(s) IV Push every 8 hours  heparin  Injectable 5000Unit(s) SubCutaneous every 8 hours  metoprolol succinate ER 12.5milliGRAM(s) Oral daily  nystatin Powder 1Application(s) Topical three times a day  lisinopril 2.5milliGRAM(s) Oral daily  lidocaine   Patch 2Patch Transdermal daily  furosemide    Tablet 20milliGRAM(s) Oral every 48 hours    MEDICATIONS  (PRN):  senna 2Tablet(s) Oral at bedtime PRN Constipation  acetaminophen   Tablet. 650milliGRAM(s) Oral every 6 hours PRN mild and moderate pain  haloperidol    Injectable 0.5milliGRAM(s) IV Push every 6 hours PRN Agitation  lidocaine/prilocaine Cream 1Application(s) Topical every 3 hours PRN Topical moderate to severe pain      REVIEW OF SYSTEMS:  CONSTITUTIONAL: No fever, weight loss, or fatigue  EYES: No eye pain, visual disturbances, or discharge  ENMT:  No difficulty hearing, tinnitus, vertigo; No sinus or throat pain  NECK: No pain or stiffness  RESPIRATORY: No cough, wheezing, chills or hemoptysis; No shortness of breath  CARDIOVASCULAR: No chest pain, palpitations, dizziness, or leg swelling  GASTROINTESTINAL: No abdominal or epigastric pain. No nausea, vomiting, or hematemesis; No diarrhea or constipation. No melena or hematochezia.  GENITOURINARY: No dysuria, frequency, hematuria, or incontinence  NEUROLOGICAL: No headaches, memory loss, loss of strength, numbness, or tremors  SKIN: No itching, burning, rashes, or lesions   LYMPH NODES: No enlarged glands  ENDOCRINE: No heat or cold intolerance; No hair loss  MUSCULOSKELETAL: No joint pain or swelling; No muscle, back, or extremity pain  PSYCHIATRIC: No depression, anxiety, mood swings, or difficulty sleeping  HEME/LYMPH: No easy bruising, or bleeding gums  ALLERY AND IMMUNOLOGIC: No hives or eczema    RADIOLOGY & ADDITIONAL TESTS:    Imaging Personally Reviewed:  [ ] YES  [ ] NO    Consultant(s) Notes Reviewed:  [ ] YES  [ ] NO    PHYSICAL EXAM:  GENERAL: NAD, well-groomed, well-developed  HEAD:  Atraumatic, Normocephalic  EYES: EOMI, PERRLA, conjunctiva and sclera clear  ENMT: No tonsillar erythema, exudates, or enlargement; Moist mucous membranes, Good dentition, No lesions  NECK: Supple, No JVD, Normal thyroid  NERVOUS SYSTEM:  Alert & Oriented X3, Good concentration; Motor Strength 5/5 B/L upper and lower extremities; DTRs 2+ intact and symmetric  CHEST/LUNG: Clear to percussion bilaterally; No rales, rhonchi, wheezing, or rubs  HEART: Regular rate and rhythm; No murmurs, rubs, or gallops  ABDOMEN: Soft, Nontender, Nondistended; Bowel sounds present  EXTREMITIES:  2+ Peripheral Pulses, No clubbing, cyanosis, or edema  LYMPH: No lymphadenopathy noted  SKIN: No rashes or lesions    Care Discussed with Consultants/Other Providers [* ] YES  [ ] NO

## 2017-05-27 NOTE — PROGRESS NOTE ADULT - SUBJECTIVE AND OBJECTIVE BOX
[facility] NEPHROLOGY- PROGRESS NOTE    82 year old male with history of CHF presents with JULIO and hyperkalemia.    REVIEW OF SYSTEMS:  Gen: no changes in weight  Cards: no chest pain  Resp: no dyspnea  GI: no nausea or vomiting or diarrhea  Vascular: no LE edema    No Known Allergies    Hospital Medications: Medications reviewed    VITALS:  T(F): 98.7, Max: 98.7 ( @ 15:05)  HR: 66  BP: 131/47  RR: 17  SpO2: 98%  Wt(kg): --    PHYSICAL EXAM:  Gen: NAD, calm  Cards: RRR, +S1/S2, +CARLOS  Resp: CTA B/L  GI: soft, NT/ND, NABS  Vascular: no LE edema B/L, R AKA    LABS:      143  |  104  |  26<H>  ----------------------------<  187<H>  3.8   |  23  |  0.81    Ca    9.6      27 May 2017 06:00  Mg     1.8           Creatinine Trend: 0.81 <--, 0.98 <--, 1.13 <--, 1.05 <--, 0.97 <--, 0.98 <--, 0.95 <--, 1.36 <--, 1.69 <--                        12.0   8.16  )-----------( 163      ( 27 May 2017 06:00 )             36.0     Urine Studies:  Urinalysis Basic - ( 21 May 2017 00:25 )    Color: PLYEL / Appearance: CLEAR / S.023 / pH: 6.0  Gluc: NEGATIVE / Ketone: NEGATIVE  / Bili: NEGATIVE / Urobili: 2 E.U.   Blood: NEGATIVE / Protein: 30 / Nitrite: NEGATIVE   Leuk Esterase: NEGATIVE / RBC: 0-2 / WBC 0-2   Sq Epi: OCC / Non Sq Epi:  / Bacteria:

## 2017-05-28 LAB
BACTERIA BLD CULT: SIGNIFICANT CHANGE UP
BUN SERPL-MCNC: 27 MG/DL — HIGH (ref 7–23)
CALCIUM SERPL-MCNC: 9.5 MG/DL — SIGNIFICANT CHANGE UP (ref 8.4–10.5)
CHLORIDE SERPL-SCNC: 104 MMOL/L — SIGNIFICANT CHANGE UP (ref 98–107)
CO2 SERPL-SCNC: 26 MMOL/L — SIGNIFICANT CHANGE UP (ref 22–31)
CREAT SERPL-MCNC: 0.89 MG/DL — SIGNIFICANT CHANGE UP (ref 0.5–1.3)
GLUCOSE SERPL-MCNC: 214 MG/DL — HIGH (ref 70–99)
HCT VFR BLD CALC: 34.3 % — LOW (ref 39–50)
HGB BLD-MCNC: 11 G/DL — LOW (ref 13–17)
MAGNESIUM SERPL-MCNC: 1.9 MG/DL — SIGNIFICANT CHANGE UP (ref 1.6–2.6)
MCHC RBC-ENTMCNC: 30.6 PG — SIGNIFICANT CHANGE UP (ref 27–34)
MCHC RBC-ENTMCNC: 32.1 % — SIGNIFICANT CHANGE UP (ref 32–36)
MCV RBC AUTO: 95.3 FL — SIGNIFICANT CHANGE UP (ref 80–100)
PLATELET # BLD AUTO: 170 K/UL — SIGNIFICANT CHANGE UP (ref 150–400)
PMV BLD: 12.5 FL — SIGNIFICANT CHANGE UP (ref 7–13)
POTASSIUM SERPL-MCNC: 3.8 MMOL/L — SIGNIFICANT CHANGE UP (ref 3.5–5.3)
POTASSIUM SERPL-SCNC: 3.8 MMOL/L — SIGNIFICANT CHANGE UP (ref 3.5–5.3)
RBC # BLD: 3.6 M/UL — LOW (ref 4.2–5.8)
RBC # FLD: 15.1 % — HIGH (ref 10.3–14.5)
SODIUM SERPL-SCNC: 144 MMOL/L — SIGNIFICANT CHANGE UP (ref 135–145)
WBC # BLD: 6.48 K/UL — SIGNIFICANT CHANGE UP (ref 3.8–10.5)
WBC # FLD AUTO: 6.48 K/UL — SIGNIFICANT CHANGE UP (ref 3.8–10.5)

## 2017-05-28 RX ADMIN — PANTOPRAZOLE SODIUM 40 MILLIGRAM(S): 20 TABLET, DELAYED RELEASE ORAL at 06:33

## 2017-05-28 RX ADMIN — HEPARIN SODIUM 5000 UNIT(S): 5000 INJECTION INTRAVENOUS; SUBCUTANEOUS at 06:33

## 2017-05-28 RX ADMIN — SODIUM CHLORIDE 3 MILLILITER(S): 9 INJECTION INTRAMUSCULAR; INTRAVENOUS; SUBCUTANEOUS at 21:04

## 2017-05-28 RX ADMIN — HEPARIN SODIUM 5000 UNIT(S): 5000 INJECTION INTRAVENOUS; SUBCUTANEOUS at 13:02

## 2017-05-28 RX ADMIN — HALOPERIDOL DECANOATE 0.5 MILLIGRAM(S): 100 INJECTION INTRAMUSCULAR at 13:01

## 2017-05-28 RX ADMIN — NYSTATIN CREAM 1 APPLICATION(S): 100000 CREAM TOPICAL at 21:03

## 2017-05-28 RX ADMIN — CITALOPRAM 10 MILLIGRAM(S): 10 TABLET, FILM COATED ORAL at 11:46

## 2017-05-28 RX ADMIN — HEPARIN SODIUM 5000 UNIT(S): 5000 INJECTION INTRAVENOUS; SUBCUTANEOUS at 21:04

## 2017-05-28 RX ADMIN — LISINOPRIL 2.5 MILLIGRAM(S): 2.5 TABLET ORAL at 06:33

## 2017-05-28 RX ADMIN — NYSTATIN CREAM 1 APPLICATION(S): 100000 CREAM TOPICAL at 13:02

## 2017-05-28 RX ADMIN — Medication 650 MILLIGRAM(S): at 13:02

## 2017-05-28 RX ADMIN — Medication 650 MILLIGRAM(S): at 11:46

## 2017-05-28 RX ADMIN — RISPERIDONE 1 MILLIGRAM(S): 4 TABLET ORAL at 21:03

## 2017-05-28 RX ADMIN — SODIUM CHLORIDE 3 MILLILITER(S): 9 INJECTION INTRAMUSCULAR; INTRAVENOUS; SUBCUTANEOUS at 13:02

## 2017-05-28 RX ADMIN — Medication 25 MILLIGRAM(S): at 06:33

## 2017-05-28 RX ADMIN — RISPERIDONE 0.5 MILLIGRAM(S): 4 TABLET ORAL at 11:45

## 2017-05-28 RX ADMIN — GABAPENTIN 300 MILLIGRAM(S): 400 CAPSULE ORAL at 11:46

## 2017-05-28 RX ADMIN — LIDOCAINE 2 PATCH: 4 CREAM TOPICAL at 00:21

## 2017-05-28 RX ADMIN — NYSTATIN CREAM 1 APPLICATION(S): 100000 CREAM TOPICAL at 06:34

## 2017-05-28 RX ADMIN — Medication 81 MILLIGRAM(S): at 11:46

## 2017-05-28 RX ADMIN — SIMVASTATIN 20 MILLIGRAM(S): 20 TABLET, FILM COATED ORAL at 21:03

## 2017-05-28 RX ADMIN — Medication 25 MILLIGRAM(S): at 17:06

## 2017-05-28 RX ADMIN — LIDOCAINE 2 PATCH: 4 CREAM TOPICAL at 11:46

## 2017-05-28 RX ADMIN — SODIUM CHLORIDE 3 MILLILITER(S): 9 INJECTION INTRAMUSCULAR; INTRAVENOUS; SUBCUTANEOUS at 06:34

## 2017-05-28 NOTE — PROGRESS NOTE ADULT - ASSESSMENT
81 yo male PMHx of CVA with R sided paralysis, Dementia, CAD, CABG, Systolic HF, HTN, HLD and DM p/w worsening confusion, slurred speech and R shoulder pain x 2 weeks, found in JULIO and Hyperkalemia.    Problem/Plan - 1:  ·  Problem: Hyperkalemia.  Plan: tele monitor  resolved  s/p IV fluids hydration  recheck BMP      Problem/Plan - 2:  ·  Problem: JULIO (acute kidney injury).  Plan: Monitor electrolytes  Trend BUN/Cr  Hold Nephrotoxic meds    Problem/Plan - 3:  ·  Problem: Confusion.  Plan: tele monitor  Aspiration and Falls precautions  HOB elevated 30 degrees   neuro consult  ID consult to ro sepsis    Problem/Plan - 4:  ·  Problem: GERD (Gastroesophageal Reflux Disease).  Plan: started on Protonix 40mg po daily.     Problem/Plan - 5:  ·  Problem: HTN (hypertension)  monitor on current meds    dc planning

## 2017-05-28 NOTE — PROGRESS NOTE ADULT - SUBJECTIVE AND OBJECTIVE BOX
CARDIOLOGY FOLLOW UP NOTE - DR. BRADSHAW    Subjective:  no chest pain/sob    PHYSICAL EXAM:  T(C): 37.1, Max: 37.2 (05-27 @ 21:13)  HR: 84 (61 - 84)  BP: 146/73 (107/38 - 146/73)  RR: 17 (17 - 18)  SpO2: 97% (97% - 98%)  Wt(kg): --  I&O's Summary      Appearance: Normal	  Cardiovascular: Normal S1 S2,RRR, No JVD, No murmurs  Respiratory: Lungs clear to auscultation	  Gastrointestinal:  Soft, Non-tender, + BS	  Extremities: Normal range of motion, No clubbing, cyanosis or edema      MEDICATIONS  (STANDING):  aspirin enteric coated 81milliGRAM(s) Oral daily  citalopram 10milliGRAM(s) Oral daily  pantoprazole    Tablet 40milliGRAM(s) Oral before breakfast  gabapentin 300milliGRAM(s) Oral daily  risperiDONE   Tablet 0.5milliGRAM(s) Oral <User Schedule>  risperiDONE   Tablet 1milliGRAM(s) Oral at bedtime  simvastatin 20milliGRAM(s) Oral at bedtime  sodium chloride 0.9% lock flush 3milliLiter(s) IV Push every 8 hours  heparin  Injectable 5000Unit(s) SubCutaneous every 8 hours  nystatin Powder 1Application(s) Topical three times a day  lisinopril 2.5milliGRAM(s) Oral daily  lidocaine   Patch 2Patch Transdermal daily  furosemide    Tablet 20milliGRAM(s) Oral every 48 hours  metoprolol 25milliGRAM(s) Oral two times a day      TELEMETRY: 	    ECG:  	  RADIOLOGY:   DIAGNOSTIC TESTING:  [ ] Echocardiogram:  [ ] Catheterization:  [ ] Stress Test:    OTHER: 	    LABS:	 	    CARDIAC MARKERS:                                11.0   6.48  )-----------( 170      ( 28 May 2017 04:48 )             34.3     05-28    144  |  104  |  27<H>  ----------------------------<  214<H>  3.8   |  26  |  0.89    Ca    9.5      28 May 2017 04:48  Mg     1.9     05-28      proBNP:     Lipid Profile:   HgA1c:

## 2017-05-28 NOTE — PROGRESS NOTE ADULT - ASSESSMENT
83 yo male with CVA, Dementia, CAD, CABG, CMP, AS, Systolic HF, HTN, HLD and DM admitted  worsening dementia / JULIO /  Hyperkalemia.    1. CV stable   -chronic systolic HF, cont bb/ace, will uptitrate ace/bb as toelrates   -lasix as ordered  -AICD deferred due to advance dementia  -monitor BMP     2. HLD : continue with statin   3. HTN : bp acceptable  4. DVT PPX   5. neuro/med/psych  f.u , dc planning per primary team

## 2017-05-28 NOTE — PROGRESS NOTE ADULT - SUBJECTIVE AND OBJECTIVE BOX
[facility] NEPHROLOGY- PROGRESS NOTE    82 year old male with history of CHF presents with JULIO and hyperkalemia.    REVIEW OF SYSTEMS: Pt c/o lower back pain.   Gen: no changes in weight  Cards: no chest pain  Resp: no dyspnea  GI: no nausea or vomiting or diarrhea  Vascular: no LE edema    No Known Allergies    Hospital Medications: Medications reviewed    VITALS:  T(F): 98.8, Max: 99 (05-27 @ 21:13)  HR: 84  BP: 146/73  RR: 17  SpO2: 97%  Wt(kg): --    PHYSICAL EXAM:  Gen: NAD, calm  Cards: RRR, +S1/S2, +CARLOS  Resp: CTA B/L  GI: soft, NT/ND, NABS  Vascular: no LE edema B/L, R AKA      LABS:  05-28    144  |  104  |  27<H>  ----------------------------<  214<H>  3.8   |  26  |  0.89    Ca    9.5      28 May 2017 04:48  Mg     1.9     05-28      Creatinine Trend: 0.89 <--, 0.81 <--, 0.98 <--, 1.13 <--, 1.05 <--, 0.97 <--, 0.98 <--, 0.95 <--                        11.0   6.48  )-----------( 170      ( 28 May 2017 04:48 )             34.3     Urine Studies:

## 2017-05-28 NOTE — PROGRESS NOTE ADULT - SUBJECTIVE AND OBJECTIVE BOX
Patient is a 82y old  Male who presents with a chief complaint of worsening confusion (25 May 2017 06:58)      INTERVAL HPI/OVERNIGHT EVENTS:  T(C): 37.1, Max: 37.2 (05-27 @ 21:13)  HR: 84 (61 - 84)  BP: 146/73 (107/38 - 146/73)  RR: 17 (17 - 18)  SpO2: 97% (97% - 98%)  Wt(kg): --  I&O's Summary      LABS:                        11.0   6.48  )-----------( 170      ( 28 May 2017 04:48 )             34.3     05-28    144  |  104  |  27<H>  ----------------------------<  214<H>  3.8   |  26  |  0.89    Ca    9.5      28 May 2017 04:48  Mg     1.9     05-28          CAPILLARY BLOOD GLUCOSE            MEDICATIONS  (STANDING):  aspirin enteric coated 81milliGRAM(s) Oral daily  citalopram 10milliGRAM(s) Oral daily  pantoprazole    Tablet 40milliGRAM(s) Oral before breakfast  gabapentin 300milliGRAM(s) Oral daily  risperiDONE   Tablet 0.5milliGRAM(s) Oral <User Schedule>  risperiDONE   Tablet 1milliGRAM(s) Oral at bedtime  simvastatin 20milliGRAM(s) Oral at bedtime  sodium chloride 0.9% lock flush 3milliLiter(s) IV Push every 8 hours  heparin  Injectable 5000Unit(s) SubCutaneous every 8 hours  nystatin Powder 1Application(s) Topical three times a day  lisinopril 2.5milliGRAM(s) Oral daily  lidocaine   Patch 2Patch Transdermal daily  furosemide    Tablet 20milliGRAM(s) Oral every 48 hours  metoprolol 25milliGRAM(s) Oral two times a day    MEDICATIONS  (PRN):  senna 2Tablet(s) Oral at bedtime PRN Constipation  acetaminophen   Tablet. 650milliGRAM(s) Oral every 6 hours PRN mild and moderate pain  haloperidol    Injectable 0.5milliGRAM(s) IV Push every 6 hours PRN Agitation  lidocaine/prilocaine Cream 1Application(s) Topical every 3 hours PRN Topical moderate to severe pain      REVIEW OF SYSTEMS:  CONSTITUTIONAL: No fever, weight loss, or fatigue  EYES: No eye pain, visual disturbances, or discharge  ENMT:  No difficulty hearing, tinnitus, vertigo; No sinus or throat pain  NECK: No pain or stiffness  RESPIRATORY: No cough, wheezing, chills or hemoptysis; No shortness of breath  CARDIOVASCULAR: No chest pain, palpitations, dizziness, or leg swelling  GASTROINTESTINAL: No abdominal or epigastric pain. No nausea, vomiting, or hematemesis; No diarrhea or constipation. No melena or hematochezia.  GENITOURINARY: No dysuria, frequency, hematuria, or incontinence  NEUROLOGICAL: No headaches, memory loss, loss of strength, numbness, or tremors  SKIN: No itching, burning, rashes, or lesions   LYMPH NODES: No enlarged glands  ENDOCRINE: No heat or cold intolerance; No hair loss  MUSCULOSKELETAL: No joint pain or swelling; No muscle, back, or extremity pain  PSYCHIATRIC: No depression, anxiety, mood swings, or difficulty sleeping  HEME/LYMPH: No easy bruising, or bleeding gums  ALLERY AND IMMUNOLOGIC: No hives or eczema    RADIOLOGY & ADDITIONAL TESTS:    Imaging Personally Reviewed:  [ ] YES  [ ] NO    Consultant(s) Notes Reviewed:  [ ] YES  [ ] NO    PHYSICAL EXAM:  GENERAL: NAD, well-groomed, well-developed  HEAD:  Atraumatic, Normocephalic  EYES: EOMI, PERRLA, conjunctiva and sclera clear  ENMT: No tonsillar erythema, exudates, or enlargement; Moist mucous membranes, Good dentition, No lesions  NECK: Supple, No JVD, Normal thyroid  NERVOUS SYSTEM:  Alert & Oriented X3, Good concentration; Motor Strength 5/5 B/L upper and lower extremities; DTRs 2+ intact and symmetric  CHEST/LUNG: Clear to percussion bilaterally; No rales, rhonchi, wheezing, or rubs  HEART: Regular rate and rhythm; No murmurs, rubs, or gallops  ABDOMEN: Soft, Nontender, Nondistended; Bowel sounds present  EXTREMITIES:  2+ Peripheral Pulses, No clubbing, cyanosis, or edema  LYMPH: No lymphadenopathy noted  SKIN: No rashes or lesions    Care Discussed with Consultants/Other Providers [ *] YES  [ ] NO

## 2017-05-29 LAB
BUN SERPL-MCNC: 30 MG/DL — HIGH (ref 7–23)
CALCIUM SERPL-MCNC: 9.7 MG/DL — SIGNIFICANT CHANGE UP (ref 8.4–10.5)
CHLORIDE SERPL-SCNC: 104 MMOL/L — SIGNIFICANT CHANGE UP (ref 98–107)
CO2 SERPL-SCNC: 23 MMOL/L — SIGNIFICANT CHANGE UP (ref 22–31)
CREAT SERPL-MCNC: 0.92 MG/DL — SIGNIFICANT CHANGE UP (ref 0.5–1.3)
GLUCOSE SERPL-MCNC: 236 MG/DL — HIGH (ref 70–99)
HCT VFR BLD CALC: 35.7 % — LOW (ref 39–50)
HGB BLD-MCNC: 11.7 G/DL — LOW (ref 13–17)
MAGNESIUM SERPL-MCNC: 2.2 MG/DL — SIGNIFICANT CHANGE UP (ref 1.6–2.6)
MCHC RBC-ENTMCNC: 31.3 PG — SIGNIFICANT CHANGE UP (ref 27–34)
MCHC RBC-ENTMCNC: 32.8 % — SIGNIFICANT CHANGE UP (ref 32–36)
MCV RBC AUTO: 95.5 FL — SIGNIFICANT CHANGE UP (ref 80–100)
PLATELET # BLD AUTO: 183 K/UL — SIGNIFICANT CHANGE UP (ref 150–400)
PMV BLD: 12.2 FL — SIGNIFICANT CHANGE UP (ref 7–13)
POTASSIUM SERPL-MCNC: 4 MMOL/L — SIGNIFICANT CHANGE UP (ref 3.5–5.3)
POTASSIUM SERPL-SCNC: 4 MMOL/L — SIGNIFICANT CHANGE UP (ref 3.5–5.3)
RBC # BLD: 3.74 M/UL — LOW (ref 4.2–5.8)
RBC # FLD: 15.2 % — HIGH (ref 10.3–14.5)
SODIUM SERPL-SCNC: 144 MMOL/L — SIGNIFICANT CHANGE UP (ref 135–145)
WBC # BLD: 7.04 K/UL — SIGNIFICANT CHANGE UP (ref 3.8–10.5)
WBC # FLD AUTO: 7.04 K/UL — SIGNIFICANT CHANGE UP (ref 3.8–10.5)

## 2017-05-29 RX ADMIN — Medication 650 MILLIGRAM(S): at 09:05

## 2017-05-29 RX ADMIN — GABAPENTIN 300 MILLIGRAM(S): 400 CAPSULE ORAL at 12:23

## 2017-05-29 RX ADMIN — HEPARIN SODIUM 5000 UNIT(S): 5000 INJECTION INTRAVENOUS; SUBCUTANEOUS at 21:45

## 2017-05-29 RX ADMIN — Medication 81 MILLIGRAM(S): at 12:23

## 2017-05-29 RX ADMIN — LISINOPRIL 2.5 MILLIGRAM(S): 2.5 TABLET ORAL at 07:28

## 2017-05-29 RX ADMIN — LIDOCAINE 2 PATCH: 4 CREAM TOPICAL at 00:10

## 2017-05-29 RX ADMIN — SIMVASTATIN 20 MILLIGRAM(S): 20 TABLET, FILM COATED ORAL at 21:46

## 2017-05-29 RX ADMIN — NYSTATIN CREAM 1 APPLICATION(S): 100000 CREAM TOPICAL at 05:03

## 2017-05-29 RX ADMIN — RISPERIDONE 0.5 MILLIGRAM(S): 4 TABLET ORAL at 09:04

## 2017-05-29 RX ADMIN — HEPARIN SODIUM 5000 UNIT(S): 5000 INJECTION INTRAVENOUS; SUBCUTANEOUS at 06:26

## 2017-05-29 RX ADMIN — RISPERIDONE 1 MILLIGRAM(S): 4 TABLET ORAL at 21:46

## 2017-05-29 RX ADMIN — NYSTATIN CREAM 1 APPLICATION(S): 100000 CREAM TOPICAL at 14:51

## 2017-05-29 RX ADMIN — HEPARIN SODIUM 5000 UNIT(S): 5000 INJECTION INTRAVENOUS; SUBCUTANEOUS at 14:52

## 2017-05-29 RX ADMIN — PANTOPRAZOLE SODIUM 40 MILLIGRAM(S): 20 TABLET, DELAYED RELEASE ORAL at 05:03

## 2017-05-29 RX ADMIN — Medication 650 MILLIGRAM(S): at 04:15

## 2017-05-29 RX ADMIN — Medication 25 MILLIGRAM(S): at 17:04

## 2017-05-29 RX ADMIN — SODIUM CHLORIDE 3 MILLILITER(S): 9 INJECTION INTRAMUSCULAR; INTRAVENOUS; SUBCUTANEOUS at 21:46

## 2017-05-29 RX ADMIN — SODIUM CHLORIDE 3 MILLILITER(S): 9 INJECTION INTRAMUSCULAR; INTRAVENOUS; SUBCUTANEOUS at 05:02

## 2017-05-29 RX ADMIN — Medication 650 MILLIGRAM(S): at 03:09

## 2017-05-29 RX ADMIN — SODIUM CHLORIDE 3 MILLILITER(S): 9 INJECTION INTRAMUSCULAR; INTRAVENOUS; SUBCUTANEOUS at 14:51

## 2017-05-29 RX ADMIN — CITALOPRAM 10 MILLIGRAM(S): 10 TABLET, FILM COATED ORAL at 12:23

## 2017-05-29 RX ADMIN — NYSTATIN CREAM 1 APPLICATION(S): 100000 CREAM TOPICAL at 21:46

## 2017-05-29 RX ADMIN — LIDOCAINE 2 PATCH: 4 CREAM TOPICAL at 12:21

## 2017-05-29 RX ADMIN — Medication 25 MILLIGRAM(S): at 07:26

## 2017-05-29 RX ADMIN — Medication 650 MILLIGRAM(S): at 10:09

## 2017-05-29 RX ADMIN — Medication 20 MILLIGRAM(S): at 12:23

## 2017-05-29 NOTE — PROGRESS NOTE ADULT - SUBJECTIVE AND OBJECTIVE BOX
CARDIOLOGY FOLLOW UP NOTE - DR. BRADSHAW    Subjective:  no chest pain/sob    PHYSICAL EXAM:  T(C): 36.6, Max: 36.8 (05-28 @ 15:52)  HR: 93 (64 - 94)  BP: 154/64 (126/51 - 154/64)  RR: 18 (17 - 20)  SpO2: 100% (96% - 100%)  Wt(kg): --  I&O's Summary      Appearance: Normal	  Cardiovascular: Normal S1 S2,RRR, No JVD, No murmurs  Respiratory: Lungs clear to auscultation	  Gastrointestinal:  Soft, Non-tender, + BS	  Extremities: Normal range of motion, No clubbing, cyanosis or edema      MEDICATIONS  (STANDING):  aspirin enteric coated 81milliGRAM(s) Oral daily  citalopram 10milliGRAM(s) Oral daily  pantoprazole    Tablet 40milliGRAM(s) Oral before breakfast  gabapentin 300milliGRAM(s) Oral daily  risperiDONE   Tablet 0.5milliGRAM(s) Oral <User Schedule>  risperiDONE   Tablet 1milliGRAM(s) Oral at bedtime  simvastatin 20milliGRAM(s) Oral at bedtime  sodium chloride 0.9% lock flush 3milliLiter(s) IV Push every 8 hours  heparin  Injectable 5000Unit(s) SubCutaneous every 8 hours  nystatin Powder 1Application(s) Topical three times a day  lisinopril 2.5milliGRAM(s) Oral daily  lidocaine   Patch 2Patch Transdermal daily  furosemide    Tablet 20milliGRAM(s) Oral every 48 hours  metoprolol 25milliGRAM(s) Oral two times a day      TELEMETRY: 	    ECG:  	  RADIOLOGY:   DIAGNOSTIC TESTING:  [ ] Echocardiogram:  [ ] Catheterization:  [ ] Stress Test:    OTHER: 	    LABS:	 	    CARDIAC MARKERS:                                11.7   7.04  )-----------( 183      ( 29 May 2017 05:43 )             35.7     05-29    144  |  104  |  30<H>  ----------------------------<  236<H>  4.0   |  23  |  0.92    Ca    9.7      29 May 2017 05:43  Mg     2.2     05-29      proBNP:     Lipid Profile:   HgA1c:

## 2017-05-29 NOTE — PROGRESS NOTE ADULT - SUBJECTIVE AND OBJECTIVE BOX
Patient is a 82y old  Male who presents with a chief complaint of worsening confusion (25 May 2017 06:58)      INTERVAL HPI/OVERNIGHT EVENTS:  T(C): 36.6, Max: 36.8 (05-28 @ 15:52)  HR: 93 (64 - 94)  BP: 154/64 (126/51 - 154/64)  RR: 18 (17 - 20)  SpO2: 100% (96% - 100%)  Wt(kg): --  I&O's Summary      LABS:                        11.7   7.04  )-----------( 183      ( 29 May 2017 05:43 )             35.7     05-29    144  |  104  |  30<H>  ----------------------------<  236<H>  4.0   |  23  |  0.92    Ca    9.7      29 May 2017 05:43  Mg     2.2     05-29          CAPILLARY BLOOD GLUCOSE            MEDICATIONS  (STANDING):  aspirin enteric coated 81milliGRAM(s) Oral daily  citalopram 10milliGRAM(s) Oral daily  pantoprazole    Tablet 40milliGRAM(s) Oral before breakfast  gabapentin 300milliGRAM(s) Oral daily  risperiDONE   Tablet 0.5milliGRAM(s) Oral <User Schedule>  risperiDONE   Tablet 1milliGRAM(s) Oral at bedtime  simvastatin 20milliGRAM(s) Oral at bedtime  sodium chloride 0.9% lock flush 3milliLiter(s) IV Push every 8 hours  heparin  Injectable 5000Unit(s) SubCutaneous every 8 hours  nystatin Powder 1Application(s) Topical three times a day  lisinopril 2.5milliGRAM(s) Oral daily  lidocaine   Patch 2Patch Transdermal daily  furosemide    Tablet 20milliGRAM(s) Oral every 48 hours  metoprolol 25milliGRAM(s) Oral two times a day    MEDICATIONS  (PRN):  senna 2Tablet(s) Oral at bedtime PRN Constipation  acetaminophen   Tablet. 650milliGRAM(s) Oral every 6 hours PRN mild and moderate pain  haloperidol    Injectable 0.5milliGRAM(s) IV Push every 6 hours PRN Agitation  lidocaine/prilocaine Cream 1Application(s) Topical every 3 hours PRN Topical moderate to severe pain      REVIEW OF SYSTEMS:  CONSTITUTIONAL: No fever, weight loss, or fatigue  EYES: No eye pain, visual disturbances, or discharge  ENMT:  No difficulty hearing, tinnitus, vertigo; No sinus or throat pain  NECK: No pain or stiffness  RESPIRATORY: No cough, wheezing, chills or hemoptysis; No shortness of breath  CARDIOVASCULAR: No chest pain, palpitations, dizziness, or leg swelling  GASTROINTESTINAL: No abdominal or epigastric pain. No nausea, vomiting, or hematemesis; No diarrhea or constipation. No melena or hematochezia.  GENITOURINARY: No dysuria, frequency, hematuria, or incontinence  NEUROLOGICAL: No headaches, memory loss, loss of strength, numbness, or tremors  SKIN: No itching, burning, rashes, or lesions   LYMPH NODES: No enlarged glands  ENDOCRINE: No heat or cold intolerance; No hair loss  MUSCULOSKELETAL: No joint pain or swelling; No muscle, back, or extremity pain  PSYCHIATRIC: No depression, anxiety, mood swings, or difficulty sleeping  HEME/LYMPH: No easy bruising, or bleeding gums  ALLERY AND IMMUNOLOGIC: No hives or eczema    RADIOLOGY & ADDITIONAL TESTS:    Imaging Personally Reviewed:  [ ] YES  [ ] NO    Consultant(s) Notes Reviewed:  [ ] YES  [ ] NO    PHYSICAL EXAM:  GENERAL: NAD, well-groomed, well-developed  HEAD:  Atraumatic, Normocephalic  EYES: EOMI, PERRLA, conjunctiva and sclera clear  ENMT: No tonsillar erythema, exudates, or enlargement; Moist mucous membranes, Good dentition, No lesions  NECK: Supple, No JVD, Normal thyroid  NERVOUS SYSTEM:  Alert & Oriented X3, Good concentration; Motor Strength 5/5 B/L upper and lower extremities; DTRs 2+ intact and symmetric  CHEST/LUNG: Clear to percussion bilaterally; No rales, rhonchi, wheezing, or rubs  HEART: Regular rate and rhythm; No murmurs, rubs, or gallops  ABDOMEN: Soft, Nontender, Nondistended; Bowel sounds present  EXTREMITIES:  2+ Peripheral Pulses, No clubbing, cyanosis, or edema  LYMPH: No lymphadenopathy noted  SKIN: No rashes or lesions    Care Discussed with Consultants/Other Providers [ ] YES  [ ] NO

## 2017-05-29 NOTE — PROGRESS NOTE ADULT - SUBJECTIVE AND OBJECTIVE BOX
[facility] NEPHROLOGY- PROGRESS NOTE    82 year old male with history of CHF presents with JULIO and hyperkalemia.    REVIEW OF SYSTEMS: Pt denies any complaints.   Gen: no changes in weight  Cards: no chest pain  Resp: no dyspnea  GI: no nausea or vomiting or diarrhea  Vascular: no LE edema    No Known Allergies    Hospital Medications: Medications reviewed    VITALS:  T(F): 97.8, Max: 98 (05-28 @ 17:04)  HR: 93  BP: 154/64  RR: 18  SpO2: 100%  Wt(kg): --    PHYSICAL EXAM:  Gen: NAD, calm  Cards: RRR, +S1/S2, +ACRLOS  Resp: CTA ant.   GI: soft, NT/ND, NABS  Vascular: no LE edema B/L, R AKA    LABS:  05-29    144  |  104  |  30<H>  ----------------------------<  236<H>  4.0   |  23  |  0.92    Ca    9.7      29 May 2017 05:43  Mg     2.2     05-29      Creatinine Trend: 0.92 <--, 0.89 <--, 0.81 <--, 0.98 <--, 1.13 <--, 1.05 <--, 0.97 <--, 0.98 <--                        11.7   7.04  )-----------( 183      ( 29 May 2017 05:43 )             35.7     Urine Studies:

## 2017-05-30 LAB
BUN SERPL-MCNC: 30 MG/DL — HIGH (ref 7–23)
CALCIUM SERPL-MCNC: 9.7 MG/DL — SIGNIFICANT CHANGE UP (ref 8.4–10.5)
CHLORIDE SERPL-SCNC: 101 MMOL/L — SIGNIFICANT CHANGE UP (ref 98–107)
CO2 SERPL-SCNC: 25 MMOL/L — SIGNIFICANT CHANGE UP (ref 22–31)
CREAT SERPL-MCNC: 0.88 MG/DL — SIGNIFICANT CHANGE UP (ref 0.5–1.3)
GLUCOSE SERPL-MCNC: 201 MG/DL — HIGH (ref 70–99)
HCT VFR BLD CALC: 36.4 % — LOW (ref 39–50)
HGB BLD-MCNC: 12 G/DL — LOW (ref 13–17)
MAGNESIUM SERPL-MCNC: 2 MG/DL — SIGNIFICANT CHANGE UP (ref 1.6–2.6)
MCHC RBC-ENTMCNC: 31.4 PG — SIGNIFICANT CHANGE UP (ref 27–34)
MCHC RBC-ENTMCNC: 33 % — SIGNIFICANT CHANGE UP (ref 32–36)
MCV RBC AUTO: 95.3 FL — SIGNIFICANT CHANGE UP (ref 80–100)
PLATELET # BLD AUTO: 182 K/UL — SIGNIFICANT CHANGE UP (ref 150–400)
PMV BLD: 12.1 FL — SIGNIFICANT CHANGE UP (ref 7–13)
POTASSIUM SERPL-MCNC: 3.8 MMOL/L — SIGNIFICANT CHANGE UP (ref 3.5–5.3)
POTASSIUM SERPL-SCNC: 3.8 MMOL/L — SIGNIFICANT CHANGE UP (ref 3.5–5.3)
RBC # BLD: 3.82 M/UL — LOW (ref 4.2–5.8)
RBC # FLD: 15.6 % — HIGH (ref 10.3–14.5)
SODIUM SERPL-SCNC: 142 MMOL/L — SIGNIFICANT CHANGE UP (ref 135–145)
WBC # BLD: 6.59 K/UL — SIGNIFICANT CHANGE UP (ref 3.8–10.5)
WBC # FLD AUTO: 6.59 K/UL — SIGNIFICANT CHANGE UP (ref 3.8–10.5)

## 2017-05-30 RX ADMIN — Medication 650 MILLIGRAM(S): at 10:55

## 2017-05-30 RX ADMIN — HEPARIN SODIUM 5000 UNIT(S): 5000 INJECTION INTRAVENOUS; SUBCUTANEOUS at 14:28

## 2017-05-30 RX ADMIN — Medication 650 MILLIGRAM(S): at 19:57

## 2017-05-30 RX ADMIN — HEPARIN SODIUM 5000 UNIT(S): 5000 INJECTION INTRAVENOUS; SUBCUTANEOUS at 21:55

## 2017-05-30 RX ADMIN — RISPERIDONE 1 MILLIGRAM(S): 4 TABLET ORAL at 21:55

## 2017-05-30 RX ADMIN — SODIUM CHLORIDE 3 MILLILITER(S): 9 INJECTION INTRAMUSCULAR; INTRAVENOUS; SUBCUTANEOUS at 05:42

## 2017-05-30 RX ADMIN — NYSTATIN CREAM 1 APPLICATION(S): 100000 CREAM TOPICAL at 21:55

## 2017-05-30 RX ADMIN — HEPARIN SODIUM 5000 UNIT(S): 5000 INJECTION INTRAVENOUS; SUBCUTANEOUS at 05:41

## 2017-05-30 RX ADMIN — Medication 25 MILLIGRAM(S): at 05:42

## 2017-05-30 RX ADMIN — Medication 650 MILLIGRAM(S): at 03:40

## 2017-05-30 RX ADMIN — RISPERIDONE 0.5 MILLIGRAM(S): 4 TABLET ORAL at 10:04

## 2017-05-30 RX ADMIN — SODIUM CHLORIDE 3 MILLILITER(S): 9 INJECTION INTRAMUSCULAR; INTRAVENOUS; SUBCUTANEOUS at 14:28

## 2017-05-30 RX ADMIN — Medication 650 MILLIGRAM(S): at 20:20

## 2017-05-30 RX ADMIN — NYSTATIN CREAM 1 APPLICATION(S): 100000 CREAM TOPICAL at 05:42

## 2017-05-30 RX ADMIN — SODIUM CHLORIDE 3 MILLILITER(S): 9 INJECTION INTRAMUSCULAR; INTRAVENOUS; SUBCUTANEOUS at 21:55

## 2017-05-30 RX ADMIN — Medication 650 MILLIGRAM(S): at 02:44

## 2017-05-30 RX ADMIN — GABAPENTIN 300 MILLIGRAM(S): 400 CAPSULE ORAL at 11:00

## 2017-05-30 RX ADMIN — SIMVASTATIN 20 MILLIGRAM(S): 20 TABLET, FILM COATED ORAL at 21:55

## 2017-05-30 RX ADMIN — LIDOCAINE 2 PATCH: 4 CREAM TOPICAL at 00:40

## 2017-05-30 RX ADMIN — Medication 81 MILLIGRAM(S): at 11:00

## 2017-05-30 RX ADMIN — Medication 650 MILLIGRAM(S): at 11:50

## 2017-05-30 RX ADMIN — NYSTATIN CREAM 1 APPLICATION(S): 100000 CREAM TOPICAL at 14:28

## 2017-05-30 RX ADMIN — LIDOCAINE 2 PATCH: 4 CREAM TOPICAL at 11:00

## 2017-05-30 RX ADMIN — HALOPERIDOL DECANOATE 0.5 MILLIGRAM(S): 100 INJECTION INTRAMUSCULAR at 19:54

## 2017-05-30 RX ADMIN — PANTOPRAZOLE SODIUM 40 MILLIGRAM(S): 20 TABLET, DELAYED RELEASE ORAL at 05:41

## 2017-05-30 RX ADMIN — CITALOPRAM 10 MILLIGRAM(S): 10 TABLET, FILM COATED ORAL at 11:00

## 2017-05-30 RX ADMIN — LISINOPRIL 2.5 MILLIGRAM(S): 2.5 TABLET ORAL at 05:42

## 2017-05-30 RX ADMIN — Medication 25 MILLIGRAM(S): at 17:23

## 2017-05-30 NOTE — PROGRESS NOTE ADULT - PROBLEM SELECTOR PROBLEM 1
Acute kidney injury
Gastroesophageal reflux disease without esophagitis
Acute kidney injury

## 2017-05-30 NOTE — PROGRESS NOTE ADULT - SUBJECTIVE AND OBJECTIVE BOX
[facility] NEPHROLOGY- PROGRESS NOTE    82 year old male with history of CHF presents with JULIO and hyperkalemia.    REVIEW OF SYSTEMS:  Gen: no changes in weight  Cards: no chest pain  Resp: no dyspnea  GI: no nausea or vomiting or diarrhea  Vascular: no LE edema    No Known Allergies    Hospital Medications: Medications reviewed    VITALS:  T(F): 98.1, Max: 98.1 (05-29 @ 17:01)  HR: 64  BP: 146/60  RR: 17  SpO2: 100%  Wt(kg): --    PHYSICAL EXAM:    Gen: NAD, calm  Cards: RRR, +S1/S2, +CARLOS  Resp: CTA B/L  GI: soft, NT/ND, NABS  Vascular: no LE edema B/L, R AKA    LABS:  05-30    142  |  101  |  30<H>  ----------------------------<  201<H>  3.8   |  25  |  0.88    Ca    9.7      30 May 2017 05:30  Mg     2.0     05-30      Creatinine Trend: 0.88 <--, 0.92 <--, 0.89 <--, 0.81 <--, 0.98 <--, 1.13 <--, 1.05 <--, 0.97 <--                        12.0   6.59  )-----------( 182      ( 30 May 2017 05:30 )             36.4     Urine Studies: N/A

## 2017-05-30 NOTE — PROGRESS NOTE ADULT - SUBJECTIVE AND OBJECTIVE BOX
Patient is a 82y old  Male who presents with a chief complaint of worsening confusion (25 May 2017 06:58)      HPI:  Pt seen, no events noted, pt confused    Vital Signs Last 24 Hrs  T(C): 36.7, Max: 36.7 (05-29 @ 17:01)  T(F): 98.1, Max: 98.1 (05-29 @ 17:01)  HR: 64 (64 - 70)  BP: 146/60 (140/59 - 148/62)  BP(mean): --  RR: 17 (16 - 17)  SpO2: 100% (100% - 100%)    Physical Exam    Mental Status- AAwake,alert,confused  CN- 2-12 grossly intact  Motor- EISENBERG, limited R side  Sensory- intact Lt

## 2017-05-30 NOTE — PROGRESS NOTE ADULT - PROBLEM SELECTOR PROBLEM 4
Hyperkalemia

## 2017-05-30 NOTE — PROGRESS NOTE ADULT - SUBJECTIVE AND OBJECTIVE BOX
INTERVAL HPI/OVERNIGHT EVENTS:    no diarrhea no constipation no abd pain  tolerating PO intake    MEDICATIONS  (STANDING):  aspirin enteric coated 81milliGRAM(s) Oral daily  citalopram 10milliGRAM(s) Oral daily  pantoprazole    Tablet 40milliGRAM(s) Oral before breakfast  gabapentin 300milliGRAM(s) Oral daily  risperiDONE   Tablet 0.5milliGRAM(s) Oral <User Schedule>  risperiDONE   Tablet 1milliGRAM(s) Oral at bedtime  simvastatin 20milliGRAM(s) Oral at bedtime  sodium chloride 0.9% lock flush 3milliLiter(s) IV Push every 8 hours  heparin  Injectable 5000Unit(s) SubCutaneous every 8 hours  nystatin Powder 1Application(s) Topical three times a day  lisinopril 2.5milliGRAM(s) Oral daily  lidocaine   Patch 2Patch Transdermal daily  furosemide    Tablet 20milliGRAM(s) Oral every 48 hours  metoprolol 25milliGRAM(s) Oral two times a day    MEDICATIONS  (PRN):  senna 2Tablet(s) Oral at bedtime PRN Constipation  acetaminophen   Tablet. 650milliGRAM(s) Oral every 6 hours PRN mild and moderate pain  haloperidol    Injectable 0.5milliGRAM(s) IV Push every 6 hours PRN Agitation  lidocaine/prilocaine Cream 1Application(s) Topical every 3 hours PRN Topical moderate to severe pain      Allergies    No Known Allergies    Intolerances        Review of Systems:    General:  No wt loss, fevers, chills, night sweats,fatigue,   Eyes:  Good vision, no reported pain  ENT:  No sore throat, pain, runny nose, dysphagia  CV:  No pain, palpitatioins, hypo/hypertension  Resp:  No dyspnea, cough, tachypnea, wheezing  GI:  No pain, No nausea, No vomiting, No diarrhea, No constipatiion, No weight loss, No fever, No pruritis, No rectal bleeding, No tarry stools, No dysphagia,  :  No pain, bleeding, incontinence, nocturia  Muscle:  No pain, weakness  Neuro:  No weakness, tingling, memory problems  Psych:  No fatigue, insomnia, mood problems, depression  Endocrine:  No polyuria, polydypsia, cold/heat intolerance  Heme:  No petechiae, ecchymosis, easy bruisability  Skin:  No rash, tattoos, scars, edema      Vital Signs Last 24 Hrs  T(C): 36.7, Max: 36.7 (05-29 @ 17:01)  T(F): 98.1, Max: 98.1 (05-29 @ 17:01)  HR: 64 (64 - 70)  BP: 146/60 (140/59 - 148/62)  BP(mean): --  RR: 17 (16 - 17)  SpO2: 100% (100% - 100%)    PHYSICAL EXAM:    Constitutional: NAD, well-developed  HEENT: EOMI, throat clear  Neck: No LAD, supple  Respiratory: CTA and P  Cardiovascular: S1 and S2, RRR, no M  Gastrointestinal: BS+, soft, NT/ND, neg HSM,  Extremities: No peripheral edema, neg clubing, cyanosis  Vascular: 2+ peripheral pulses  Neurological: A/O x 2; no focal deficits  Psychiatric: Normal mood, normal affect  Skin: No rashes      LABS:                        12.0   6.59  )-----------( 182      ( 30 May 2017 05:30 )             36.4     05-30    142  |  101  |  30<H>  ----------------------------<  201<H>  3.8   |  25  |  0.88    Ca    9.7      30 May 2017 05:30  Mg     2.0     05-30            RADIOLOGY & ADDITIONAL TESTS:

## 2017-05-30 NOTE — PROGRESS NOTE ADULT - PROBLEM SELECTOR PLAN 4
Secondary to JULIO, ACE-I and potassium supplementation now resolved. Monitor K.

## 2017-05-30 NOTE — PROGRESS NOTE ADULT - SUBJECTIVE AND OBJECTIVE BOX
CC: pt comfortable, no cp/sob    TELEMETRY:     PHYSICAL EXAM:    T(C): 36.7, Max: 36.7 (05-29 @ 17:01)  HR: 64 (64 - 70)  BP: 146/60 (140/59 - 148/62)  RR: 17 (16 - 17)  SpO2: 100% (100% - 100%)  Wt(kg): --  I&O's Summary      Appearance: Normal	  Cardiovascular: Normal S1 S2,RRR, No JVD, No murmurs  Respiratory: Lungs clear to auscultation	  Gastrointestinal:  Soft, Non-tender, + BS	  Extremities: Normal range of motion, No clubbing, cyanosis or edema       LABS:	 	                          12.0   6.59  )-----------( 182      ( 30 May 2017 05:30 )             36.4     05-30    142  |  101  |  30<H>  ----------------------------<  201<H>  3.8   |  25  |  0.88    Ca    9.7      30 May 2017 05:30  Mg     2.0     05-30            CARDIAC MARKERS:

## 2017-05-30 NOTE — PROGRESS NOTE ADULT - PROBLEM SELECTOR PROBLEM 3
Bilateral edema of lower extremity

## 2017-05-30 NOTE — PROGRESS NOTE ADULT - ASSESSMENT
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 EEG neg for seizures  '2. Psych fu  3. rule out underlying infxn  4. Rpt ct head negative

## 2017-05-30 NOTE — PROGRESS NOTE ADULT - SUBJECTIVE AND OBJECTIVE BOX
Patient is a 82y old  Male who presents with a chief complaint of worsening confusion (25 May 2017 06:58)      INTERVAL HPI/OVERNIGHT EVENTS:  T(C): 36.7, Max: 36.7 (05-29 @ 17:01)  HR: 64 (64 - 70)  BP: 146/60 (140/59 - 148/62)  RR: 17 (16 - 17)  SpO2: 100% (100% - 100%)  Wt(kg): --  I&O's Summary      LABS:                        12.0   6.59  )-----------( 182      ( 30 May 2017 05:30 )             36.4     05-30    142  |  101  |  30<H>  ----------------------------<  201<H>  3.8   |  25  |  0.88    Ca    9.7      30 May 2017 05:30  Mg     2.0     05-30          CAPILLARY BLOOD GLUCOSE            MEDICATIONS  (STANDING):  aspirin enteric coated 81milliGRAM(s) Oral daily  citalopram 10milliGRAM(s) Oral daily  pantoprazole    Tablet 40milliGRAM(s) Oral before breakfast  gabapentin 300milliGRAM(s) Oral daily  risperiDONE   Tablet 0.5milliGRAM(s) Oral <User Schedule>  risperiDONE   Tablet 1milliGRAM(s) Oral at bedtime  simvastatin 20milliGRAM(s) Oral at bedtime  sodium chloride 0.9% lock flush 3milliLiter(s) IV Push every 8 hours  heparin  Injectable 5000Unit(s) SubCutaneous every 8 hours  nystatin Powder 1Application(s) Topical three times a day  lisinopril 2.5milliGRAM(s) Oral daily  lidocaine   Patch 2Patch Transdermal daily  furosemide    Tablet 20milliGRAM(s) Oral every 48 hours  metoprolol 25milliGRAM(s) Oral two times a day    MEDICATIONS  (PRN):  senna 2Tablet(s) Oral at bedtime PRN Constipation  acetaminophen   Tablet. 650milliGRAM(s) Oral every 6 hours PRN mild and moderate pain  haloperidol    Injectable 0.5milliGRAM(s) IV Push every 6 hours PRN Agitation  lidocaine/prilocaine Cream 1Application(s) Topical every 3 hours PRN Topical moderate to severe pain      REVIEW OF SYSTEMS:  CONSTITUTIONAL: No fever, weight loss, or fatigue  EYES: No eye pain, visual disturbances, or discharge  ENMT:  No difficulty hearing, tinnitus, vertigo; No sinus or throat pain  NECK: No pain or stiffness  RESPIRATORY: No cough, wheezing, chills or hemoptysis; No shortness of breath  CARDIOVASCULAR: No chest pain, palpitations, dizziness, or leg swelling  GASTROINTESTINAL: No abdominal or epigastric pain. No nausea, vomiting, or hematemesis; No diarrhea or constipation. No melena or hematochezia.  GENITOURINARY: No dysuria, frequency, hematuria, or incontinence  NEUROLOGICAL: No headaches, memory loss, loss of strength, numbness, or tremors  SKIN: No itching, burning, rashes, or lesions   LYMPH NODES: No enlarged glands  ENDOCRINE: No heat or cold intolerance; No hair loss  MUSCULOSKELETAL: No joint pain or swelling; No muscle, back, or extremity pain  PSYCHIATRIC: No depression, anxiety, mood swings, or difficulty sleeping  HEME/LYMPH: No easy bruising, or bleeding gums  ALLERY AND IMMUNOLOGIC: No hives or eczema    RADIOLOGY & ADDITIONAL TESTS:    Imaging Personally Reviewed:  [ ] YES  [ ] NO    Consultant(s) Notes Reviewed:  [ ] YES  [ ] NO    PHYSICAL EXAM:  GENERAL: NAD, well-groomed, well-developed  HEAD:  Atraumatic, Normocephalic  EYES: EOMI, PERRLA, conjunctiva and sclera clear  ENMT: No tonsillar erythema, exudates, or enlargement; Moist mucous membranes, Good dentition, No lesions  NECK: Supple, No JVD, Normal thyroid  NERVOUS SYSTEM:  Alert & Oriented X3, Good concentration; Motor Strength 5/5 B/L upper and lower extremities; DTRs 2+ intact and symmetric  CHEST/LUNG: Clear to percussion bilaterally; No rales, rhonchi, wheezing, or rubs  HEART: Regular rate and rhythm; No murmurs, rubs, or gallops  ABDOMEN: Soft, Nontender, Nondistended; Bowel sounds present  EXTREMITIES:  2+ Peripheral Pulses, No clubbing, cyanosis, or edema  LYMPH: No lymphadenopathy noted  SKIN: No rashes or lesions    Care Discussed with Consultants/Other Providers [* ] YES  [ ] NO

## 2017-05-30 NOTE — PROGRESS NOTE ADULT - ASSESSMENT
81 yo male with CVA, Dementia, CAD, CABG, CMP, AS, Systolic HF, HTN, HLD and DM admitted  worsening dementia / JULIO /  Hyperkalemia.    -CV status stable   -chronic systolic HF: cont bb/ace, will uptitrate ace/bb as toelrates   -lasix as ordered  -AICD deferred due to advance dementia  -monitor BMP

## 2017-05-30 NOTE — PROGRESS NOTE ADULT - PROBLEM SELECTOR PLAN 2
neurology following
neurology following/appreciated
BP controlled. Continue with current medications and low sodium diet. Monitor BP.
BP improved. Continue with current medications and low sodium diet. Can titrate metoprolol as needed. Monitor BP. Patient remains off of ACE-I.
BP labile as increases when patient agitated. Can restart low dose ACE-I. Can titrate metoprolol as needed as per cardiology. Monitor BP.
ammonia levels wnl; LFTs wnl  neurology following
BP elevated likely secondary to agitation. Recheck BP later when patient not agitated. Keep off ACE-I for now. Change diet to low sodium. Monitor BP.

## 2017-05-30 NOTE — PROGRESS NOTE ADULT - PROBLEM SELECTOR PLAN 1
am labs  ppi qd  senna/colace; no overt GIB  aspiration precautions  dysphagia diet w/HOB elevated  dc per primary team
Likely hemodynamically mediated secondary to diuretic ACE-I therapy given hyaline casts on UA with resolution on IVF. JULIO now resolved. Monitor renal function. Avoid nephrotoxins.
Likely hemodynamically mediated secondary to diuretic ACE-I therapy given hyaline casts on UA with resolution on IVF. Monitor renal function. Avoid nephrotoxins.
am labs  ppi qd  senna/colace; no overt GIB  aspiration precautions  dysphagia diet w/HOB elevated  dc per primary team
am labs  ppi qd  senna/colace; no over GIB  aspiration precautions  dysphagia diet w/HOB elevated
Likely hemodynamically mediated secondary to diuretic ACE-I therapy given hyaline casts on UA with resolution on IVF. Monitor renal function. Avoid nephrotoxins.

## 2017-05-30 NOTE — PROGRESS NOTE ADULT - PROBLEM SELECTOR PROBLEM 2
Benign hypertension
Confusion
Benign hypertension
Confusion

## 2017-05-30 NOTE — PROGRESS NOTE ADULT - ATTENDING COMMENTS
[facility] NEPHROLOGY  Eliel Edmond M.D.  Praful Dominguez D.O.  Debbie Mcqueen M.D.  Shelbi Lucas, MSN, ANP-C    Telephone: [phone]  Facsimile: [phone]    [address] [place] [address]
pt seen and examined  agree with above np note  cv stable  bb/ace  no icd  dvt ppx
agree with above np note  cv stable  lasix as ordered   b/ace for cmp  icd deferred due to advanced dementia  monitor bmp  asa
pt seen and examined  agree with np note above  cv stable  jhony resolved  severe icmp/as  med tx  restart bb  ace on hold sec to hyperkalemia  defer icd for now in light of advanced dementia
[facility] NEPHROLOGY  Eliel Edmond M.D.  Praful Dominguez D.O.  Debbie Mcqueen M.D.  Shelbi Lucas, MSN, ANP-C    Telephone: [phone]  Facsimile: [phone]    [address] [place] [address]
[facility] NEPHROLOGY  Eliel Edmond M.D.  Praful Dominguez D.O.  Debbie Mcqueen M.D.  Shelbi Lucas, MSN, ANP-C    Telephone: [phone]  Facsimile: [phone]    [address] [place] [address]
[facility] NEPHROLOGY  Eliel Edmond M.D.  Praful Dominguez D.O.  Debbie Mcqueen M.D.  Shelbi Lucas, MSN, ANP-C    Telephone: [phone]  Facsimile: [phone]    [address] [place] [address]
[facility] NEPHROLOGY  Eliel Edmond M.D.  Praful Dominguez D.O.  Debbie Mcqueen M.D.  Shelbi Lucas, MSN, ANP-C    Telephone: [phone]  Facsimile: [phone]    [address] [place] [address]
[facility] NEPHROLOGY  Eliel Edmond M.D.  Praful Dominguez D.O.  Debbie Mcqueen M.D.  hSelbi Lucas, MSN, ANP-C    Telephone: [phone]  Facsimile: [phone]    [address] [place] [address]
[facility] NEPHROLOGY  Eliel Edmond M.D.  Praful Dominguez D.O.  Debbie Mcqueen M.D.  Shelbi Lucas, MSN, ANP-C    Telephone: [phone]  Facsimile: [phone]    [address] [place] [address]
[facility] NEPHROLOGY  Eliel Edmond M.D.  Praful Dominguez D.O.  Debbie Mcqueen M.D.  Shelbi Lucas, MSN, ANP-C    Telephone: [phone]  Facsimile: [phone]    [address] [place] [address]
[facility] NEPHROLOGY  Eliel Edmond M.D.  Praful Dominguez D.O.  Debbie Mcqueen M.D.  Shelbi Lucas, MSN, ANP-C    Telephone: [phone]  Facsimile: [phone]    [address] [place] [address]

## 2017-05-30 NOTE — PROGRESS NOTE ADULT - PROBLEM SELECTOR PLAN 5
Mild and secondary to free water deficit (2.8L). Start D5W @ 50 ml/hour. Monitor Na.
Mild and secondary to free water deficit s/p improvement after D5W. Monitor Na. Encourage PO intake.
Mild and secondary to free water deficit s/p improvement after D5W. Monitor Na. Encourage PO intake.
Secondary to free water deficit s/p improvement after D5W. Monitor Na. Encourage PO intake.    Will sign off. Please call with any questions.
secondary to free water deficit s/p improvement after D5W. Monitor Na. Encourage PO intake.
secondary to free water deficit s/p improvement after D5W. Monitor Na. Encourage PO intake.
Mild and secondary to free water deficit. Patient advised to increase oral fluid intake to 1L/day. Monitor Na.

## 2017-05-30 NOTE — PROGRESS NOTE ADULT - ASSESSMENT
81 yo male PMHx of CVA with R sided paralysis, Dementia, CAD, CABG, Systolic HF, HTN, HLD and DM p/w worsening confusion, slurred speech and R shoulder pain x 2 weeks, found in JULIO and Hyperkalemia.    Problem/Plan - 1:  ·  Problem: Hyperkalemia.  Plan: tele monitor  resolved  s/p IV fluids hydration  recheck BMP      Problem/Plan - 2:  ·  Problem: JULIO (acute kidney injury).  Plan: Monitor electrolytes  Trend BUN/Cr  Hold Nephrotoxic meds    Problem/Plan - 3:  ·  Problem: Confusion.  Plan: tele monitor  Aspiration and Falls precautions  HOB elevated 30 degrees   neuro consult  ID consult to ro sepsis    Problem/Plan - 4:  ·  Problem: GERD (Gastroesophageal Reflux Disease).  Plan: started on Protonix 40mg po daily.     Problem/Plan - 5:  ·  Problem: HTN (hypertension)  monitor on current meds

## 2017-05-30 NOTE — PROGRESS NOTE ADULT - PROBLEM SELECTOR PLAN 3
Continue with lasix 20 mg every other day. Patient currently appears euvolemic. Monitor UO.
Lasix decreased by cardiology to 20 mg every other day. Patient currently appears euvolemic. Monitor UO.
Lasix restarted as per cardiology on 5/23/17. Patient currently appears dry. Will consider decreasing lasix to every other day. Monitor UO.
Lasix restarted as per cardiology on 5/23/17. Patient does not appear volume overloaded at this time. Monitor UO.
Patient does not appear volume overloaded at this time. Continue to hold lasix for now. Monitor UO.
Patient does not appear volume overloaded at this time. Continue to hold lasix for now. Monitor UO.

## 2017-05-31 VITALS
OXYGEN SATURATION: 98 % | HEART RATE: 72 BPM | DIASTOLIC BLOOD PRESSURE: 66 MMHG | RESPIRATION RATE: 17 BRPM | TEMPERATURE: 99 F | SYSTOLIC BLOOD PRESSURE: 143 MMHG

## 2017-05-31 RX ORDER — METOPROLOL TARTRATE 50 MG
0.5 TABLET ORAL
Qty: 0 | Refills: 0 | COMMUNITY

## 2017-05-31 RX ORDER — METOPROLOL TARTRATE 50 MG
1 TABLET ORAL
Qty: 40 | Refills: 0 | OUTPATIENT
Start: 2017-05-31 | End: 2017-06-20

## 2017-05-31 RX ADMIN — Medication 25 MILLIGRAM(S): at 05:48

## 2017-05-31 RX ADMIN — SODIUM CHLORIDE 3 MILLILITER(S): 9 INJECTION INTRAMUSCULAR; INTRAVENOUS; SUBCUTANEOUS at 05:49

## 2017-05-31 RX ADMIN — NYSTATIN CREAM 1 APPLICATION(S): 100000 CREAM TOPICAL at 05:48

## 2017-05-31 RX ADMIN — LIDOCAINE 2 PATCH: 4 CREAM TOPICAL at 00:16

## 2017-05-31 RX ADMIN — PANTOPRAZOLE SODIUM 40 MILLIGRAM(S): 20 TABLET, DELAYED RELEASE ORAL at 05:49

## 2017-05-31 RX ADMIN — LISINOPRIL 2.5 MILLIGRAM(S): 2.5 TABLET ORAL at 05:48

## 2017-05-31 RX ADMIN — HEPARIN SODIUM 5000 UNIT(S): 5000 INJECTION INTRAVENOUS; SUBCUTANEOUS at 05:48

## 2017-05-31 NOTE — PROGRESS NOTE ADULT - SUBJECTIVE AND OBJECTIVE BOX
Patient is a 82y old  Male who presents with a chief complaint of worsening confusion (25 May 2017 06:58)      INTERVAL HPI/OVERNIGHT EVENTS:  T(C): 37, Max: 37 (05-31 @ 09:16)  HR: 72 (64 - 81)  BP: 143/66 (133/58 - 153/66)  RR: 17 (16 - 17)  SpO2: 98% (97% - 99%)  Wt(kg): --  I&O's Summary      LABS:                        12.0   6.59  )-----------( 182      ( 30 May 2017 05:30 )             36.4     05-30    142  |  101  |  30<H>  ----------------------------<  201<H>  3.8   |  25  |  0.88    Ca    9.7      30 May 2017 05:30  Mg     2.0     05-30          CAPILLARY BLOOD GLUCOSE            MEDICATIONS  (STANDING):  aspirin enteric coated 81milliGRAM(s) Oral daily  citalopram 10milliGRAM(s) Oral daily  pantoprazole    Tablet 40milliGRAM(s) Oral before breakfast  gabapentin 300milliGRAM(s) Oral daily  risperiDONE   Tablet 0.5milliGRAM(s) Oral <User Schedule>  risperiDONE   Tablet 1milliGRAM(s) Oral at bedtime  simvastatin 20milliGRAM(s) Oral at bedtime  sodium chloride 0.9% lock flush 3milliLiter(s) IV Push every 8 hours  heparin  Injectable 5000Unit(s) SubCutaneous every 8 hours  nystatin Powder 1Application(s) Topical three times a day  lisinopril 2.5milliGRAM(s) Oral daily  lidocaine   Patch 2Patch Transdermal daily  furosemide    Tablet 20milliGRAM(s) Oral every 48 hours  metoprolol 25milliGRAM(s) Oral two times a day    MEDICATIONS  (PRN):  senna 2Tablet(s) Oral at bedtime PRN Constipation  acetaminophen   Tablet. 650milliGRAM(s) Oral every 6 hours PRN mild and moderate pain  haloperidol    Injectable 0.5milliGRAM(s) IV Push every 6 hours PRN Agitation  lidocaine/prilocaine Cream 1Application(s) Topical every 3 hours PRN Topical moderate to severe pain      REVIEW OF SYSTEMS:  CONSTITUTIONAL: No fever, weight loss, or fatigue  EYES: No eye pain, visual disturbances, or discharge  ENMT:  No difficulty hearing, tinnitus, vertigo; No sinus or throat pain  NECK: No pain or stiffness  RESPIRATORY: No cough, wheezing, chills or hemoptysis; No shortness of breath  CARDIOVASCULAR: No chest pain, palpitations, dizziness, or leg swelling  GASTROINTESTINAL: No abdominal or epigastric pain. No nausea, vomiting, or hematemesis; No diarrhea or constipation. No melena or hematochezia.  GENITOURINARY: No dysuria, frequency, hematuria, or incontinence  NEUROLOGICAL: No headaches, memory loss, loss of strength, numbness, or tremors  SKIN: No itching, burning, rashes, or lesions   LYMPH NODES: No enlarged glands  ENDOCRINE: No heat or cold intolerance; No hair loss  MUSCULOSKELETAL: No joint pain or swelling; No muscle, back, or extremity pain  PSYCHIATRIC: No depression, anxiety, mood swings, or difficulty sleeping  HEME/LYMPH: No easy bruising, or bleeding gums  ALLERY AND IMMUNOLOGIC: No hives or eczema    RADIOLOGY & ADDITIONAL TESTS:    Imaging Personally Reviewed:  [ ] YES  [ ] NO    Consultant(s) Notes Reviewed:  [ ] YES  [ ] NO    PHYSICAL EXAM:  GENERAL: NAD, well-groomed, well-developed  HEAD:  Atraumatic, Normocephalic  EYES: EOMI, PERRLA, conjunctiva and sclera clear  ENMT: No tonsillar erythema, exudates, or enlargement; Moist mucous membranes, Good dentition, No lesions  NECK: Supple, No JVD, Normal thyroid  NERVOUS SYSTEM:  Alert & Oriented X3, Good concentration; Motor Strength 5/5 B/L upper and lower extremities; DTRs 2+ intact and symmetric  CHEST/LUNG: Clear to percussion bilaterally; No rales, rhonchi, wheezing, or rubs  HEART: Regular rate and rhythm; No murmurs, rubs, or gallops  ABDOMEN: Soft, Nontender, Nondistended; Bowel sounds present  EXTREMITIES:  2+ Peripheral Pulses, No clubbing, cyanosis, or edema  LYMPH: No lymphadenopathy noted  SKIN: No rashes or lesions    Care Discussed with Consultants/Other Providers [* ] YES  [ ] NO

## 2017-05-31 NOTE — PROGRESS NOTE ADULT - PROVIDER SPECIALTY LIST ADULT
Cardiology
Gastroenterology
Hospitalist
Infectious Disease
Nephrology
Neurology
Nephrology

## 2017-07-08 ENCOUNTER — INPATIENT (INPATIENT)
Facility: HOSPITAL | Age: 82
LOS: 4 days | Discharge: HOME | End: 2017-07-13
Attending: INTERNAL MEDICINE | Admitting: INTERNAL MEDICINE
Payer: MEDICARE

## 2017-07-08 VITALS
RESPIRATION RATE: 18 BRPM | DIASTOLIC BLOOD PRESSURE: 63 MMHG | TEMPERATURE: 98 F | SYSTOLIC BLOOD PRESSURE: 140 MMHG | OXYGEN SATURATION: 99 % | HEART RATE: 60 BPM

## 2017-07-08 DIAGNOSIS — Z89.611 ACQUIRED ABSENCE OF RIGHT LEG ABOVE KNEE: Chronic | ICD-10-CM

## 2017-07-08 NOTE — ED ADULT TRIAGE NOTE - CHIEF COMPLAINT QUOTE
Pt brought in by EMS from home, s/p fall. As per EMS pt has was reclining back from chair and hit his head against the wall. Home health aide walked in and saw patient lying on the floor. Pt has hx of dementia, psychosis. Pt unsure of LOC. C-collar in placed. Pt brought in by EMS from home, s/p fall. As per EMS pt was reclining back from chair and hit his head against the wall. Home health aide walked in and saw patient lying on the floor. Pt has hx of dementia, psychosis. Pt unsure of LOC. C-collar in placed.

## 2017-07-09 ENCOUNTER — TRANSCRIPTION ENCOUNTER (OUTPATIENT)
Age: 82
End: 2017-07-09

## 2017-07-09 DIAGNOSIS — Z29.9 ENCOUNTER FOR PROPHYLACTIC MEASURES, UNSPECIFIED: ICD-10-CM

## 2017-07-09 DIAGNOSIS — I11.0 HYPERTENSIVE HEART DISEASE WITH HEART FAILURE: ICD-10-CM

## 2017-07-09 DIAGNOSIS — K21.9 GASTRO-ESOPHAGEAL REFLUX DISEASE WITHOUT ESOPHAGITIS: ICD-10-CM
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
## 2017-07-09 DIAGNOSIS — I10 ESSENTIAL (PRIMARY) HYPERTENSION: ICD-10-CM

## 2017-07-09 DIAGNOSIS — R79.89 OTHER SPECIFIED ABNORMAL FINDINGS OF BLOOD CHEMISTRY: ICD-10-CM

## 2017-07-09 DIAGNOSIS — I50.9 HEART FAILURE, UNSPECIFIED: ICD-10-CM

## 2017-07-09 DIAGNOSIS — E78.5 HYPERLIPIDEMIA, UNSPECIFIED: ICD-10-CM

## 2017-07-09 DIAGNOSIS — I63.9 CEREBRAL INFARCTION, UNSPECIFIED: ICD-10-CM

## 2017-07-09 DIAGNOSIS — I50.23 ACUTE ON CHRONIC SYSTOLIC (CONGESTIVE) HEART FAILURE: ICD-10-CM

## 2017-07-09 DIAGNOSIS — I35.0 NONRHEUMATIC AORTIC (VALVE) STENOSIS: ICD-10-CM

## 2017-07-09 DIAGNOSIS — R73.03 PREDIABETES: ICD-10-CM

## 2017-07-09 DIAGNOSIS — Z98.890 OTHER SPECIFIED POSTPROCEDURAL STATES: Chronic | ICD-10-CM

## 2017-07-09 DIAGNOSIS — I25.10 ATHEROSCLEROTIC HEART DISEASE OF NATIVE CORONARY ARTERY WITHOUT ANGINA PECTORIS: ICD-10-CM

## 2017-07-09 DIAGNOSIS — J18.9 PNEUMONIA, UNSPECIFIED ORGANISM: ICD-10-CM

## 2017-07-09 LAB
ALBUMIN SERPL ELPH-MCNC: 3.6 G/DL — SIGNIFICANT CHANGE UP (ref 3.3–5)
ALP SERPL-CCNC: 67 U/L — SIGNIFICANT CHANGE UP (ref 40–120)
ALT FLD-CCNC: 19 U/L — SIGNIFICANT CHANGE UP (ref 4–41)
AST SERPL-CCNC: 23 U/L — SIGNIFICANT CHANGE UP (ref 4–40)
BASE EXCESS BLDV CALC-SCNC: 2.9 MMOL/L — SIGNIFICANT CHANGE UP
BASOPHILS # BLD AUTO: 0.05 K/UL — SIGNIFICANT CHANGE UP (ref 0–0.2)
BASOPHILS NFR BLD AUTO: 0.6 % — SIGNIFICANT CHANGE UP (ref 0–2)
BILIRUB SERPL-MCNC: 0.6 MG/DL — SIGNIFICANT CHANGE UP (ref 0.2–1.2)
BLOOD GAS VENOUS - CREATININE: 0.75 MG/DL — SIGNIFICANT CHANGE UP (ref 0.5–1.3)
BUN SERPL-MCNC: 19 MG/DL — SIGNIFICANT CHANGE UP (ref 7–23)
CALCIUM SERPL-MCNC: 8.9 MG/DL — SIGNIFICANT CHANGE UP (ref 8.4–10.5)
CHLORIDE BLDV-SCNC: 107 MMOL/L — SIGNIFICANT CHANGE UP (ref 96–108)
CHLORIDE SERPL-SCNC: 104 MMOL/L — SIGNIFICANT CHANGE UP (ref 98–107)
CK MB BLD-MCNC: 2.08 NG/ML — SIGNIFICANT CHANGE UP (ref 1–6.6)
CK MB BLD-MCNC: 2.2 NG/ML — SIGNIFICANT CHANGE UP (ref 1–6.6)
CK SERPL-CCNC: 50 U/L — SIGNIFICANT CHANGE UP (ref 30–200)
CK SERPL-CCNC: 75 U/L — SIGNIFICANT CHANGE UP (ref 30–200)
CO2 SERPL-SCNC: 22 MMOL/L — SIGNIFICANT CHANGE UP (ref 22–31)
CREAT SERPL-MCNC: 0.75 MG/DL — SIGNIFICANT CHANGE UP (ref 0.5–1.3)
EOSINOPHIL # BLD AUTO: 0.2 K/UL — SIGNIFICANT CHANGE UP (ref 0–0.5)
EOSINOPHIL NFR BLD AUTO: 2.4 % — SIGNIFICANT CHANGE UP (ref 0–6)
GAS PNL BLDV: 139 MMOL/L — SIGNIFICANT CHANGE UP (ref 136–146)
GLUCOSE BLDV-MCNC: 206 — HIGH (ref 70–99)
GLUCOSE SERPL-MCNC: 209 MG/DL — HIGH (ref 70–99)
HCO3 BLDV-SCNC: 26 MMOL/L — SIGNIFICANT CHANGE UP (ref 20–27)
HCT VFR BLD CALC: 33.9 % — LOW (ref 39–50)
HCT VFR BLDV CALC: 34.9 % — LOW (ref 39–51)
HGB BLD-MCNC: 10.9 G/DL — LOW (ref 13–17)
HGB BLDV-MCNC: 11.3 G/DL — LOW (ref 13–17)
IMM GRANULOCYTES # BLD AUTO: 0.04 # — SIGNIFICANT CHANGE UP
IMM GRANULOCYTES NFR BLD AUTO: 0.5 % — SIGNIFICANT CHANGE UP (ref 0–1.5)
LACTATE BLDV-MCNC: 1.4 MMOL/L — SIGNIFICANT CHANGE UP (ref 0.5–2)
LYMPHOCYTES # BLD AUTO: 1.29 K/UL — SIGNIFICANT CHANGE UP (ref 1–3.3)
LYMPHOCYTES # BLD AUTO: 15.4 % — SIGNIFICANT CHANGE UP (ref 13–44)
MCHC RBC-ENTMCNC: 31.6 PG — SIGNIFICANT CHANGE UP (ref 27–34)
MCHC RBC-ENTMCNC: 32.2 % — SIGNIFICANT CHANGE UP (ref 32–36)
MCV RBC AUTO: 98.3 FL — SIGNIFICANT CHANGE UP (ref 80–100)
MONOCYTES # BLD AUTO: 1 K/UL — HIGH (ref 0–0.9)
MONOCYTES NFR BLD AUTO: 11.9 % — SIGNIFICANT CHANGE UP (ref 2–14)
NEUTROPHILS # BLD AUTO: 5.82 K/UL — SIGNIFICANT CHANGE UP (ref 1.8–7.4)
NEUTROPHILS NFR BLD AUTO: 69.2 % — SIGNIFICANT CHANGE UP (ref 43–77)
NRBC # FLD: 0 — SIGNIFICANT CHANGE UP
NT-PROBNP SERPL-SCNC: SIGNIFICANT CHANGE UP PG/ML
PCO2 BLDV: 49 MMHG — SIGNIFICANT CHANGE UP (ref 41–51)
PH BLDV: 7.37 PH — SIGNIFICANT CHANGE UP (ref 7.32–7.43)
PLATELET # BLD AUTO: 141 K/UL — LOW (ref 150–400)
PMV BLD: 11.9 FL — SIGNIFICANT CHANGE UP (ref 7–13)
PO2 BLDV: 50 MMHG — HIGH (ref 35–40)
POTASSIUM BLDV-SCNC: 3.9 MMOL/L — SIGNIFICANT CHANGE UP (ref 3.4–4.5)
POTASSIUM SERPL-MCNC: 4.1 MMOL/L — SIGNIFICANT CHANGE UP (ref 3.5–5.3)
POTASSIUM SERPL-SCNC: 4.1 MMOL/L — SIGNIFICANT CHANGE UP (ref 3.5–5.3)
PROT SERPL-MCNC: 6.1 G/DL — SIGNIFICANT CHANGE UP (ref 6–8.3)
RBC # BLD: 3.45 M/UL — LOW (ref 4.2–5.8)
RBC # FLD: 15.4 % — HIGH (ref 10.3–14.5)
SAO2 % BLDV: 81.3 % — SIGNIFICANT CHANGE UP (ref 60–85)
SODIUM SERPL-SCNC: 144 MMOL/L — SIGNIFICANT CHANGE UP (ref 135–145)
TROPONIN T SERPL-MCNC: < 0.06 NG/ML — SIGNIFICANT CHANGE UP (ref 0–0.06)
TROPONIN T SERPL-MCNC: < 0.06 NG/ML — SIGNIFICANT CHANGE UP (ref 0–0.06)
WBC # BLD: 8.4 K/UL — SIGNIFICANT CHANGE UP (ref 3.8–10.5)
WBC # FLD AUTO: 8.4 K/UL — SIGNIFICANT CHANGE UP (ref 3.8–10.5)

## 2017-07-09 PROCEDURE — 70450 CT HEAD/BRAIN W/O DYE: CPT | Mod: 26

## 2017-07-09 PROCEDURE — 93010 ELECTROCARDIOGRAM REPORT: CPT

## 2017-07-09 PROCEDURE — 71010: CPT | Mod: 26

## 2017-07-09 PROCEDURE — 72125 CT NECK SPINE W/O DYE: CPT | Mod: 26

## 2017-07-09 RX ORDER — PANTOPRAZOLE SODIUM 20 MG/1
40 TABLET, DELAYED RELEASE ORAL
Qty: 0 | Refills: 0 | Status: DISCONTINUED | OUTPATIENT
Start: 2017-07-09 | End: 2017-07-13

## 2017-07-09 RX ORDER — HALOPERIDOL DECANOATE 100 MG/ML
2 INJECTION INTRAMUSCULAR ONCE
Qty: 0 | Refills: 0 | Status: COMPLETED | OUTPATIENT
Start: 2017-07-09 | End: 2017-07-09

## 2017-07-09 RX ORDER — DEXTROSE 50 % IN WATER 50 %
1 SYRINGE (ML) INTRAVENOUS ONCE
Qty: 0 | Refills: 0 | Status: DISCONTINUED | OUTPATIENT
Start: 2017-07-09 | End: 2017-07-13

## 2017-07-09 RX ORDER — DEXTROSE 50 % IN WATER 50 %
25 SYRINGE (ML) INTRAVENOUS ONCE
Qty: 0 | Refills: 0 | Status: DISCONTINUED | OUTPATIENT
Start: 2017-07-09 | End: 2017-07-13

## 2017-07-09 RX ORDER — LIDOCAINE 4 G/100G
2 CREAM TOPICAL DAILY
Qty: 0 | Refills: 0 | Status: DISCONTINUED | OUTPATIENT
Start: 2017-07-09 | End: 2017-07-13

## 2017-07-09 RX ORDER — VANCOMYCIN HCL 1 G
1000 VIAL (EA) INTRAVENOUS ONCE
Qty: 0 | Refills: 0 | Status: DISCONTINUED | OUTPATIENT
Start: 2017-07-09 | End: 2017-07-09

## 2017-07-09 RX ORDER — ASPIRIN/CALCIUM CARB/MAGNESIUM 324 MG
81 TABLET ORAL DAILY
Qty: 0 | Refills: 0 | Status: DISCONTINUED | OUTPATIENT
Start: 2017-07-09 | End: 2017-07-13

## 2017-07-09 RX ORDER — INSULIN LISPRO 100/ML
VIAL (ML) SUBCUTANEOUS
Qty: 0 | Refills: 0 | Status: DISCONTINUED | OUTPATIENT
Start: 2017-07-09 | End: 2017-07-13

## 2017-07-09 RX ORDER — SIMVASTATIN 20 MG/1
20 TABLET, FILM COATED ORAL AT BEDTIME
Qty: 0 | Refills: 0 | Status: DISCONTINUED | OUTPATIENT
Start: 2017-07-09 | End: 2017-07-13

## 2017-07-09 RX ORDER — GLUCAGON INJECTION, SOLUTION 0.5 MG/.1ML
1 INJECTION, SOLUTION SUBCUTANEOUS ONCE
Qty: 0 | Refills: 0 | Status: DISCONTINUED | OUTPATIENT
Start: 2017-07-09 | End: 2017-07-13

## 2017-07-09 RX ORDER — SODIUM CHLORIDE 9 MG/ML
1000 INJECTION INTRAMUSCULAR; INTRAVENOUS; SUBCUTANEOUS ONCE
Qty: 0 | Refills: 0 | Status: COMPLETED | OUTPATIENT
Start: 2017-07-09 | End: 2017-07-09

## 2017-07-09 RX ORDER — DEXTROSE 50 % IN WATER 50 %
12.5 SYRINGE (ML) INTRAVENOUS ONCE
Qty: 0 | Refills: 0 | Status: DISCONTINUED | OUTPATIENT
Start: 2017-07-09 | End: 2017-07-13

## 2017-07-09 RX ORDER — VANCOMYCIN HCL 1 G
1000 VIAL (EA) INTRAVENOUS ONCE
Qty: 0 | Refills: 0 | Status: COMPLETED | OUTPATIENT
Start: 2017-07-09 | End: 2017-07-09

## 2017-07-09 RX ORDER — CITALOPRAM 10 MG/1
10 TABLET, FILM COATED ORAL DAILY
Qty: 0 | Refills: 0 | Status: DISCONTINUED | OUTPATIENT
Start: 2017-07-09 | End: 2017-07-13

## 2017-07-09 RX ORDER — RISPERIDONE 4 MG/1
1 TABLET ORAL AT BEDTIME
Qty: 0 | Refills: 0 | Status: DISCONTINUED | OUTPATIENT
Start: 2017-07-09 | End: 2017-07-13

## 2017-07-09 RX ORDER — GABAPENTIN 400 MG/1
300 CAPSULE ORAL DAILY
Qty: 0 | Refills: 0 | Status: DISCONTINUED | OUTPATIENT
Start: 2017-07-09 | End: 2017-07-13

## 2017-07-09 RX ORDER — SENNA PLUS 8.6 MG/1
2 TABLET ORAL AT BEDTIME
Qty: 0 | Refills: 0 | Status: DISCONTINUED | OUTPATIENT
Start: 2017-07-09 | End: 2017-07-13

## 2017-07-09 RX ORDER — ENOXAPARIN SODIUM 100 MG/ML
40 INJECTION SUBCUTANEOUS EVERY 24 HOURS
Qty: 0 | Refills: 0 | Status: DISCONTINUED | OUTPATIENT
Start: 2017-07-09 | End: 2017-07-13

## 2017-07-09 RX ORDER — PIPERACILLIN AND TAZOBACTAM 4; .5 G/20ML; G/20ML
3.38 INJECTION, POWDER, LYOPHILIZED, FOR SOLUTION INTRAVENOUS ONCE
Qty: 0 | Refills: 0 | Status: COMPLETED | OUTPATIENT
Start: 2017-07-09 | End: 2017-07-09

## 2017-07-09 RX ORDER — LIDOCAINE AND PRILOCAINE CREAM 25; 25 MG/G; MG/G
1 CREAM TOPICAL
Qty: 0 | Refills: 0 | Status: DISCONTINUED | OUTPATIENT
Start: 2017-07-09 | End: 2017-07-13

## 2017-07-09 RX ORDER — METOPROLOL TARTRATE 50 MG
25 TABLET ORAL
Qty: 0 | Refills: 0 | Status: DISCONTINUED | OUTPATIENT
Start: 2017-07-09 | End: 2017-07-13

## 2017-07-09 RX ORDER — INSULIN LISPRO 100/ML
VIAL (ML) SUBCUTANEOUS AT BEDTIME
Qty: 0 | Refills: 0 | Status: DISCONTINUED | OUTPATIENT
Start: 2017-07-09 | End: 2017-07-13

## 2017-07-09 RX ORDER — ACETAMINOPHEN 500 MG
650 TABLET ORAL ONCE
Qty: 0 | Refills: 0 | Status: COMPLETED | OUTPATIENT
Start: 2017-07-09 | End: 2017-07-09

## 2017-07-09 RX ORDER — NYSTATIN CREAM 100000 [USP'U]/G
1 CREAM TOPICAL THREE TIMES A DAY
Qty: 0 | Refills: 0 | Status: DISCONTINUED | OUTPATIENT
Start: 2017-07-09 | End: 2017-07-13

## 2017-07-09 RX ORDER — FAMOTIDINE 10 MG/ML
20 INJECTION INTRAVENOUS
Qty: 0 | Refills: 0 | Status: DISCONTINUED | OUTPATIENT
Start: 2017-07-09 | End: 2017-07-13

## 2017-07-09 RX ORDER — RISPERIDONE 4 MG/1
0.5 TABLET ORAL DAILY
Qty: 0 | Refills: 0 | Status: DISCONTINUED | OUTPATIENT
Start: 2017-07-09 | End: 2017-07-13

## 2017-07-09 RX ORDER — LISINOPRIL 2.5 MG/1
2.5 TABLET ORAL DAILY
Qty: 0 | Refills: 0 | Status: DISCONTINUED | OUTPATIENT
Start: 2017-07-09 | End: 2017-07-13

## 2017-07-09 RX ORDER — SODIUM CHLORIDE 9 MG/ML
1000 INJECTION, SOLUTION INTRAVENOUS
Qty: 0 | Refills: 0 | Status: DISCONTINUED | OUTPATIENT
Start: 2017-07-09 | End: 2017-07-13

## 2017-07-09 RX ORDER — VANCOMYCIN HCL 1 G
1000 VIAL (EA) INTRAVENOUS EVERY 12 HOURS
Qty: 0 | Refills: 0 | Status: DISCONTINUED | OUTPATIENT
Start: 2017-07-09 | End: 2017-07-09

## 2017-07-09 RX ADMIN — LISINOPRIL 2.5 MILLIGRAM(S): 2.5 TABLET ORAL at 12:46

## 2017-07-09 RX ADMIN — NYSTATIN CREAM 1 APPLICATION(S): 100000 CREAM TOPICAL at 20:58

## 2017-07-09 RX ADMIN — Medication 650 MILLIGRAM(S): at 11:10

## 2017-07-09 RX ADMIN — CITALOPRAM 10 MILLIGRAM(S): 10 TABLET, FILM COATED ORAL at 12:37

## 2017-07-09 RX ADMIN — SIMVASTATIN 20 MILLIGRAM(S): 20 TABLET, FILM COATED ORAL at 20:58

## 2017-07-09 RX ADMIN — LIDOCAINE 2 PATCH: 4 CREAM TOPICAL at 12:38

## 2017-07-09 RX ADMIN — NYSTATIN CREAM 1 APPLICATION(S): 100000 CREAM TOPICAL at 13:28

## 2017-07-09 RX ADMIN — FAMOTIDINE 20 MILLIGRAM(S): 10 INJECTION INTRAVENOUS at 17:48

## 2017-07-09 RX ADMIN — RISPERIDONE 0.5 MILLIGRAM(S): 4 TABLET ORAL at 12:38

## 2017-07-09 RX ADMIN — SODIUM CHLORIDE 1000 MILLILITER(S): 9 INJECTION INTRAMUSCULAR; INTRAVENOUS; SUBCUTANEOUS at 05:38

## 2017-07-09 RX ADMIN — Medication 25 MILLIGRAM(S): at 17:48

## 2017-07-09 RX ADMIN — Medication 2: at 12:36

## 2017-07-09 RX ADMIN — RISPERIDONE 1 MILLIGRAM(S): 4 TABLET ORAL at 20:57

## 2017-07-09 RX ADMIN — HALOPERIDOL DECANOATE 2 MILLIGRAM(S): 100 INJECTION INTRAMUSCULAR at 18:40

## 2017-07-09 RX ADMIN — Medication 81 MILLIGRAM(S): at 12:37

## 2017-07-09 RX ADMIN — LIDOCAINE AND PRILOCAINE CREAM 1 APPLICATION(S): 25; 25 CREAM TOPICAL at 20:56

## 2017-07-09 RX ADMIN — Medication 250 MILLIGRAM(S): at 06:22

## 2017-07-09 RX ADMIN — GABAPENTIN 300 MILLIGRAM(S): 400 CAPSULE ORAL at 12:37

## 2017-07-09 RX ADMIN — ENOXAPARIN SODIUM 40 MILLIGRAM(S): 100 INJECTION SUBCUTANEOUS at 12:35

## 2017-07-09 RX ADMIN — PIPERACILLIN AND TAZOBACTAM 200 GRAM(S): 4; .5 INJECTION, POWDER, LYOPHILIZED, FOR SOLUTION INTRAVENOUS at 05:28

## 2017-07-09 NOTE — CONSULT NOTE ADULT - ATTENDING COMMENTS
Patient seen and examined.  Agree with above NP note.      cv stable  ? mechanical fall  tele   cont home cv meds  inc lasix ot qd  watch bmp  not candidate for ICD as above

## 2017-07-09 NOTE — H&P ADULT - ASSESSMENT
83 y/o female with a PMHx of CAD S/P CABG, ischemic cardiomyopathy with severe LV dysfunction (no ICD secondary to dementia), CVA with residual left sided paralysis and right sided weakness (bedbound), moderate AS, PAD S/P right AKA, carotid stenosis S/P CEA, HTN, HLD, GERD and pre-DM presents to ED S/P fall, found to have pulmonary edema and no pneumonia on CXR.

## 2017-07-09 NOTE — DISCHARGE NOTE ADULT - PLAN OF CARE
curing the infection and with improved ventilation, optimal gas exchange and tissue oxygenation. The main goal of treatment is to lower your low-density lipoprotein, cholesterol level enough to reduce your risk for coronary heart disease, heart attack, and other related health problems. Continue with zocor Continue with pepcid and protoni The goals are to control symptoms, to heal esophagitis, and to prevent recurrent esophagitis or other complications. Treating the condition's underlying cause, such as coronary heart disease, high blood pressure, or diabetes and reducing symptoms. Continue with lisinopril and metoprolol fall prevention by improving the safety of the physical environment The goal is to lower high blood pressure and protect important organs, like the brain, heart, and kidneys from damage The main goal is to normalize glucose levels and prevent or delay progression to diabetes and associated microvascular complications. Follow up with your PMD within one week.  Call to schedule follow up appointment. Diet and activity as tolerated.  Please continue taking all home medications as directed. Continue with pepcid and protonix Follow up with Dr Hernandez with house calls within one week. Diet and activity as tolerated.  Please continue taking all home medications as directed.

## 2017-07-09 NOTE — DISCHARGE NOTE ADULT - SECONDARY DIAGNOSIS.
HLD (hyperlipidemia) GERD (gastroesophageal reflux disease) CHF (congestive heart failure) Fall HTN (hypertension) Pre-diabetes

## 2017-07-09 NOTE — DISCHARGE NOTE ADULT - PATIENT PORTAL LINK FT
“You can access the [facility] Patient Portal, offered by [facility], by registering with the following website: [email or web address]”

## 2017-07-09 NOTE — CONSULT NOTE ADULT - ASSESSMENT
81 y/o male with CAD/ CABG, ICMP/Sys HF (no ICD secondary to dementia), CVA, moderate AS, PAD S/P right AKA, carotid stenosis S/P CEA, HTN,  admitted with s/p fall 83 y/o male with CAD/ CABG, ICMP/Sys HF , CVA, moderate AS, PAD S/P right AKA, carotid stenosis S/P CEA, HTN,  admitted with s/p fall     1. cv stable no chest pain or sob.  no evidence of acute ischemia/ACS   no change from previous EKG     2. S/p  fall likely mechanical   pt c/s   head ct negative    3. Acute on chronic Sys HF  known ICMP/ sev LV sys HF, not candidate for AICD secondary to advance dementia  evidence of fluid overload on chest  x ray  start lasix 20 mg po daily   continue with acei/ BB    4. Mod- Sev AS : known   continue to monitor     5. HLD : continue with statin     6. DVT ppx 81 y/o male with CAD/ CABG, ICMP/Sys HF , CVA, moderate AS, PAD S/P right AKA, carotid stenosis S/P CEA, HTN,  admitted with s/p fall     1. cv stable no chest pain or sob.  no evidence of acute ischemia/ACS   no change from previous EKG     2. S/p  fall likely mechanical   pt c/s   head ct negative    3. Acute on chronic Sys HF  known ICMP/ sev LV sys HF, not candidate for AICD secondary to advance dementia  evidence of fluid overload on chest  x ray  inc lasix 20 mg po daily   continue with acei/ BB    4. Mod- Sev AS : known   continue to monitor     5. HLD : continue with statin     6. DVT ppx

## 2017-07-09 NOTE — CONSULT NOTE ADULT - ASSESSMENT
82 year-old man with azotemia with BUN elevated with creatinine at baseline likely pt's baseline.  He will likely require mild intravascular volume depletion to maintain optimal cardiac status, and therefore a mildly eleveted BUN is to be tolerated (and expected).  A decrease BUN may represent mild volume overload in the setting of CHF and diuresis may be beneficial.  HTN with BP quite high also argues in favor of volume overload which may improve with diuresis.

## 2017-07-09 NOTE — ED PROVIDER NOTE - OBJECTIVE STATEMENT
81 y/o male, with a PmHx of CVA with R sided paralysis, R knee amputation, Dementia, CAD, CABG, Systolic HF, HTN, HLD and DM, p/w fall.  Pt states his aid saw him fall and called EMS today.  Pt denies symptoms.  Pt AOx2 NAD.  Per triage pt was reclining in a chair fell back and hit his head.  No family or aid present today.  Pt denies other complains. 83 y/o male, with a PmHx of CVA with R sided paralysis, R knee amputation, Dementia, CAD, CABG, Systolic HF, HTN, HLD and DM, p/w fall.  Pt states his aid saw him fall and called EMS today.  Pt denies symptoms.  Pt AOx2 NAD.  Per triage pt was reclining in a chair fell back and hit his head.  No family or aid present today.  Pt denies other complains.    Attending/Juani: 81 yo M as described above, pt from home w/ 24/7 HHA, reportedly fell fro chair though unclear if witnesses. Pt w/ mult med problems including dementia, CVA, does not recall events and is a poor historian.

## 2017-07-09 NOTE — ED PROVIDER NOTE - MEDICAL DECISION MAKING DETAILS
81 y/o male, with a PmHx of CVA with R sided paralysis, R knee amputation, Dementia, CAD, CABG, Systolic HF, HTN, HLD and DM, p/w fall.  VSS WNL.  No signs of acute trauma.  Pt denies injuries.  Cough and rales. Evaluate for head injury, given physical findings evaluate for PNA.  Treat symptomatically.  Reassess.

## 2017-07-09 NOTE — H&P ADULT - HISTORY OF PRESENT ILLNESS
83 y/o female with a PMHx of CAD S/P CABG, ischemic cardiomyopathy with severe LV dysfunction (no ICD secondary to dementia), CVA with residual left sided paralysis and right sided weakness (bedbound), moderate AS, PAD S/P right AKA, carotid stenosis S/P CEA, HTN, HLD, GERD, pre-DM and dementia presents to ED S/P fall.  Pt has a home health aide 24 hours per day, 7 days per week. Pt is a poor historian; no one at bedside to obtain collateral information, including HHA. Pt does not remember why he is here; he states that he thinks it is because his sugar is high. According to triage and ED provider note, pt had an unwitnessed fall at home and was found by home health aide. Pt does not remember this at all. Unable to obtain elaborate ROS. Pt only admits to dry cough but denies other complaints, including fever, chills, headache, dizziness, chest pain, shortness of breath, abdominal pain, N/V/D/C, hematochezia, melena. Upon arrival to ED, EKG: NSR at 77 bpm with LAD, RBBB. CE x1: Negative. CXR: Small left pleural effusion and mild pulmonary edema. CT head and C-spine: No intracranial hemorrhage or mass effect. Chronic changes as described including large chronic infarcts. No acute cervical spine fracture. Degenerative changes as described. H/H: 10.9/33.9. Platelets: 141. Glucose: 209. Pt was admitted to telemetry.

## 2017-07-09 NOTE — DISCHARGE NOTE ADULT - FINDINGS/TREATMENT
Small  left  pleural  effusion  and  mild  pulmonary  edema. No  intracranial  hemorrhage  or  mass  effect. No  acute  cervical  spine  fracture.  Degenerative  changes.

## 2017-07-09 NOTE — DISCHARGE NOTE ADULT - CARE PLAN
Principal Discharge DX:	Pneumonia  Goal:	curing the infection and with improved ventilation, optimal gas exchange and tissue oxygenation.  Secondary Diagnosis:	HLD (hyperlipidemia)  Goal:	The main goal of treatment is to lower your low-density lipoprotein, cholesterol level enough to reduce your risk for coronary heart disease, heart attack, and other related health problems.  Instructions for follow-up, activity and diet:	Continue with zocor  Secondary Diagnosis:	GERD (gastroesophageal reflux disease)  Goal:	The goals are to control symptoms, to heal esophagitis, and to prevent recurrent esophagitis or other complications.  Instructions for follow-up, activity and diet:	Continue with pepcid and protoni  Secondary Diagnosis:	CHF (congestive heart failure)  Goal:	Treating the condition's underlying cause, such as coronary heart disease, high blood pressure, or diabetes and reducing symptoms.  Instructions for follow-up, activity and diet:	Continue with lisinopril and metoprolol  Secondary Diagnosis:	Fall  Goal:	fall prevention by improving the safety of the physical environment  Secondary Diagnosis:	HTN (hypertension)  Goal:	The goal is to lower high blood pressure and protect important organs, like the brain, heart, and kidneys from damage  Instructions for follow-up, activity and diet:	Continue with lisinopril and metoprolol  Secondary Diagnosis:	Pre-diabetes  Goal:	The main goal is to normalize glucose levels and prevent or delay progression to diabetes and associated microvascular complications. Principal Discharge DX:	Pneumonia  Goal:	curing the infection and with improved ventilation, optimal gas exchange and tissue oxygenation.  Instructions for follow-up, activity and diet:	Follow up with your PMD within one week.  Call to schedule follow up appointment. Diet and activity as tolerated.  Please continue taking all home medications as directed.  Secondary Diagnosis:	HLD (hyperlipidemia)  Goal:	The main goal of treatment is to lower your low-density lipoprotein, cholesterol level enough to reduce your risk for coronary heart disease, heart attack, and other related health problems.  Instructions for follow-up, activity and diet:	Continue with zocor  Secondary Diagnosis:	GERD (gastroesophageal reflux disease)  Goal:	The goals are to control symptoms, to heal esophagitis, and to prevent recurrent esophagitis or other complications.  Instructions for follow-up, activity and diet:	Continue with pepcid and protonix  Secondary Diagnosis:	CHF (congestive heart failure)  Goal:	Treating the condition's underlying cause, such as coronary heart disease, high blood pressure, or diabetes and reducing symptoms.  Instructions for follow-up, activity and diet:	Continue with lisinopril and metoprolol  Secondary Diagnosis:	Fall  Goal:	fall prevention by improving the safety of the physical environment  Secondary Diagnosis:	HTN (hypertension)  Goal:	The goal is to lower high blood pressure and protect important organs, like the brain, heart, and kidneys from damage  Instructions for follow-up, activity and diet:	Continue with lisinopril and metoprolol  Secondary Diagnosis:	Pre-diabetes  Goal:	The main goal is to normalize glucose levels and prevent or delay progression to diabetes and associated microvascular complications. Principal Discharge DX:	Pneumonia  Goal:	curing the infection and with improved ventilation, optimal gas exchange and tissue oxygenation.  Instructions for follow-up, activity and diet:	Follow up with Dr Hernandez with house calls within one week. Diet and activity as tolerated.  Please continue taking all home medications as directed.  Secondary Diagnosis:	HLD (hyperlipidemia)  Goal:	The main goal of treatment is to lower your low-density lipoprotein, cholesterol level enough to reduce your risk for coronary heart disease, heart attack, and other related health problems.  Instructions for follow-up, activity and diet:	Continue with zocor  Secondary Diagnosis:	GERD (gastroesophageal reflux disease)  Goal:	The goals are to control symptoms, to heal esophagitis, and to prevent recurrent esophagitis or other complications.  Instructions for follow-up, activity and diet:	Continue with pepcid and protonix  Secondary Diagnosis:	CHF (congestive heart failure)  Goal:	Treating the condition's underlying cause, such as coronary heart disease, high blood pressure, or diabetes and reducing symptoms.  Instructions for follow-up, activity and diet:	Continue with lisinopril and metoprolol  Secondary Diagnosis:	Fall  Goal:	fall prevention by improving the safety of the physical environment  Secondary Diagnosis:	HTN (hypertension)  Goal:	The goal is to lower high blood pressure and protect important organs, like the brain, heart, and kidneys from damage  Instructions for follow-up, activity and diet:	Continue with lisinopril and metoprolol  Secondary Diagnosis:	Pre-diabetes  Goal:	The main goal is to normalize glucose levels and prevent or delay progression to diabetes and associated microvascular complications.

## 2017-07-09 NOTE — H&P ADULT - PMH
AS (aortic stenosis)    CAD (coronary artery disease)  S/P CABG  Carotid stenosis  S/P CEA  CHF (congestive heart failure)    CVA (cerebral vascular accident)  bedbound  Dementia    GERD (gastroesophageal reflux disease)    HLD (hyperlipidemia)    HTN (hypertension)    PAD (peripheral artery disease)  S/P right AKA  Pre-diabetes

## 2017-07-09 NOTE — DISCHARGE NOTE ADULT - CARE PROVIDER_API CALL
Bairon Olson), Cardiovascular Disease; Internal Medicine; Interventional Cardiology; Nuclear Cardiology  [address] [facility] [address]  [place] [address]  Phone: [phone]  Fax: [phone]    Eliel Edmond), Internal Medicine; Nephrology  [address] [place] [address]  Phone: [phone]  Fax: [phone]

## 2017-07-09 NOTE — CONSULT NOTE ADULT - SUBJECTIVE AND OBJECTIVE BOX
HPI:  83 y/o male with a PMHx/ Surg hx as mentioned below admitted for  S/P fall.  Pt has a home health aide 24 hours per day, 7 days per week. Pt is a poor historian; no one at bedside to obtain collateral information, including HHA. Pt does not remember why he is here; he states that he thinks it is because his sugar is high. According to triage and ED provider note, pt had an unwitnessed fall at home and was found by home health aide. Pt does not remember this at all. Unable to obtain elaborate ROS. Pt only admits to dry cough but denies other complaints, including fever, chills, headache, dizziness, chest pain, shortness of breath, abdominal pain, N/V/D/C, hematochezia, melena.     PAST MEDICAL & SURGICAL HISTORY:  Dementia  Pre-diabetes  HLD (hyperlipidemia)  HTN (hypertension)  Sys CHF (congestive heart failure)  AS (aortic stenosis)  GERD (gastroesophageal reflux disease)  CVA (cerebral vascular accident): bedbound  CAD (coronary artery disease): S/P CABG  Carotid stenosis: S/P CEA  PAD (peripheral artery disease): S/P right AKA  S/P carotid endarterectomy  S/P AKA (above knee amputation) unilateral, right  S/P CABG X 4: 1998          PREVIOUS DIAGNOSTIC TESTING:    [ x] Echocardiogram:  < from: Transthoracic Echocardiogram (09.09.16 @ 15:23) >  Ejection Fraction (Teicholtz): 23 %    < from: Transthoracic Echocardiogram (09.09.16 @ 15:23) >  ------------------------------------------------------------------------  CONCLUSIONS:  1. Mitral annular calcification and calcified mitral  leaflets with decreased diastolic opening. Minimal mitral  regurgitation. Mean transmitral valve gradient equals 4 mm  Hg, consistent with mild mitral stenosis.  2. Calcified aortic valve with decreased opening. Leaflet  morphology not well visualized, probable moderate aortic  stenosis.  Peak transaortic valve gradient equals 26 mm Hg,  mean transaortic valve gradient equals 11 mm Hg, which  could be underestimated in the setting of severe LV  dysfunction.  3. Severe left ventricular enlargement.  4. Severe global left ventricular systolic dysfunction.  5. Normal right ventricular size and function.  6. Normal tricuspid valve.  Mild-moderate tricuspid  regurgitation.  7. Estimated pulmonary artery systolic pressure equals 35  mm Hg, assuming right atrial pressure equals 10  mm Hg,  consistent with borderline pulmonary hypertension.  8. Bilateral pleural effusions.  ------------------------------------------------------------------------    < end of copied text >    [ ]  Catheterization:    [ x] Stress Test:  	  < from: Nuclear Stress Test-Pharmacologic (07.06.16 @ 09:03) >  ------------------------------------------------------------------------  IMPRESSIONS:Abnormal Study  * Myocardial Perfusion SPECT results are abnormal.  * There is a large, severe defect in inferior wall that is  fixed, suggestive of infarct.There are large, moderate to  severe defects in anterolateral, apical walls that are  partially reversible, suggestive of infarction with  moderate cem-infarct ischemia.There is a  defect.  * Post-stress gated wall motion analysis was performed  (LVEF = 23 %;LVEDV = 293 ml.), revealing severe  hypokinesis. The inferior wall is akinetic.  ------------------------------------------------------------------------    < end of copied text >    Home Medications:   * Patient Currently Takes Medications as of 31-May-2017 08:29 documented in Prescription Writer  metoprolol tartrate 25 mg oral tablet: 1 tab(s) orally 2 times a day  lidocaine 5% topical film: 2 patch transdermal once a day  furosemide 20 mg oral tablet: 1 tab(s) orally every 48 hours  Neurontin 300 mg oral capsule: 1 cap(s) orally once a day  Emla 2.5%-2.5% topical cream: 1 application topically every 3 hours, As needed, Topical moderate to severe pain  Nyamyc 100,000 units/g topical powder: 1 application topically 3 times a day  Protonix 40 mg oral delayed release tablet: 1 tab(s) orally once a day (before a meal)  Pepcid 20 mg oral tablet: 1 tab(s) orally 2 times a day  Zestril 2.5 mg oral tablet: 1 tab(s) orally once a day  risperiDONE 1 mg oral tablet: 1 tab(s) orally once a day (at bedtime) MDD:1  risperiDONE 0.5 mg oral tablet: 1 tab(s) orally once a day in the morning. Do not take if sedation occurs  citalopram 10 mg oral tablet: 1 tab(s) orally once a day  simvastatin 20 mg oral tablet: 1 tab(s) orally once a day (at bedtime)  aspirin 81 mg oral tablet, chewable: 1 tab(s) orally once a day  senna oral tablet: 2 tab(s) orally once a day (at bedtime), As needed, Constipation          MEDICATIONS:  MEDICATIONS  (STANDING):  aspirin  chewable 81 milliGRAM(s) Oral daily  lisinopril 2.5 milliGRAM(s) Oral daily  gabapentin 300 milliGRAM(s) Oral daily  citalopram 10 milliGRAM(s) Oral daily  simvastatin 20 milliGRAM(s) Oral at bedtime  risperiDONE   Tablet 0.5 milliGRAM(s) Oral daily  risperiDONE   Tablet 1 milliGRAM(s) Oral at bedtime  metoprolol 25 milliGRAM(s) Oral two times a day  lidocaine   Patch 2 Patch Transdermal daily  nystatin Powder 1 Application(s) Topical three times a day  famotidine    Tablet 20 milliGRAM(s) Oral two times a day  pantoprazole    Tablet 40 milliGRAM(s) Oral before breakfast  enoxaparin Injectable 40 milliGRAM(s) SubCutaneous every 24 hours  insulin lispro (HumaLOG) corrective regimen sliding scale   SubCutaneous three times a day before meals  insulin lispro (HumaLOG) corrective regimen sliding scale   SubCutaneous at bedtime  dextrose 5%. 1000 milliLiter(s) (50 mL/Hr) IV Continuous <Continuous>  dextrose 50% Injectable 12.5 Gram(s) IV Push once  dextrose 50% Injectable 25 Gram(s) IV Push once  dextrose 50% Injectable 25 Gram(s) IV Push once      FAMILY HISTORY:  No pertinent family history in first degree relatives      SOCIAL HISTORY:    [x ] Non-smoker  [ ] Smoker  [ ] Alcohol    Allergies    No Known Allergies    Intolerances    	    REVIEW OF SYSTEMS:  CONSTITUTIONAL: No fever, weight loss, or fatigue  EYES: No eye pain, visual disturbances, or discharge  ENMT:  No difficulty hearing, tinnitus, vertigo; No sinus or throat pain  NECK: No pain or stiffness  RESPIRATORY: No cough, wheezing, chills or hemoptysis; No Shortness of Breath  CARDIOVASCULAR: No chest pain, palpitations, passing out, dizziness, or leg swelling  GASTROINTESTINAL: No abdominal or epigastric pain. No nausea, vomiting, or hematemesis; No diarrhea or constipation. No melena or hematochezia.  GENITOURINARY: No dysuria, frequency, hematuria, or incontinence  NEUROLOGICAL: No headaches, memory loss, loss of strength, numbness, or tremors  SKIN: No itching, burning, rashes, or lesions   	    [ ] All others negative	  [ ] Unable to obtain    PHYSICAL EXAM:  T(C): 36.6 (07-09-17 @ 12:04), Max: 37.1 (07-09-17 @ 02:50)  HR: 79 (07-09-17 @ 12:04) (60 - 80)  BP: 144/100 (07-09-17 @ 12:04) (140/63 - 190/70)  RR: 22 (07-09-17 @ 12:04) (18 - 22)  SpO2: 97% (07-09-17 @ 12:04) (24% - 100%)  Wt(kg): --  I&O's Summary      Appearance: Normal	  Psychiatry: A & O x 3, Mood & affect appropriate  HEENT:   Normal oral mucosa, PERRL, EOMI	  Lymphatic: No lymphadenopathy  Cardiovascular: Normal S1 S2,RRR, No JVD, No murmurs  Respiratory: Lungs clear to auscultation	  Gastrointestinal:  Soft, Non-tender, + BS	  Skin: No rashes, No ecchymoses, No cyanosis	  Neurologic: Non-focal  Extremities: Normal range of motion, No clubbing, cyanosis or edema  Vascular: Peripheral pulses palpable 2+ bilaterally    TELEMETRY:   	    ECG:  NSR HR 77 , LAD,  RBBB,   TWI 	    RADIOLOGY:  OTHER:  < from: Xray Chest 1 View AP- PORTABLE-Urgent (07.09.17 @ 01:48) >  IMPRESSION:     Small left pleural effusion and mild pulmonary edema.        ---------------------------------------------------------------------------------------------------    < from: CT Head No Cont (07.09.17 @ 01:57) >    IMPRESSION:    1. Head CT: No intracranial hemorrhage or mass effect. Chronic changes as   described including large chronic infarcts.    2. Cervical spine CT: No acute cervical spine fracture. Degenerative   changes as described.        -  	  LABS:	 	    CARDIAC MARKERS:  trop x 1 negative     CKMB: 2.08 ng/mL (07-09 @ 05:45)                              10.9   8.40  )-----------( 141      ( 09 Jul 2017 01:33 )             33.9     07-09    144  |  104  |  19  ----------------------------<  209<H>  4.1   |  22  |  0.75    Ca    8.9      09 Jul 2017 01:33    TPro  6.1  /  Alb  3.6  /  TBili  0.6  /  DBili  x   /  AST  23  /  ALT  19  /  AlkPhos  67  07-09      proBNP:   Lipid Profile:   HgA1c:   TSH: HPI:  81 y/o male with a PMHx/ Surg hx as mentioned below admitted for  S/P fall. Pt a poor historian, no family present, information obtained through h&p. Pt does not remember why he is here; he reports he is because of his sugars, does not recall falling. According to triage and ED provider note, pt had an unwitnessed fall at home and was found by home health aide.  Unable to obtain elaborate ROS. Pt only reports cough but denies other complaints, including fever, chills, headache, dizziness, chest pain, shortness of breath, abdominal pain, N/V/D/C, hematochezia, melena.     PAST MEDICAL & SURGICAL HISTORY:  Dementia  Pre-diabetes  HLD (hyperlipidemia)  HTN (hypertension)  Sys CHF (congestive heart failure)  AS (aortic stenosis)  GERD (gastroesophageal reflux disease)  CVA (cerebral vascular accident): bedbound  CAD (coronary artery disease): S/P CABG  Carotid stenosis: S/P CEA  PAD (peripheral artery disease): S/P right AKA  S/P carotid endarterectomy  S/P AKA (above knee amputation) unilateral, right  S/P CABG X 4: 1998          PREVIOUS DIAGNOSTIC TESTING:    [ x] Echocardiogram:  < from: Transthoracic Echocardiogram (09.09.16 @ 15:23) >  Ejection Fraction (Teicholtz): 23 %    < from: Transthoracic Echocardiogram (09.09.16 @ 15:23) >  ------------------------------------------------------------------------  CONCLUSIONS:  1. Mitral annular calcification and calcified mitral  leaflets with decreased diastolic opening. Minimal mitral  regurgitation. Mean transmitral valve gradient equals 4 mm  Hg, consistent with mild mitral stenosis.  2. Calcified aortic valve with decreased opening. Leaflet  morphology not well visualized, probable moderate aortic  stenosis.  Peak transaortic valve gradient equals 26 mm Hg,  mean transaortic valve gradient equals 11 mm Hg, which  could be underestimated in the setting of severe LV  dysfunction.  3. Severe left ventricular enlargement.  4. Severe global left ventricular systolic dysfunction.  5. Normal right ventricular size and function.  6. Normal tricuspid valve.  Mild-moderate tricuspid  regurgitation.  7. Estimated pulmonary artery systolic pressure equals 35  mm Hg, assuming right atrial pressure equals 10  mm Hg,  consistent with borderline pulmonary hypertension.  8. Bilateral pleural effusions.  ------------------------------------------------------------------------    < end of copied text >    [ ]  Catheterization:    [ x] Stress Test:  	  < from: Nuclear Stress Test-Pharmacologic (07.06.16 @ 09:03) >  ------------------------------------------------------------------------  IMPRESSIONS:Abnormal Study  * Myocardial Perfusion SPECT results are abnormal.  * There is a large, severe defect in inferior wall that is  fixed, suggestive of infarct.There are large, moderate to  severe defects in anterolateral, apical walls that are  partially reversible, suggestive of infarction with  moderate cem-infarct ischemia.There is a  defect.  * Post-stress gated wall motion analysis was performed  (LVEF = 23 %;LVEDV = 293 ml.), revealing severe  hypokinesis. The inferior wall is akinetic.  ------------------------------------------------------------------------    < end of copied text >    Home Medications:   * Patient Currently Takes Medications as of 31-May-2017 08:29 documented in Prescription Writer  metoprolol tartrate 25 mg oral tablet: 1 tab(s) orally 2 times a day  lidocaine 5% topical film: 2 patch transdermal once a day  furosemide 20 mg oral tablet: 1 tab(s) orally every 48 hours  Neurontin 300 mg oral capsule: 1 cap(s) orally once a day  Emla 2.5%-2.5% topical cream: 1 application topically every 3 hours, As needed, Topical moderate to severe pain  Nyamyc 100,000 units/g topical powder: 1 application topically 3 times a day  Protonix 40 mg oral delayed release tablet: 1 tab(s) orally once a day (before a meal)  Pepcid 20 mg oral tablet: 1 tab(s) orally 2 times a day  Zestril 2.5 mg oral tablet: 1 tab(s) orally once a day  risperiDONE 1 mg oral tablet: 1 tab(s) orally once a day (at bedtime) MDD:1  risperiDONE 0.5 mg oral tablet: 1 tab(s) orally once a day in the morning. Do not take if sedation occurs  citalopram 10 mg oral tablet: 1 tab(s) orally once a day  simvastatin 20 mg oral tablet: 1 tab(s) orally once a day (at bedtime)  aspirin 81 mg oral tablet, chewable: 1 tab(s) orally once a day  senna oral tablet: 2 tab(s) orally once a day (at bedtime), As needed, Constipation          MEDICATIONS:  MEDICATIONS  (STANDING):  aspirin  chewable 81 milliGRAM(s) Oral daily  lisinopril 2.5 milliGRAM(s) Oral daily  gabapentin 300 milliGRAM(s) Oral daily  citalopram 10 milliGRAM(s) Oral daily  simvastatin 20 milliGRAM(s) Oral at bedtime  risperiDONE   Tablet 0.5 milliGRAM(s) Oral daily  risperiDONE   Tablet 1 milliGRAM(s) Oral at bedtime  metoprolol 25 milliGRAM(s) Oral two times a day  lidocaine   Patch 2 Patch Transdermal daily  nystatin Powder 1 Application(s) Topical three times a day  famotidine    Tablet 20 milliGRAM(s) Oral two times a day  pantoprazole    Tablet 40 milliGRAM(s) Oral before breakfast  enoxaparin Injectable 40 milliGRAM(s) SubCutaneous every 24 hours  insulin lispro (HumaLOG) corrective regimen sliding scale   SubCutaneous three times a day before meals  insulin lispro (HumaLOG) corrective regimen sliding scale   SubCutaneous at bedtime  dextrose 5%. 1000 milliLiter(s) (50 mL/Hr) IV Continuous <Continuous>  dextrose 50% Injectable 12.5 Gram(s) IV Push once  dextrose 50% Injectable 25 Gram(s) IV Push once  dextrose 50% Injectable 25 Gram(s) IV Push once      FAMILY HISTORY:  No pertinent family history in first degree relatives      SOCIAL HISTORY:    [x ] Non-smoker  [ ] Smoker  [ ] Alcohol    Allergies    No Known Allergies    Intolerances    	    REVIEW OF SYSTEMS:  CONSTITUTIONAL: No fever, weight loss, or fatigue  EYES: No eye pain, visual disturbances, or discharge  ENMT:  No difficulty hearing, tinnitus, vertigo; No sinus or throat pain  NECK: No pain or stiffness  RESPIRATORY: No cough, wheezing, chills or hemoptysis; No Shortness of Breath  CARDIOVASCULAR: No chest pain, palpitations, passing out, dizziness, or leg swelling  GASTROINTESTINAL: No abdominal or epigastric pain. No nausea, vomiting, or hematemesis; No diarrhea or constipation. No melena or hematochezia.  GENITOURINARY: No dysuria, frequency, hematuria, or incontinence  NEUROLOGICAL: No headaches, memory loss, loss of strength, numbness, or tremors  SKIN: No itching, burning, rashes, or lesions   	    [ ] All others negative	  [x ] Unable to obtain    PHYSICAL EXAM:  T(C): 36.6 (07-09-17 @ 12:04), Max: 37.1 (07-09-17 @ 02:50)  HR: 79 (07-09-17 @ 12:04) (60 - 80)  BP: 144/100 (07-09-17 @ 12:04) (140/63 - 190/70)  RR: 22 (07-09-17 @ 12:04) (18 - 22)  SpO2: 97% (07-09-17 @ 12:04) (24% - 100%)  Wt(kg): --  I&O's Summary      Appearance: Normal	  Psychiatry: A & O x 3, Mood & affect appropriate  HEENT:   Normal oral mucosa, PERRL, EOMI	  Lymphatic: No lymphadenopathy  Cardiovascular: Normal S1 S2,RRR, No JVD, + sys murmur   Respiratory: Lungs clear to auscultation,  decrease BS at base   Gastrointestinal:  Soft, Non-tender, + BS	  Skin: No rashes, No ecchymoses, No cyanosis	  Neurologic: Non-focal  Extremities: R AKA, No edema  Vascular: Peripheral pulses palpable 2+ bilaterally    TELEMETRY: NSR with PVC HR 78  	    ECG:  NSR HR 77 , LAD,  RBBB,   TWI 	    RADIOLOGY:  OTHER:  < from: Xray Chest 1 View AP- PORTABLE-Urgent (07.09.17 @ 01:48) >  IMPRESSION:     Small left pleural effusion and mild pulmonary edema.        ---------------------------------------------------------------------------------------------------    < from: CT Head No Cont (07.09.17 @ 01:57) >    IMPRESSION:    1. Head CT: No intracranial hemorrhage or mass effect. Chronic changes as   described including large chronic infarcts.    2. Cervical spine CT: No acute cervical spine fracture. Degenerative   changes as described.        -  	  LABS:	 	    CARDIAC MARKERS:  trop x 1 negative     CKMB: 2.08 ng/mL (07-09 @ 05:45)                              10.9   8.40  )-----------( 141      ( 09 Jul 2017 01:33 )             33.9     07-09    144  |  104  |  19  ----------------------------<  209<H>  4.1   |  22  |  0.75    Ca    8.9      09 Jul 2017 01:33    TPro  6.1  /  Alb  3.6  /  TBili  0.6  /  DBili  x   /  AST  23  /  ALT  19  /  AlkPhos  67  07-09      proBNP:   Lipid Profile:   HgA1c:   TSH:

## 2017-07-09 NOTE — ED ADULT NURSE NOTE - CHIEF COMPLAINT QUOTE
Pt brought in by EMS from home, s/p fall. As per EMS pt was reclining back from chair and hit his head against the wall. Home health aide walked in and saw patient lying on the floor. Pt has hx of dementia, psychosis. Pt unsure of LOC. C-collar in placed.

## 2017-07-09 NOTE — CONSULT NOTE ADULT - SUBJECTIVE AND OBJECTIVE BOX
[facility] NEPHROLOGY- CONSULTATION NOTE    Patient is a 82y Male with known CHF and AS presented to the hospital s/p fall, and was found to have elevated BUN to 30 that decreased to 20 on repeat.  He has a history of JULIO in the past.  Pt with dementia and unable to provide history.    PAST MEDICAL & SURGICAL HISTORY:  Dementia  Pre-diabetes  HLD (hyperlipidemia)  HTN (hypertension)  CHF (congestive heart failure)  AS (aortic stenosis)  GERD (gastroesophageal reflux disease)  CVA (cerebral vascular accident): bedbound  CAD (coronary artery disease): S/P CABG  Carotid stenosis: S/P CEA  PAD (peripheral artery disease): S/P right AKA  S/P carotid endarterectomy  S/P AKA (above knee amputation) unilateral, right  S/P CABG X 4: 1998    No Known Allergies    Home Medications Reviewed  Hospital Medications:   MEDICATIONS  (STANDING):  aspirin  chewable 81 milliGRAM(s) Oral daily  lisinopril 2.5 milliGRAM(s) Oral daily  gabapentin 300 milliGRAM(s) Oral daily  citalopram 10 milliGRAM(s) Oral daily  simvastatin 20 milliGRAM(s) Oral at bedtime  risperiDONE   Tablet 0.5 milliGRAM(s) Oral daily  risperiDONE   Tablet 1 milliGRAM(s) Oral at bedtime  metoprolol 25 milliGRAM(s) Oral two times a day  lidocaine   Patch 2 Patch Transdermal daily  nystatin Powder 1 Application(s) Topical three times a day  famotidine    Tablet 20 milliGRAM(s) Oral two times a day  pantoprazole    Tablet 40 milliGRAM(s) Oral before breakfast  enoxaparin Injectable 40 milliGRAM(s) SubCutaneous every 24 hours  insulin lispro (HumaLOG) corrective regimen sliding scale   SubCutaneous three times a day before meals  insulin lispro (HumaLOG) corrective regimen sliding scale   SubCutaneous at bedtime  dextrose 5%. 1000 milliLiter(s) (50 mL/Hr) IV Continuous <Continuous>  dextrose 50% Injectable 12.5 Gram(s) IV Push once  dextrose 50% Injectable 25 Gram(s) IV Push once  dextrose 50% Injectable 25 Gram(s) IV Push once  acetaminophen   Tablet 650 milliGRAM(s) Oral once    SOCIAL HISTORY:  Denies ETOh,Smoking,   FAMILY HISTORY:  No pertinent family history in first degree relatives    REVIEW OF SYSTEMS:  CONSTITUTIONAL: + weakness  EYES/ENT: No visual changes  RESPIRATORY: No cough, wheezing, hemoptysis; No shortness of breath  CARDIOVASCULAR: No chest pain or palpitations.  GASTROINTESTINAL: No abdominal or epigastric pain. No nausea, vomiting, or hematemesis; No diarrhea or constipation. No melena or hematochezia.  GENITOURINARY: No dysuria, frequency, foamy urine, urinary urgency, incontinence or hematuria  NEUROLOGICAL: B weakness  SKIN: No itching, burning, rashes, or lesions   VASCULAR: No bilateral lower extremity edema.   MUSCULOSKELETAL: + back pain  Unable to obtain complete ROS.    VITALS:  T(F): 97.8 (07-09-17 @ 10:26), Max: 98.8 (07-09-17 @ 02:50)  HR: 80 (07-09-17 @ 10:26)  BP: 190/70 (07-09-17 @ 10:26)  RR: 18 (07-09-17 @ 10:26)  SpO2: 24% (07-09-17 @ 10:26)  Wt(kg): --    Height (cm): 167.64 (07-09 @ 09:53)  Weight (kg): 99.8 (07-09 @ 09:53)  BMI (kg/m2): 35.5 (07-09 @ 09:53)  BSA (m2): 2.08 (07-09 @ 09:53)    PHYSICAL EXAM:  Constitutional: NAD  HEENT: anicteric sclera, oropharynx clear, MMM  Neck: No JVD  Respiratory: CTAB, no wheezes, rales or rhonchi  Cardiovascular: S1, S2, RRR  Gastrointestinal: BS+, soft, NT/ND  Extremities: No cyanosis or clubbing. No peripheral edema  Neurological: A/O x 1, L hemiparesis, R severe weakness.  Psychiatric: Confused, a bit agidated  : No CVA tenderness. No martinez.   Skin: No rashes      LABS:  07-09    144  |  104  |  19  ----------------------------<  209<H>  4.1   |  22  |  0.75    Ca    8.9      09 Jul 2017 01:33    TPro  6.1  /  Alb  3.6  /  TBili  0.6  /  DBili      /  AST  23  /  ALT  19  /  AlkPhos  67  07-09    Creatinine Trend: 0.75 <--                        10.9   8.40  )-----------( 141      ( 09 Jul 2017 01:33 )             33.9

## 2017-07-09 NOTE — ED PROVIDER NOTE - PHYSICAL EXAMINATION
Attending/Juani: NAD; Head-atraumatic; PERRL/EOMI, non-icterus, supple, no MESFIN, no JVD, RRR, few rales at left base; Abd-soft, NT/ND, no HSM; Rt AKA; Rt HP, A&Ox1 (name only)

## 2017-07-09 NOTE — H&P ADULT - PROBLEM SELECTOR PLAN 1
Admit to telemetry, serial cardiac enzymes, serial EKGs  Check CBC, CMP, TSH, FLP, HgA1C, BNP  Pt does not appear grossly volume overloaded but may benefit from gentle diuresis (as pt has been in JULIO before)  Cards consult with Dr. Olson called  Will defer diuresis to cardiology   Strict I&Os, monitor daily weights, 1500 cc fluid restriction, sodium restriction  Echocardiogram ordered   F/U MD note

## 2017-07-09 NOTE — H&P ADULT - NSHPLABSRESULTS_GEN_ALL_CORE
Echo, September 2016: EF 23%. Mitral annular calcification and calcified mitral leaflets with decreased diastolic opening. Mild mitral stenosis. Probable moderate aortic stenosis. Severe left ventricular enlargement. Severe global left ventricular systolic dysfunction. Normal right ventricular size and function. Bilateral pleural effusions.  ----------  EKG: NSR at 77 bpm with LAD, RBBB  CE x1: Negative  CXR: Small left pleural effusion and mild pulmonary edema.  CT head and C-spine: No intracranial hemorrhage or mass effect. Chronic changes as described including large chronic infarcts. No acute cervical spine fracture. Degenerative   changes as described.  H/H: 10.9/33.9  Platelets: 141  Glucose: 209

## 2017-07-09 NOTE — DISCHARGE NOTE ADULT - MEDICATION SUMMARY - MEDICATIONS TO TAKE
I will START or STAY ON the medications listed below when I get home from the hospital:    aspirin 81 mg oral tablet, chewable  -- 1 tab(s) by mouth once a day  -- Indication: For CAD (coronary artery disease)    acetaminophen 325 mg oral tablet  -- 2 tab(s) by mouth every 6 hours, As needed, moderate to sever pain  -- Indication: For Pain    lisinopril 2.5 mg oral tablet  -- 1 tab(s) by mouth once a day  -- Indication: For HTN (hypertension)    gabapentin 300 mg oral capsule  -- 1 cap(s) by mouth once a day  -- Indication: For Dementia    citalopram 10 mg oral tablet  -- 1 tab(s) by mouth once a day  -- Indication: For Depression    simvastatin 20 mg oral tablet  -- 1 tab(s) by mouth once a day (at bedtime)  -- Indication: For HLD (hyperlipidemia)    risperiDONE 1 mg oral tablet  -- 1 tab(s) by mouth once a day (at bedtime)  -- Indication: For behavior    risperiDONE 0.5 mg oral tablet  -- 1 tab(s) by mouth once a day  -- Indication: For behavior    metoprolol tartrate 25 mg oral tablet  -- 0.5 tab(s) by mouth 2 times a day  -- Indication: For HTN (hypertension)    lidocaine 5% topical film  -- 2 patch by transdermal patch once a day  -- Indication: For Pain    nystatin 100,000 units/g topical powder  -- 1 application on skin 3 times a day  -- Indication: For Fungal powder    furosemide 40 mg oral tablet  -- 1 tab(s) by mouth once a day  -- Indication: For Chronic congestive heart failure, unspecified congestive heart failure type    famotidine 20 mg oral tablet  -- 1 tab(s) by mouth 2 times a day  -- Indication: For GERD (gastroesophageal reflux disease)    senna oral tablet  -- 2 tab(s) by mouth once a day (at bedtime), As needed, Constipation  -- Indication: For bowel regimen    pantoprazole 40 mg oral delayed release tablet  -- 1 tab(s) by mouth once a day (before a meal)  -- Indication: For GERD (gastroesophageal reflux disease) I will START or STAY ON the medications listed below when I get home from the hospital:    aspirin 81 mg oral tablet, chewable  -- 1 tab(s) by mouth once a day  -- Indication: For CAD (coronary artery disease)    acetaminophen 325 mg oral tablet  -- 2 tab(s) by mouth every 6 hours, As needed, moderate to sever pain  -- Indication: For Pain    lisinopril 2.5 mg oral tablet  -- 1 tab(s) by mouth once a day  -- Indication: For HTN (hypertension)    gabapentin 300 mg oral capsule  -- 1 cap(s) by mouth once a day  -- Indication: For Dementia    citalopram 10 mg oral tablet  -- 1 tab(s) by mouth once a day  -- Indication: For Depression    simvastatin 20 mg oral tablet  -- 1 tab(s) by mouth once a day (at bedtime)  -- Indication: For HLD (hyperlipidemia)    risperiDONE 1 mg oral tablet  -- 1 tab(s) by mouth once a day (at bedtime)  -- Indication: For behavior    risperiDONE 0.5 mg oral tablet  -- 1 tab(s) by mouth once a day  -- Indication: For behavior    metoprolol tartrate 25 mg oral tablet  -- 1 tab(s) by mouth 2 times a day  -- Indication: For HTN (hypertension)    lidocaine 5% topical film  -- 2 patch by transdermal patch once a day  -- Indication: For Pain    nystatin 100,000 units/g topical powder  -- 1 application on skin 3 times a day  -- Indication: For Fungal powder    furosemide 40 mg oral tablet  -- 1 tab(s) by mouth once a day  -- Indication: For Chronic congestive heart failure, unspecified congestive heart failure type    famotidine 20 mg oral tablet  -- 1 tab(s) by mouth 2 times a day  -- Indication: For GERD (gastroesophageal reflux disease)    senna oral tablet  -- 2 tab(s) by mouth once a day (at bedtime), As needed, Constipation  -- Indication: For bowel regimen    pantoprazole 40 mg oral delayed release tablet  -- 1 tab(s) by mouth once a day (before a meal)  -- Indication: For GERD (gastroesophageal reflux disease)

## 2017-07-09 NOTE — H&P ADULT - PROBLEM SELECTOR PLAN 5
No evidence of ACS  Will trend enzymes  Continue ASA, Simvastatin, Metoprolol, Lisinopril   DASH diet   F/U MD note

## 2017-07-09 NOTE — H&P ADULT - PROBLEM SELECTOR PLAN 3
Pt slightly hyperglycemic but not on any DM medications  Recent A1C was 6.9  Will start insulin sliding scale and re-check A1C  Monitor finger sticks  Diabetic diet   Consider endo consult

## 2017-07-09 NOTE — H&P ADULT - RS GEN PE MLT RESP DETAILS PC
no rhonchi/no chest wall tenderness/airway patent/no intercostal retractions/respirations non-labored/wheezes/rales

## 2017-07-09 NOTE — CONSULT NOTE ADULT - ATTENDING COMMENTS
[facility] NEPHROLOGY  Eliel Edmond M.D.  Praful Dominguez D.O.  Debbie Mcqueen M.D.  Shelbi Lucas, MSN, ANP-C    Telephone: [phone]  Facsimile: [phone]    [address] [place] [address]

## 2017-07-09 NOTE — ED PROVIDER NOTE - PROGRESS NOTE DETAILS
JW No acute traumatic injury.  PT has PNA.  Given fall, AMS, ABX for HCAP pt admitted to medicine.  PT alert and responsive on admission, hemodynamically stable.

## 2017-07-10 LAB
BUN SERPL-MCNC: 13 MG/DL — SIGNIFICANT CHANGE UP (ref 7–23)
CALCIUM SERPL-MCNC: 8.9 MG/DL — SIGNIFICANT CHANGE UP (ref 8.4–10.5)
CHLORIDE SERPL-SCNC: 104 MMOL/L — SIGNIFICANT CHANGE UP (ref 98–107)
CHOLEST SERPL-MCNC: 114 MG/DL — LOW (ref 120–199)
CO2 SERPL-SCNC: 27 MMOL/L — SIGNIFICANT CHANGE UP (ref 22–31)
CREAT SERPL-MCNC: 0.65 MG/DL — SIGNIFICANT CHANGE UP (ref 0.5–1.3)
GLUCOSE SERPL-MCNC: 133 MG/DL — HIGH (ref 70–99)
HBA1C BLD-MCNC: 6.4 % — HIGH (ref 4–5.6)
HCT VFR BLD CALC: 33 % — LOW (ref 39–50)
HDLC SERPL-MCNC: 44 MG/DL — SIGNIFICANT CHANGE UP (ref 35–55)
HGB BLD-MCNC: 10.8 G/DL — LOW (ref 13–17)
LIPID PNL WITH DIRECT LDL SERPL: 53 MG/DL — SIGNIFICANT CHANGE UP
MAGNESIUM SERPL-MCNC: 1.9 MG/DL — SIGNIFICANT CHANGE UP (ref 1.6–2.6)
MCHC RBC-ENTMCNC: 32.1 PG — SIGNIFICANT CHANGE UP (ref 27–34)
MCHC RBC-ENTMCNC: 32.7 % — SIGNIFICANT CHANGE UP (ref 32–36)
MCV RBC AUTO: 98.2 FL — SIGNIFICANT CHANGE UP (ref 80–100)
NRBC # FLD: 0 — SIGNIFICANT CHANGE UP
PLATELET # BLD AUTO: 150 K/UL — SIGNIFICANT CHANGE UP (ref 150–400)
PMV BLD: 12.1 FL — SIGNIFICANT CHANGE UP (ref 7–13)
POTASSIUM SERPL-MCNC: 4 MMOL/L — SIGNIFICANT CHANGE UP (ref 3.5–5.3)
POTASSIUM SERPL-SCNC: 4 MMOL/L — SIGNIFICANT CHANGE UP (ref 3.5–5.3)
RBC # BLD: 3.36 M/UL — LOW (ref 4.2–5.8)
RBC # FLD: 15.2 % — HIGH (ref 10.3–14.5)
SODIUM SERPL-SCNC: 145 MMOL/L — SIGNIFICANT CHANGE UP (ref 135–145)
SPECIMEN SOURCE: SIGNIFICANT CHANGE UP
SPECIMEN SOURCE: SIGNIFICANT CHANGE UP
TRIGL SERPL-MCNC: 83 MG/DL — SIGNIFICANT CHANGE UP (ref 10–149)
TSH SERPL-MCNC: 1.18 UIU/ML — SIGNIFICANT CHANGE UP (ref 0.27–4.2)
WBC # BLD: 5.92 K/UL — SIGNIFICANT CHANGE UP (ref 3.8–10.5)
WBC # FLD AUTO: 5.92 K/UL — SIGNIFICANT CHANGE UP (ref 3.8–10.5)

## 2017-07-10 PROCEDURE — 93306 TTE W/DOPPLER COMPLETE: CPT | Mod: 26

## 2017-07-10 PROCEDURE — 72128 CT CHEST SPINE W/O DYE: CPT | Mod: 26

## 2017-07-10 PROCEDURE — 72131 CT LUMBAR SPINE W/O DYE: CPT | Mod: 26

## 2017-07-10 RX ORDER — KETOROLAC TROMETHAMINE 30 MG/ML
15 SYRINGE (ML) INJECTION ONCE
Qty: 0 | Refills: 0 | Status: DISCONTINUED | OUTPATIENT
Start: 2017-07-10 | End: 2017-07-10

## 2017-07-10 RX ORDER — IBUPROFEN 200 MG
400 TABLET ORAL EVERY 8 HOURS
Qty: 0 | Refills: 0 | Status: COMPLETED | OUTPATIENT
Start: 2017-07-10 | End: 2017-07-11

## 2017-07-10 RX ORDER — ACETAMINOPHEN 500 MG
650 TABLET ORAL EVERY 6 HOURS
Qty: 0 | Refills: 0 | Status: DISCONTINUED | OUTPATIENT
Start: 2017-07-10 | End: 2017-07-13

## 2017-07-10 RX ORDER — FUROSEMIDE 40 MG
20 TABLET ORAL DAILY
Qty: 0 | Refills: 0 | Status: DISCONTINUED | OUTPATIENT
Start: 2017-07-10 | End: 2017-07-11

## 2017-07-10 RX ORDER — HALOPERIDOL DECANOATE 100 MG/ML
2 INJECTION INTRAMUSCULAR ONCE
Qty: 0 | Refills: 0 | Status: COMPLETED | OUTPATIENT
Start: 2017-07-10 | End: 2017-07-10

## 2017-07-10 RX ADMIN — PANTOPRAZOLE SODIUM 40 MILLIGRAM(S): 20 TABLET, DELAYED RELEASE ORAL at 06:19

## 2017-07-10 RX ADMIN — GABAPENTIN 300 MILLIGRAM(S): 400 CAPSULE ORAL at 12:30

## 2017-07-10 RX ADMIN — RISPERIDONE 0.5 MILLIGRAM(S): 4 TABLET ORAL at 12:30

## 2017-07-10 RX ADMIN — Medication 400 MILLIGRAM(S): at 12:30

## 2017-07-10 RX ADMIN — Medication 15 MILLIGRAM(S): at 08:42

## 2017-07-10 RX ADMIN — NYSTATIN CREAM 1 APPLICATION(S): 100000 CREAM TOPICAL at 21:05

## 2017-07-10 RX ADMIN — Medication 25 MILLIGRAM(S): at 06:19

## 2017-07-10 RX ADMIN — SIMVASTATIN 20 MILLIGRAM(S): 20 TABLET, FILM COATED ORAL at 21:05

## 2017-07-10 RX ADMIN — Medication 81 MILLIGRAM(S): at 12:30

## 2017-07-10 RX ADMIN — Medication 25 MILLIGRAM(S): at 18:03

## 2017-07-10 RX ADMIN — Medication 400 MILLIGRAM(S): at 21:04

## 2017-07-10 RX ADMIN — CITALOPRAM 10 MILLIGRAM(S): 10 TABLET, FILM COATED ORAL at 12:30

## 2017-07-10 RX ADMIN — Medication 400 MILLIGRAM(S): at 21:58

## 2017-07-10 RX ADMIN — Medication 400 MILLIGRAM(S): at 13:15

## 2017-07-10 RX ADMIN — LISINOPRIL 2.5 MILLIGRAM(S): 2.5 TABLET ORAL at 06:19

## 2017-07-10 RX ADMIN — FAMOTIDINE 20 MILLIGRAM(S): 10 INJECTION INTRAVENOUS at 06:19

## 2017-07-10 RX ADMIN — Medication 20 MILLIGRAM(S): at 18:03

## 2017-07-10 RX ADMIN — Medication 15 MILLIGRAM(S): at 09:00

## 2017-07-10 RX ADMIN — LIDOCAINE 2 PATCH: 4 CREAM TOPICAL at 12:30

## 2017-07-10 RX ADMIN — Medication 1: at 18:01

## 2017-07-10 RX ADMIN — FAMOTIDINE 20 MILLIGRAM(S): 10 INJECTION INTRAVENOUS at 18:03

## 2017-07-10 RX ADMIN — NYSTATIN CREAM 1 APPLICATION(S): 100000 CREAM TOPICAL at 18:03

## 2017-07-10 RX ADMIN — NYSTATIN CREAM 1 APPLICATION(S): 100000 CREAM TOPICAL at 06:14

## 2017-07-10 RX ADMIN — HALOPERIDOL DECANOATE 2 MILLIGRAM(S): 100 INJECTION INTRAMUSCULAR at 22:19

## 2017-07-10 RX ADMIN — RISPERIDONE 1 MILLIGRAM(S): 4 TABLET ORAL at 21:04

## 2017-07-10 RX ADMIN — ENOXAPARIN SODIUM 40 MILLIGRAM(S): 100 INJECTION SUBCUTANEOUS at 12:30

## 2017-07-10 RX ADMIN — LIDOCAINE 2 PATCH: 4 CREAM TOPICAL at 00:16

## 2017-07-10 RX ADMIN — Medication 1: at 12:29

## 2017-07-10 NOTE — PROGRESS NOTE ADULT - SUBJECTIVE AND OBJECTIVE BOX
CARDIOLOGY FOLLOW UP     CC no acute events noted       PHYSICAL EXAM:  T(C): 36.7 (07-10-17 @ 13:19), Max: 36.9 (07-10-17 @ 05:56)  HR: 89 (07-10-17 @ 13:19) (59 - 89)  BP: 151/75 (07-10-17 @ 13:19) (140/98 - 170/70)  RR: 19 (07-10-17 @ 13:19) (19 - 20)  SpO2: 97% (07-10-17 @ 13:19) (97% - 97%)  Wt(kg): --  I&O's Summary    09 Jul 2017 07:01  -  10 Jul 2017 07:00  --------------------------------------------------------  IN: 474 mL / OUT: 1 mL / NET: 473 mL        Appearance: Normal	  Cardiovascular: Normal S1 S2,RRR, No JVD, + sys murmur   Respiratory: Lungs clear to auscultation,  decrease BS at base  Gastrointestinal:  Soft, Non-tender, + BS	  Extremities: R AKA, No edema        MEDICATIONS  (STANDING):  aspirin  chewable 81 milliGRAM(s) Oral daily  lisinopril 2.5 milliGRAM(s) Oral daily  gabapentin 300 milliGRAM(s) Oral daily  citalopram 10 milliGRAM(s) Oral daily  simvastatin 20 milliGRAM(s) Oral at bedtime  risperiDONE   Tablet 0.5 milliGRAM(s) Oral daily  risperiDONE   Tablet 1 milliGRAM(s) Oral at bedtime  metoprolol 25 milliGRAM(s) Oral two times a day  lidocaine   Patch 2 Patch Transdermal daily  nystatin Powder 1 Application(s) Topical three times a day  famotidine    Tablet 20 milliGRAM(s) Oral two times a day  pantoprazole    Tablet 40 milliGRAM(s) Oral before breakfast  enoxaparin Injectable 40 milliGRAM(s) SubCutaneous every 24 hours  insulin lispro (HumaLOG) corrective regimen sliding scale   SubCutaneous three times a day before meals  insulin lispro (HumaLOG) corrective regimen sliding scale   SubCutaneous at bedtime  dextrose 5%. 1000 milliLiter(s) (50 mL/Hr) IV Continuous <Continuous>  dextrose 50% Injectable 12.5 Gram(s) IV Push once  dextrose 50% Injectable 25 Gram(s) IV Push once  dextrose 50% Injectable 25 Gram(s) IV Push once  ibuprofen  Tablet 400 milliGRAM(s) Oral every 8 hours      TELEMETRY: 	NSR with PVC HR 98   ECG:  	  RADIOLOGY:   DIAGNOSTIC TESTING:  [ ] Echocardiogram:  [ ]  Catheterization:  [ ] Stress Test:    OTHER: 	    LABS:	 	        CKMB: 2.20 ng/mL (07-09 @ 14:22)                          10.8   5.92  )-----------( 150      ( 10 Jul 2017 06:00 )             33.0     07-10    145  |  104  |  13  ----------------------------<  133<H>  4.0   |  27  |  0.65    Ca    8.9      10 Jul 2017 06:00  Mg     1.9     07-10    TPro  6.1  /  Alb  3.6  /  TBili  0.6  /  DBili  x   /  AST  23  /  ALT  19  /  AlkPhos  67  07-09

## 2017-07-10 NOTE — PROGRESS NOTE ADULT - ASSESSMENT
83 y/o male with CAD/ CABG, ICMP/Sys HF , CVA, moderate AS, PAD S/P right AKA, carotid stenosis S/P CEA, HTN,  admitted with s/p fall     1. cv stable no chest pain or sob.    2. S/p  fall likely mechanical   pt c/s   head ct negative    3. Acute on chronic Sys HF  known ICMP/ sev LV sys HF, not candidate for AICD secondary to advance dementia  continue with  lasix 20 mg po daily   monitor i/o / bmp   continue with acei/ BB    4. Mod- Sev AS : known   continue to monitor     5. HLD : continue with statin     6. DVT ppx

## 2017-07-10 NOTE — PROGRESS NOTE ADULT - SUBJECTIVE AND OBJECTIVE BOX
Consult to be dictated, pt with multiple medical issues p/w fall, complains of LBP radiating to LE extremity  Plan  1. CT T/LS spine  2. PT  3. pain control

## 2017-07-10 NOTE — PROGRESS NOTE ADULT - SUBJECTIVE AND OBJECTIVE BOX
Patient is a 82y old  Male who presents with a chief complaint of pt stated, ' my sugar was out of wack.' (09 Jul 2017 16:36)      INTERVAL HPI/OVERNIGHT EVENTS:  T(C): 36.9 (07-10-17 @ 05:56), Max: 36.9 (07-10-17 @ 05:56)  HR: 72 (07-10-17 @ 05:56) (59 - 79)  BP: 155/77 (07-10-17 @ 05:56) (140/98 - 170/70)  RR: 20 (07-10-17 @ 05:56) (20 - 20)  SpO2: 97% (07-10-17 @ 05:56) (97% - 97%)  Wt(kg): --  I&O's Summary    09 Jul 2017 07:01  -  10 Jul 2017 07:00  --------------------------------------------------------  IN: 474 mL / OUT: 1 mL / NET: 473 mL        LABS:                        10.8   5.92  )-----------( 150      ( 10 Jul 2017 06:00 )             33.0     07-10    145  |  104  |  13  ----------------------------<  133<H>  4.0   |  27  |  0.65    Ca    8.9      10 Jul 2017 06:00  Mg     1.9     07-10    TPro  6.1  /  Alb  3.6  /  TBili  0.6  /  DBili  x   /  AST  23  /  ALT  19  /  AlkPhos  67  07-09        CAPILLARY BLOOD GLUCOSE  142 (10 Jul 2017 07:31)  165 (09 Jul 2017 21:54)  146 (09 Jul 2017 16:43)  244 (09 Jul 2017 12:04)                MEDICATIONS  (STANDING):  aspirin  chewable 81 milliGRAM(s) Oral daily  lisinopril 2.5 milliGRAM(s) Oral daily  gabapentin 300 milliGRAM(s) Oral daily  citalopram 10 milliGRAM(s) Oral daily  simvastatin 20 milliGRAM(s) Oral at bedtime  risperiDONE   Tablet 0.5 milliGRAM(s) Oral daily  risperiDONE   Tablet 1 milliGRAM(s) Oral at bedtime  metoprolol 25 milliGRAM(s) Oral two times a day  lidocaine   Patch 2 Patch Transdermal daily  nystatin Powder 1 Application(s) Topical three times a day  famotidine    Tablet 20 milliGRAM(s) Oral two times a day  pantoprazole    Tablet 40 milliGRAM(s) Oral before breakfast  enoxaparin Injectable 40 milliGRAM(s) SubCutaneous every 24 hours  insulin lispro (HumaLOG) corrective regimen sliding scale   SubCutaneous three times a day before meals  insulin lispro (HumaLOG) corrective regimen sliding scale   SubCutaneous at bedtime  dextrose 5%. 1000 milliLiter(s) (50 mL/Hr) IV Continuous <Continuous>  dextrose 50% Injectable 12.5 Gram(s) IV Push once  dextrose 50% Injectable 25 Gram(s) IV Push once  dextrose 50% Injectable 25 Gram(s) IV Push once  ibuprofen  Tablet 400 milliGRAM(s) Oral every 8 hours    MEDICATIONS  (PRN):  lidocaine/prilocaine Cream 1 Application(s) Topical every 3 hours PRN Topical moderate to severe pain  senna 2 Tablet(s) Oral at bedtime PRN Constipation  dextrose Gel 1 Dose(s) Oral once PRN Blood Glucose LESS THAN 70 milliGRAM(s)/deciliter  glucagon  Injectable 1 milliGRAM(s) IntraMuscular once PRN Glucose LESS THAN 70 milligrams/deciliter  acetaminophen   Tablet. 650 milliGRAM(s) Oral every 6 hours PRN moderate to sever pain          PHYSICAL EXAM:  GENERAL: NAD, well-groomed, well-developed  HEAD:  Atraumatic, Normocephalic  CHEST/LUNG: Clear to percussion bilaterally; No rales, rhonchi, wheezing, or rubs  HEART: Regular rate and rhythm; No murmurs, rubs, or gallops  ABDOMEN: Soft, Nontender, Nondistended; Bowel sounds present  EXTREMITIES:  2+ Peripheral Pulses, No clubbing, cyanosis, or edema  LYMPH: No lymphadenopathy noted  SKIN: No rashes or lesions    Care Discussed with Consultants/Other Providers [* ] YES  [ ] NO

## 2017-07-10 NOTE — PROGRESS NOTE ADULT - ASSESSMENT
83 yo male PMHx of CVA with R sided paralysis, Dementia, CAD, CABG, Systolic HF, HTN, HLD and DM p/w worsening confusion, slurred speech and R shoulder pain x 2 weeks, found in JULIO and Hyperkalemia.      ·  Problem: Hyperkalemia.  Plan: tele monitor  resolved  s/p IV fluids hydration  recheck BMP      ·  Problem: JULIO (acute kidney injury).  Plan: Monitor electrolytes  Trend BUN/Cr  Hold Nephrotoxic meds      ·  Problem: Confusion.  Plan: tele monitor  Aspiration and Falls precautions    CAD  cards fu

## 2017-07-11 DIAGNOSIS — R05 COUGH: ICD-10-CM

## 2017-07-11 LAB
BUN SERPL-MCNC: 18 MG/DL — SIGNIFICANT CHANGE UP (ref 7–23)
CALCIUM SERPL-MCNC: 9.2 MG/DL — SIGNIFICANT CHANGE UP (ref 8.4–10.5)
CHLORIDE SERPL-SCNC: 104 MMOL/L — SIGNIFICANT CHANGE UP (ref 98–107)
CO2 SERPL-SCNC: 24 MMOL/L — SIGNIFICANT CHANGE UP (ref 22–31)
CREAT SERPL-MCNC: 0.75 MG/DL — SIGNIFICANT CHANGE UP (ref 0.5–1.3)
GLUCOSE SERPL-MCNC: 128 MG/DL — HIGH (ref 70–99)
HCT VFR BLD CALC: 32.8 % — LOW (ref 39–50)
HGB BLD-MCNC: 10.6 G/DL — LOW (ref 13–17)
MCHC RBC-ENTMCNC: 31.3 PG — SIGNIFICANT CHANGE UP (ref 27–34)
MCHC RBC-ENTMCNC: 32.3 % — SIGNIFICANT CHANGE UP (ref 32–36)
MCV RBC AUTO: 96.8 FL — SIGNIFICANT CHANGE UP (ref 80–100)
NRBC # FLD: 0 — SIGNIFICANT CHANGE UP
PLATELET # BLD AUTO: 151 K/UL — SIGNIFICANT CHANGE UP (ref 150–400)
PMV BLD: 11.8 FL — SIGNIFICANT CHANGE UP (ref 7–13)
POTASSIUM SERPL-MCNC: 4 MMOL/L — SIGNIFICANT CHANGE UP (ref 3.5–5.3)
POTASSIUM SERPL-SCNC: 4 MMOL/L — SIGNIFICANT CHANGE UP (ref 3.5–5.3)
RBC # BLD: 3.39 M/UL — LOW (ref 4.2–5.8)
RBC # FLD: 15 % — HIGH (ref 10.3–14.5)
SODIUM SERPL-SCNC: 141 MMOL/L — SIGNIFICANT CHANGE UP (ref 135–145)
WBC # BLD: 5.12 K/UL — SIGNIFICANT CHANGE UP (ref 3.8–10.5)
WBC # FLD AUTO: 5.12 K/UL — SIGNIFICANT CHANGE UP (ref 3.8–10.5)

## 2017-07-11 RX ORDER — FUROSEMIDE 40 MG
40 TABLET ORAL DAILY
Qty: 0 | Refills: 0 | Status: DISCONTINUED | OUTPATIENT
Start: 2017-07-11 | End: 2017-07-13

## 2017-07-11 RX ORDER — HALOPERIDOL DECANOATE 100 MG/ML
1 INJECTION INTRAMUSCULAR EVERY 6 HOURS
Qty: 0 | Refills: 0 | Status: DISCONTINUED | OUTPATIENT
Start: 2017-07-11 | End: 2017-07-13

## 2017-07-11 RX ORDER — LANOLIN ALCOHOL/MO/W.PET/CERES
3 CREAM (GRAM) TOPICAL AT BEDTIME
Qty: 0 | Refills: 0 | Status: COMPLETED | OUTPATIENT
Start: 2017-07-11 | End: 2017-07-11

## 2017-07-11 RX ADMIN — NYSTATIN CREAM 1 APPLICATION(S): 100000 CREAM TOPICAL at 13:53

## 2017-07-11 RX ADMIN — Medication 400 MILLIGRAM(S): at 07:17

## 2017-07-11 RX ADMIN — NYSTATIN CREAM 1 APPLICATION(S): 100000 CREAM TOPICAL at 21:57

## 2017-07-11 RX ADMIN — LISINOPRIL 2.5 MILLIGRAM(S): 2.5 TABLET ORAL at 06:39

## 2017-07-11 RX ADMIN — Medication 25 MILLIGRAM(S): at 06:39

## 2017-07-11 RX ADMIN — RISPERIDONE 0.5 MILLIGRAM(S): 4 TABLET ORAL at 12:11

## 2017-07-11 RX ADMIN — HALOPERIDOL DECANOATE 1 MILLIGRAM(S): 100 INJECTION INTRAMUSCULAR at 18:15

## 2017-07-11 RX ADMIN — GABAPENTIN 300 MILLIGRAM(S): 400 CAPSULE ORAL at 12:11

## 2017-07-11 RX ADMIN — CITALOPRAM 10 MILLIGRAM(S): 10 TABLET, FILM COATED ORAL at 12:10

## 2017-07-11 RX ADMIN — Medication 1: at 12:11

## 2017-07-11 RX ADMIN — Medication 20 MILLIGRAM(S): at 06:39

## 2017-07-11 RX ADMIN — Medication 40 MILLIGRAM(S): at 22:57

## 2017-07-11 RX ADMIN — FAMOTIDINE 20 MILLIGRAM(S): 10 INJECTION INTRAVENOUS at 06:39

## 2017-07-11 RX ADMIN — PANTOPRAZOLE SODIUM 40 MILLIGRAM(S): 20 TABLET, DELAYED RELEASE ORAL at 06:39

## 2017-07-11 RX ADMIN — Medication 25 MILLIGRAM(S): at 17:30

## 2017-07-11 RX ADMIN — FAMOTIDINE 20 MILLIGRAM(S): 10 INJECTION INTRAVENOUS at 17:30

## 2017-07-11 RX ADMIN — Medication 650 MILLIGRAM(S): at 11:22

## 2017-07-11 RX ADMIN — LIDOCAINE 2 PATCH: 4 CREAM TOPICAL at 00:54

## 2017-07-11 RX ADMIN — Medication 650 MILLIGRAM(S): at 10:39

## 2017-07-11 RX ADMIN — Medication 81 MILLIGRAM(S): at 12:10

## 2017-07-11 RX ADMIN — Medication 3 MILLIGRAM(S): at 22:57

## 2017-07-11 RX ADMIN — NYSTATIN CREAM 1 APPLICATION(S): 100000 CREAM TOPICAL at 06:40

## 2017-07-11 RX ADMIN — LIDOCAINE 2 PATCH: 4 CREAM TOPICAL at 12:13

## 2017-07-11 RX ADMIN — SIMVASTATIN 20 MILLIGRAM(S): 20 TABLET, FILM COATED ORAL at 21:55

## 2017-07-11 RX ADMIN — RISPERIDONE 1 MILLIGRAM(S): 4 TABLET ORAL at 21:55

## 2017-07-11 RX ADMIN — Medication 400 MILLIGRAM(S): at 06:39

## 2017-07-11 RX ADMIN — ENOXAPARIN SODIUM 40 MILLIGRAM(S): 100 INJECTION SUBCUTANEOUS at 12:11

## 2017-07-11 NOTE — CONSULT NOTE ADULT - PROBLEM SELECTOR RECOMMENDATION 3
Would add either a diuretic and/or an antihypertensive medication, but choice of which one should be left up to cardiology given Hx HF.
with poor LV function: cardiology is following

## 2017-07-11 NOTE — PROGRESS NOTE ADULT - ASSESSMENT
81 yo male PMHx of CVA with R sided paralysis, Dementia, CAD, CABG, Systolic HF, HTN, HLD and DM p/w worsening confusion, slurred speech and R shoulder pain x 2 weeks, found in JULIO and Hyperkalemia.    . Cough  Pulmonary fu      ·  Problem: Hyperkalemia.  Plan: tele monitor  resolved  s/p IV fluids hydration  recheck BMP      ·  Problem: JULIO (acute kidney injury).  Plan: Monitor electrolytes  Trend BUN/Cr  Hold Nephrotoxic meds      ·  Problem: Confusion.  Plan: tele monitor  Aspiration and Falls precautions    CAD

## 2017-07-11 NOTE — PROGRESS NOTE ADULT - SUBJECTIVE AND OBJECTIVE BOX
Patient is a 82y old  Male who presents with a chief complaint of pt stated, ' my sugar was out of wack.' (09 Jul 2017 16:36)      INTERVAL HPI/OVERNIGHT EVENTS:  T(C): 36.7 (07-11-17 @ 13:18), Max: 36.8 (07-10-17 @ 17:38)  HR: 67 (07-11-17 @ 13:18) (67 - 82)  BP: 148/58 (07-11-17 @ 13:18) (148/58 - 160/74)  RR: 18 (07-11-17 @ 13:18) (18 - 18)  SpO2: 98% (07-11-17 @ 13:18) (97% - 98%)  Wt(kg): --  I&O's Summary    11 Jul 2017 07:01  -  11 Jul 2017 14:19  --------------------------------------------------------  IN: 474 mL / OUT: 0 mL / NET: 474 mL        LABS:                        10.6   5.12  )-----------( 151      ( 11 Jul 2017 05:30 )             32.8     07-11    141  |  104  |  18  ----------------------------<  128<H>  4.0   |  24  |  0.75    Ca    9.2      11 Jul 2017 05:30  Mg     1.9     07-10          CAPILLARY BLOOD GLUCOSE  197 (11 Jul 2017 11:53)  133 (11 Jul 2017 07:45)  169 (10 Jul 2017 21:11)  185 (10 Jul 2017 16:14)                MEDICATIONS  (STANDING):  aspirin  chewable 81 milliGRAM(s) Oral daily  lisinopril 2.5 milliGRAM(s) Oral daily  gabapentin 300 milliGRAM(s) Oral daily  citalopram 10 milliGRAM(s) Oral daily  simvastatin 20 milliGRAM(s) Oral at bedtime  risperiDONE   Tablet 0.5 milliGRAM(s) Oral daily  risperiDONE   Tablet 1 milliGRAM(s) Oral at bedtime  metoprolol 25 milliGRAM(s) Oral two times a day  lidocaine   Patch 2 Patch Transdermal daily  nystatin Powder 1 Application(s) Topical three times a day  famotidine    Tablet 20 milliGRAM(s) Oral two times a day  pantoprazole    Tablet 40 milliGRAM(s) Oral before breakfast  enoxaparin Injectable 40 milliGRAM(s) SubCutaneous every 24 hours  insulin lispro (HumaLOG) corrective regimen sliding scale   SubCutaneous three times a day before meals  insulin lispro (HumaLOG) corrective regimen sliding scale   SubCutaneous at bedtime  dextrose 5%. 1000 milliLiter(s) (50 mL/Hr) IV Continuous <Continuous>  dextrose 50% Injectable 12.5 Gram(s) IV Push once  dextrose 50% Injectable 25 Gram(s) IV Push once  dextrose 50% Injectable 25 Gram(s) IV Push once  furosemide    Tablet 40 milliGRAM(s) Oral daily    MEDICATIONS  (PRN):  lidocaine/prilocaine Cream 1 Application(s) Topical every 3 hours PRN Topical moderate to severe pain  senna 2 Tablet(s) Oral at bedtime PRN Constipation  dextrose Gel 1 Dose(s) Oral once PRN Blood Glucose LESS THAN 70 milliGRAM(s)/deciliter  glucagon  Injectable 1 milliGRAM(s) IntraMuscular once PRN Glucose LESS THAN 70 milligrams/deciliter  acetaminophen   Tablet. 650 milliGRAM(s) Oral every 6 hours PRN moderate to sever pain          PHYSICAL EXAM:  GENERAL: NAD, well-groomed, well-developed  HEAD:  Atraumatic, Normocephalic  CHEST/LUNG: Clear to percussion bilaterally; No rales, rhonchi, wheezing, or rubs  HEART: Regular rate and rhythm; No murmurs, rubs, or gallops  ABDOMEN: Soft, Nontender, Nondistended; Bowel sounds present  EXTREMITIES:  2+ Peripheral Pulses, No clubbing, cyanosis, or edema  LYMPH: No lymphadenopathy noted  SKIN: No rashes or lesions    Care Discussed with Consultants/Other Providers [ ] YES  [ ] NO

## 2017-07-11 NOTE — PROGRESS NOTE ADULT - SUBJECTIVE AND OBJECTIVE BOX
CARDIOLOGY FOLLOW UP     CC no events noted       PHYSICAL EXAM:  T(C): 36.7 (07-11-17 @ 13:18), Max: 36.8 (07-10-17 @ 17:38)  HR: 67 (07-11-17 @ 13:18) (67 - 82)  BP: 148/58 (07-11-17 @ 13:18) (148/58 - 160/74)  RR: 18 (07-11-17 @ 13:18) (18 - 18)  SpO2: 98% (07-11-17 @ 13:18) (97% - 98%)  Wt(kg): --  I&O's Summary    11 Jul 2017 07:01  -  11 Jul 2017 13:57  --------------------------------------------------------  IN: 474 mL / OUT: 0 mL / NET: 474 mL        Appearance: Normal	  Cardiovascular: Normal S1 S2,RRR, No JVD, + sys murmur   Respiratory: Lungs clear to auscultation,  decrease BS at base  Gastrointestinal:  Soft, Non-tender, + BS	  Extremities: R AKA, No edema      MEDICATIONS  (STANDING):  aspirin  chewable 81 milliGRAM(s) Oral daily  lisinopril 2.5 milliGRAM(s) Oral daily  gabapentin 300 milliGRAM(s) Oral daily  citalopram 10 milliGRAM(s) Oral daily  simvastatin 20 milliGRAM(s) Oral at bedtime  risperiDONE   Tablet 0.5 milliGRAM(s) Oral daily  risperiDONE   Tablet 1 milliGRAM(s) Oral at bedtime  metoprolol 25 milliGRAM(s) Oral two times a day  lidocaine   Patch 2 Patch Transdermal daily  nystatin Powder 1 Application(s) Topical three times a day  famotidine    Tablet 20 milliGRAM(s) Oral two times a day  pantoprazole    Tablet 40 milliGRAM(s) Oral before breakfast  enoxaparin Injectable 40 milliGRAM(s) SubCutaneous every 24 hours  insulin lispro (HumaLOG) corrective regimen sliding scale   SubCutaneous three times a day before meals  insulin lispro (HumaLOG) corrective regimen sliding scale   SubCutaneous at bedtime  dextrose 5%. 1000 milliLiter(s) (50 mL/Hr) IV Continuous <Continuous>  dextrose 50% Injectable 12.5 Gram(s) IV Push once  dextrose 50% Injectable 25 Gram(s) IV Push once  dextrose 50% Injectable 25 Gram(s) IV Push once  furosemide    Tablet 40 milliGRAM(s) Oral daily      TELEMETRY: 	 NSR- Sinus mandeep HR 50- 60 , PVC   with brief pauses  noted     OTHER: 	    LABS:	 	                                10.6   5.12  )-----------( 151      ( 11 Jul 2017 05:30 )             32.8     07-11    141  |  104  |  18  ----------------------------<  128<H>  4.0   |  24  |  0.75    Ca    9.2      11 Jul 2017 05:30  Mg     1.9     07-10

## 2017-07-11 NOTE — CONSULT NOTE ADULT - SUBJECTIVE AND OBJECTIVE BOX
History is difficult to obtain: spoke to NP on floor: Pulmonary called for left sided effusion.     Pt seems comfortable. in no acute respiratory distress overnight in the hospital. 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  Pt has a home health aide 24 hours per day, 7 days per week. Pt is a poor historian; no one at bedside to obtain collateral information, including HHA. Pt does not remember why he is here; he states that he thinks it is because his sugar is high. According to triage and ED provider note, pt had an unwitnessed fall at home and was found by home health aide. Pt does not remember this at all. Unable to obtain elaborate ROS. Pt only admits to dry cough but denies other complaints, including fever, chills, headache, dizziness, chest pain, shortness of breath, abdominal pain, N/V/D/C, hematochezia, melena. Upon arrival to ED, EKG: NSR at 77 bpm with LAD, RBBB. CE x1: Negative. CXR: Small left pleural effusion and mild pulmonary edema. CT head and C-spine: No intracranial hemorrhage or mass effect. Chronic changes as described including large chronic infarcts. No acute cervical spine fracture. Degenerative changes as described. H/H: 10.9/33.9. Platelets: 141. Glucose: 209. Pt was admitted to telemetry. (09 Jul 2017 09:53)      ?FOLLOWING PRESENT  [? ] Hx of PE/DVT, [? ] Hx COPD, [? ] Hx of Asthma, [ ?] Hx of Hospitalization, [? ]  Hx of BiPAP/CPAP use, [ ]? Hx of ELEAZAR    Allergies    No Known Allergies    Intolerances        PAST MEDICAL & SURGICAL HISTORY:  Dementia  Pre-diabetes  HLD (hyperlipidemia)  HTN (hypertension)  CHF (congestive heart failure)  AS (aortic stenosis)  GERD (gastroesophageal reflux disease)  CVA (cerebral vascular accident): bedbound  CAD (coronary artery disease): S/P CABG  Carotid stenosis: S/P CEA  PAD (peripheral artery disease): S/P right AKA  S/P carotid endarterectomy  S/P AKA (above knee amputation) unilateral, right  S/P CABG X 4: 1998      FAMILY HISTORY:  No pertinent family history in first degree relatives      Social History: [ ? ] TOBACCO                  [ ? ] ETOH                                 [ ? ] IVDA/DRUGS    REVIEW OF SYSTEMS      General:weak	    Skin/Breast:x  	  Ophthalmologic:x  	  ENMT:	x    Respiratory and Thorax: mild sob   	  Cardiovascular:	x    Gastrointestinal:	x    Genitourinary:	x    Musculoskeletal:x	    Neurological:	x    Psychiatric:	x    Hematology/Lymphatics:x	    Endocrine:	x    Allergic/Immunologic:	x    MEDICATIONS  (STANDING):  aspirin  chewable 81 milliGRAM(s) Oral daily  lisinopril 2.5 milliGRAM(s) Oral daily  gabapentin 300 milliGRAM(s) Oral daily  citalopram 10 milliGRAM(s) Oral daily  simvastatin 20 milliGRAM(s) Oral at bedtime  risperiDONE   Tablet 0.5 milliGRAM(s) Oral daily  risperiDONE   Tablet 1 milliGRAM(s) Oral at bedtime  metoprolol 25 milliGRAM(s) Oral two times a day  lidocaine   Patch 2 Patch Transdermal daily  nystatin Powder 1 Application(s) Topical three times a day  famotidine    Tablet 20 milliGRAM(s) Oral two times a day  pantoprazole    Tablet 40 milliGRAM(s) Oral before breakfast  enoxaparin Injectable 40 milliGRAM(s) SubCutaneous every 24 hours  insulin lispro (HumaLOG) corrective regimen sliding scale   SubCutaneous three times a day before meals  insulin lispro (HumaLOG) corrective regimen sliding scale   SubCutaneous at bedtime  dextrose 5%. 1000 milliLiter(s) (50 mL/Hr) IV Continuous <Continuous>  dextrose 50% Injectable 12.5 Gram(s) IV Push once  dextrose 50% Injectable 25 Gram(s) IV Push once  dextrose 50% Injectable 25 Gram(s) IV Push once  furosemide    Tablet 40 milliGRAM(s) Oral daily    MEDICATIONS  (PRN):  lidocaine/prilocaine Cream 1 Application(s) Topical every 3 hours PRN Topical moderate to severe pain  senna 2 Tablet(s) Oral at bedtime PRN Constipation  dextrose Gel 1 Dose(s) Oral once PRN Blood Glucose LESS THAN 70 milliGRAM(s)/deciliter  glucagon  Injectable 1 milliGRAM(s) IntraMuscular once PRN Glucose LESS THAN 70 milligrams/deciliter  acetaminophen   Tablet. 650 milliGRAM(s) Oral every 6 hours PRN moderate to sever pain       Vital Signs Last 24 Hrs  T(C): 36.7 (11 Jul 2017 13:18), Max: 36.8 (10 Jul 2017 17:38)  T(F): 98 (11 Jul 2017 13:18), Max: 98.3 (10 Jul 2017 17:38)  HR: 67 (11 Jul 2017 13:18) (67 - 82)  BP: 148/58 (11 Jul 2017 13:18) (148/58 - 160/74)  BP(mean): --  RR: 18 (11 Jul 2017 13:18) (18 - 18)  SpO2: 98% (11 Jul 2017 13:18) (97% - 98%)        I&O's Summary    11 Jul 2017 07:01  -  11 Jul 2017 13:51  --------------------------------------------------------  IN: 474 mL / OUT: 0 mL / NET: 474 mL        Physical Exam:   GENERAL: NAD, well-groomed, well-developed  HEENT: KRISSY/   Atraumatic, Normocephalic  ENMT: No tonsillar erythema, exudates, or enlargement; Moist mucous membranes, Good dentition, No lesions  NECK: Supple, No JVD, Normal thyroid  CHEST/LUNG: Clear to percussion bilaterally; No rales, rhonchi, wheezing, or rubs  CVS: Regular rate and rhythm; No murmurs, rubs, or gallops  GI: : Soft, Nontender, Nondistended; Bowel sounds present  NERVOUS SYSTEM:  Alert & Oriented X3, Good concentration; Motor Strength 5/5 B/L upper and lower extremities; DTRs 2+ intact and symmetric  EXTREMITIES:  2+ Peripheral Pulses, No clubbing, cyanosis, or edema  LYMPH: No lymphadenopathy noted  SKIN: No rashes or lesions  ENDOCRINOLOGY: No Thyromegaly  PSYCH: calm    Labs:    CARDIAC MARKERS ( 09 Jul 2017 14:22 )  x     / < 0.06 ng/mL / 75 u/L / 2.20 ng/mL / x                                10.6   5.12  )-----------( 151      ( 11 Jul 2017 05:30 )             32.8                         10.8   5.92  )-----------( 150      ( 10 Jul 2017 06:00 )             33.0                         10.9   8.40  )-----------( 141      ( 09 Jul 2017 01:33 )             33.9     07-11    141  |  104  |  18  ----------------------------<  128<H>  4.0   |  24  |  0.75  07-10    145  |  104  |  13  ----------------------------<  133<H>  4.0   |  27  |  0.65  07-09    144  |  104  |  19  ----------------------------<  209<H>  4.1   |  22  |  0.75    Ca    9.2      11 Jul 2017 05:30  Ca    8.9      10 Jul 2017 06:00  Mg     1.9     07-10    TPro  6.1  /  Alb  3.6  /  TBili  0.6  /  DBili  x   /  AST  23  /  ALT  19  /  AlkPhos  67  07-09    CAPILLARY BLOOD GLUCOSE  197 (11 Jul 2017 11:53)  133 (11 Jul 2017 07:45)  169 (10 Jul 2017 21:11)  185 (10 Jul 2017 16:14)              D DImer  Serum Pro-Brain Natriuretic Peptide: [id number] pg/mL (07-09 @ 14:22)      Studies  Chest X-RAY  CT SCAN Chest < from: Xray Chest 1 View AP- PORTABLE-Urgent (07.09.17 @ 01:48) >    EXAM:  RAD CHEST PORTABLE URGENT        PROCEDURE DATE:  Jul 9 2017         INTERPRETATION:  CLINICAL INFORMATION: Evaluate for pneumonia. Fever.    TECHNIQUE: AP view of the chest.    COMPARISON: Chest x-ray 5/23/2017    FINDINGS:     Small left pleural effusion. Mild pulmonary edema. No pneumothorax.  Status post sternotomy and CABG. Heart size cannot be evaluated..  The visualized osseous structures are unremarkable for age.    IMPRESSION:     Small left pleural effusion and mild pulmonary edema.    < end of copied text >    CT Abdomen  Venous Dopplers: LE:   Others    < from: Transthoracic Echocardiogram (07.10.17 @ 11:34) >  CONCLUSIONS:  1. Mitral annular calcification, otherwise normal mitral  valve. Mild mitral regurgitation.  2. Aortic valve leaflet morphology not well visualized.  The valve is calcified. Peak transaortic valve gradient  equals 24 mm Hg, mean transaortic valve gradient equals 11  mm Hg.  In the setting of significant LV systolic  dysfunction, these gradients may reflect significant  underlying aortic stenosis.  However, unable to accurately  estimate the aortic valve area.  3. Severely dilated left atrium.  LA volume index = 52  cc/m2.  4. Mild left ventricular enlargement.  5. Severe global left ventricular systolic dysfunction.  6. Normal right ventricular size with decreased right  ventricular systolic function.  7. Estimated right ventricular systolic pressure equals 63  mm Hg, assuming right atrial pressure equals 10 mm Hg,  consistent with severe pulmonary hypertension.  Consider MAYELIN for further evaluation of the aortic valve, if  clinically indicated.    < end of copied text > History is difficult to obtain: spoke to NP on floor: Pulmonary called for left sided effusion.     Pt seems comfortable. in no acute respiratory distress overnight in the hospital. 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  Pt has a home health aide 24 hours per day, 7 days per week. Pt is a poor historian; no one at bedside to obtain collateral information, including HHA. Pt does not remember why he is here; he states that he thinks it is because his sugar is high. According to triage and ED provider note, pt had an unwitnessed fall at home and was found by home health aide. Pt does not remember this at all. Unable to obtain elaborate ROS. Pt only admits to dry cough but denies other complaints, including fever, chills, headache, dizziness, chest pain, shortness of breath, abdominal pain, N/V/D/C, hematochezia, melena. Upon arrival to ED, EKG: NSR at 77 bpm with LAD, RBBB. CE x1: Negative. CXR: Small left pleural effusion and mild pulmonary edema. CT head and C-spine: No intracranial hemorrhage or mass effect. Chronic changes as described including large chronic infarcts. No acute cervical spine fracture. Degenerative changes as described. H/H: 10.9/33.9. Platelets: 141. Glucose: 209. Pt was admitted to telemetry. (09 Jul 2017 09:53)      ?FOLLOWING PRESENT  [? ] Hx of PE/DVT, [? ] Hx COPD, [? ] Hx of Asthma, [ ?] Hx of Hospitalization, [? ]  Hx of BiPAP/CPAP use, [ ]? Hx of ELEAZAR    Allergies    No Known Allergies    Intolerances        PAST MEDICAL & SURGICAL HISTORY:  Dementia  Pre-diabetes  HLD (hyperlipidemia)  HTN (hypertension)  CHF (congestive heart failure)  AS (aortic stenosis)  GERD (gastroesophageal reflux disease)  CVA (cerebral vascular accident): bedbound  CAD (coronary artery disease): S/P CABG  Carotid stenosis: S/P CEA  PAD (peripheral artery disease): S/P right AKA  S/P carotid endarterectomy  S/P AKA (above knee amputation) unilateral, right  S/P CABG X 4: 1998      FAMILY HISTORY:  No pertinent family history in first degree relatives      Social History: [ ? ] TOBACCO                  [ ? ] ETOH                                 [ ? ] IVDA/DRUGS    REVIEW OF SYSTEMS      General:weak	    Skin/Breast:x  	  Ophthalmologic:x  	  ENMT:	x    Respiratory and Thorax: mild sob   	  Cardiovascular:	x    Gastrointestinal:	x    Genitourinary:	x    Musculoskeletal:x	    Neurological:	x    Psychiatric:	x    Hematology/Lymphatics:x	    Endocrine:	x    Allergic/Immunologic:	x    MEDICATIONS  (STANDING):  aspirin  chewable 81 milliGRAM(s) Oral daily  lisinopril 2.5 milliGRAM(s) Oral daily  gabapentin 300 milliGRAM(s) Oral daily  citalopram 10 milliGRAM(s) Oral daily  simvastatin 20 milliGRAM(s) Oral at bedtime  risperiDONE   Tablet 0.5 milliGRAM(s) Oral daily  risperiDONE   Tablet 1 milliGRAM(s) Oral at bedtime  metoprolol 25 milliGRAM(s) Oral two times a day  lidocaine   Patch 2 Patch Transdermal daily  nystatin Powder 1 Application(s) Topical three times a day  famotidine    Tablet 20 milliGRAM(s) Oral two times a day  pantoprazole    Tablet 40 milliGRAM(s) Oral before breakfast  enoxaparin Injectable 40 milliGRAM(s) SubCutaneous every 24 hours  insulin lispro (HumaLOG) corrective regimen sliding scale   SubCutaneous three times a day before meals  insulin lispro (HumaLOG) corrective regimen sliding scale   SubCutaneous at bedtime  dextrose 5%. 1000 milliLiter(s) (50 mL/Hr) IV Continuous <Continuous>  dextrose 50% Injectable 12.5 Gram(s) IV Push once  dextrose 50% Injectable 25 Gram(s) IV Push once  dextrose 50% Injectable 25 Gram(s) IV Push once  furosemide    Tablet 40 milliGRAM(s) Oral daily    MEDICATIONS  (PRN):  lidocaine/prilocaine Cream 1 Application(s) Topical every 3 hours PRN Topical moderate to severe pain  senna 2 Tablet(s) Oral at bedtime PRN Constipation  dextrose Gel 1 Dose(s) Oral once PRN Blood Glucose LESS THAN 70 milliGRAM(s)/deciliter  glucagon  Injectable 1 milliGRAM(s) IntraMuscular once PRN Glucose LESS THAN 70 milligrams/deciliter  acetaminophen   Tablet. 650 milliGRAM(s) Oral every 6 hours PRN moderate to sever pain       Vital Signs Last 24 Hrs  T(C): 36.7 (11 Jul 2017 13:18), Max: 36.8 (10 Jul 2017 17:38)  T(F): 98 (11 Jul 2017 13:18), Max: 98.3 (10 Jul 2017 17:38)  HR: 67 (11 Jul 2017 13:18) (67 - 82)  BP: 148/58 (11 Jul 2017 13:18) (148/58 - 160/74)  BP(mean): --  RR: 18 (11 Jul 2017 13:18) (18 - 18)  SpO2: 98% (11 Jul 2017 13:18) (97% - 98%)        I&O's Summary    11 Jul 2017 07:01  -  11 Jul 2017 13:51  --------------------------------------------------------  IN: 474 mL / OUT: 0 mL / NET: 474 mL        Physical Exam:   GENERAL: NAD, well-groomed, well-developed  HEENT: KRISSY/   Atraumatic, Normocephalic  ENMT: No tonsillar erythema, exudates, or enlargement; Moist mucous membranes, Good dentition, No lesions  NECK: Supple, No JVD, Normal thyroid  CHEST/LUNG: Clear to percussion bilaterally; No rales, rhonchi, wheezing, or rubs  CVS: Regular rate and rhythm; No murmurs, rubs, or gallops  GI: : Soft, Nontender, Nondistended; Bowel sounds present  NERVOUS SYSTEM:  Alert & Oriented X3, Good concentration; Motor Strength 5/5 B/L upper and lower extremities; DTRs 2+ intact and symmetric  EXTREMITIES:  Rt AKA  LYMPH: No lymphadenopathy noted  SKIN: No rashes or lesions  ENDOCRINOLOGY: No Thyromegaly  PSYCH: calm    Labs:    CARDIAC MARKERS ( 09 Jul 2017 14:22 )  x     / < 0.06 ng/mL / 75 u/L / 2.20 ng/mL / x                                10.6   5.12  )-----------( 151      ( 11 Jul 2017 05:30 )             32.8                         10.8   5.92  )-----------( 150      ( 10 Jul 2017 06:00 )             33.0                         10.9   8.40  )-----------( 141      ( 09 Jul 2017 01:33 )             33.9     07-11    141  |  104  |  18  ----------------------------<  128<H>  4.0   |  24  |  0.75  07-10    145  |  104  |  13  ----------------------------<  133<H>  4.0   |  27  |  0.65  07-09    144  |  104  |  19  ----------------------------<  209<H>  4.1   |  22  |  0.75    Ca    9.2      11 Jul 2017 05:30  Ca    8.9      10 Jul 2017 06:00  Mg     1.9     07-10    TPro  6.1  /  Alb  3.6  /  TBili  0.6  /  DBili  x   /  AST  23  /  ALT  19  /  AlkPhos  67  07-09    CAPILLARY BLOOD GLUCOSE  197 (11 Jul 2017 11:53)  133 (11 Jul 2017 07:45)  169 (10 Jul 2017 21:11)  185 (10 Jul 2017 16:14)              D DImer  Serum Pro-Brain Natriuretic Peptide: [id number] pg/mL (07-09 @ 14:22)      Studies  Chest X-RAY  CT SCAN Chest < from: Xray Chest 1 View AP- PORTABLE-Urgent (07.09.17 @ 01:48) >    EXAM:  RAD CHEST PORTABLE URGENT        PROCEDURE DATE:  Jul 9 2017         INTERPRETATION:  CLINICAL INFORMATION: Evaluate for pneumonia. Fever.    TECHNIQUE: AP view of the chest.    COMPARISON: Chest x-ray 5/23/2017    FINDINGS:     Small left pleural effusion. Mild pulmonary edema. No pneumothorax.  Status post sternotomy and CABG. Heart size cannot be evaluated..  The visualized osseous structures are unremarkable for age.    IMPRESSION:     Small left pleural effusion and mild pulmonary edema.    < end of copied text >    CT Abdomen  Venous Dopplers: LE:   Others    < from: Transthoracic Echocardiogram (07.10.17 @ 11:34) >  CONCLUSIONS:  1. Mitral annular calcification, otherwise normal mitral  valve. Mild mitral regurgitation.  2. Aortic valve leaflet morphology not well visualized.  The valve is calcified. Peak transaortic valve gradient  equals 24 mm Hg, mean transaortic valve gradient equals 11  mm Hg.  In the setting of significant LV systolic  dysfunction, these gradients may reflect significant  underlying aortic stenosis.  However, unable to accurately  estimate the aortic valve area.  3. Severely dilated left atrium.  LA volume index = 52  cc/m2.  4. Mild left ventricular enlargement.  5. Severe global left ventricular systolic dysfunction.  6. Normal right ventricular size with decreased right  ventricular systolic function.  7. Estimated right ventricular systolic pressure equals 63  mm Hg, assuming right atrial pressure equals 10 mm Hg,  consistent with severe pulmonary hypertension.  Consider MAYELIN for further evaluation of the aortic valve, if  clinically indicated.    < end of copied text >

## 2017-07-11 NOTE — CONSULT NOTE ADULT - PROBLEM SELECTOR PROBLEM 2
Acute on chronic systolic (congestive) heart failure
Chronic congestive heart failure, unspecified congestive heart failure type

## 2017-07-11 NOTE — PROGRESS NOTE ADULT - SUBJECTIVE AND OBJECTIVE BOX
Patient is a 82y old  Male who presents with a chief complaint of pt stated, ' my sugar was out of wack.' (09 Jul 2017 16:36)      HPI:      Pt seen and examined, no events noted, no new complaints at  this time. No back pain  Vital Signs Last 24 Hrs  T(C): 36.7 (11 Jul 2017 13:18), Max: 36.8 (10 Jul 2017 17:38)  T(F): 98 (11 Jul 2017 13:18), Max: 98.3 (10 Jul 2017 17:38)  HR: 67 (11 Jul 2017 13:18) (67 - 82)  BP: 148/58 (11 Jul 2017 13:18) (148/58 - 160/74)  BP(mean): --  RR: 18 (11 Jul 2017 13:18) (18 - 18)  SpO2: 98% (11 Jul 2017 13:18) (97% - 98%)    Physical Exam    Mental Status- awake,alert,speech slow  CN- 2-12 grossly intact  Motor- decreased mvt bilaterally  Sensory- intact Lt  Coordination- unreliable  Gait- deferred

## 2017-07-11 NOTE — CONSULT NOTE ADULT - PROBLEM SELECTOR RECOMMENDATION 9
Monitor renal function.  Consider diuresis.  Decision to be made by cardiology
I think patients mild cough and left sided pleural effusion is secondary to ischemic cardiomyopathy. AT THIS TIMER, WOULD RECOMMEND INCREASING LASIX TO 40 MG A DAY , IF IT IS OK WITH CARDIOLOGY. The pleural effusion is small, and there is no need to tap it: No indication of pneumonia on chest x-ray and patient has been afebrile.

## 2017-07-12 RX ORDER — LISINOPRIL 2.5 MG/1
1 TABLET ORAL
Qty: 0 | Refills: 0 | COMMUNITY
Start: 2017-07-12

## 2017-07-12 RX ORDER — PANTOPRAZOLE SODIUM 20 MG/1
1 TABLET, DELAYED RELEASE ORAL
Qty: 0 | Refills: 0 | COMMUNITY
Start: 2017-07-12

## 2017-07-12 RX ORDER — SIMVASTATIN 20 MG/1
1 TABLET, FILM COATED ORAL
Qty: 0 | Refills: 0 | COMMUNITY
Start: 2017-07-12

## 2017-07-12 RX ORDER — GABAPENTIN 400 MG/1
1 CAPSULE ORAL
Qty: 0 | Refills: 0 | COMMUNITY
Start: 2017-07-12

## 2017-07-12 RX ORDER — RISPERIDONE 4 MG/1
1 TABLET ORAL
Qty: 0 | Refills: 0 | COMMUNITY
Start: 2017-07-12

## 2017-07-12 RX ORDER — METOPROLOL TARTRATE 50 MG
1 TABLET ORAL
Qty: 0 | Refills: 0 | COMMUNITY
Start: 2017-07-12

## 2017-07-12 RX ORDER — NYSTATIN CREAM 100000 [USP'U]/G
1 CREAM TOPICAL
Qty: 0 | Refills: 0 | COMMUNITY
Start: 2017-07-12

## 2017-07-12 RX ORDER — SENNA PLUS 8.6 MG/1
2 TABLET ORAL
Qty: 0 | Refills: 0 | COMMUNITY
Start: 2017-07-12

## 2017-07-12 RX ORDER — CITALOPRAM 10 MG/1
1 TABLET, FILM COATED ORAL
Qty: 0 | Refills: 0 | COMMUNITY
Start: 2017-07-12

## 2017-07-12 RX ORDER — FUROSEMIDE 40 MG
1 TABLET ORAL
Qty: 30 | Refills: 0 | OUTPATIENT
Start: 2017-07-12 | End: 2017-08-11

## 2017-07-12 RX ORDER — FAMOTIDINE 10 MG/ML
1 INJECTION INTRAVENOUS
Qty: 0 | Refills: 0 | COMMUNITY
Start: 2017-07-12

## 2017-07-12 RX ORDER — ACETAMINOPHEN 500 MG
2 TABLET ORAL
Qty: 0 | Refills: 0 | COMMUNITY
Start: 2017-07-12

## 2017-07-12 RX ORDER — ASPIRIN/CALCIUM CARB/MAGNESIUM 324 MG
1 TABLET ORAL
Qty: 0 | Refills: 0 | COMMUNITY
Start: 2017-07-12

## 2017-07-12 RX ORDER — METOPROLOL TARTRATE 50 MG
0.5 TABLET ORAL
Qty: 0 | Refills: 0 | COMMUNITY
Start: 2017-07-12

## 2017-07-12 RX ADMIN — Medication 650 MILLIGRAM(S): at 12:50

## 2017-07-12 RX ADMIN — GABAPENTIN 300 MILLIGRAM(S): 400 CAPSULE ORAL at 11:36

## 2017-07-12 RX ADMIN — SIMVASTATIN 20 MILLIGRAM(S): 20 TABLET, FILM COATED ORAL at 22:30

## 2017-07-12 RX ADMIN — Medication 25 MILLIGRAM(S): at 17:15

## 2017-07-12 RX ADMIN — RISPERIDONE 0.5 MILLIGRAM(S): 4 TABLET ORAL at 11:36

## 2017-07-12 RX ADMIN — PANTOPRAZOLE SODIUM 40 MILLIGRAM(S): 20 TABLET, DELAYED RELEASE ORAL at 06:25

## 2017-07-12 RX ADMIN — LIDOCAINE 2 PATCH: 4 CREAM TOPICAL at 00:03

## 2017-07-12 RX ADMIN — RISPERIDONE 1 MILLIGRAM(S): 4 TABLET ORAL at 22:26

## 2017-07-12 RX ADMIN — Medication 650 MILLIGRAM(S): at 13:37

## 2017-07-12 RX ADMIN — Medication 25 MILLIGRAM(S): at 06:25

## 2017-07-12 RX ADMIN — Medication 650 MILLIGRAM(S): at 23:26

## 2017-07-12 RX ADMIN — LIDOCAINE 2 PATCH: 4 CREAM TOPICAL at 11:36

## 2017-07-12 RX ADMIN — NYSTATIN CREAM 1 APPLICATION(S): 100000 CREAM TOPICAL at 11:36

## 2017-07-12 RX ADMIN — ENOXAPARIN SODIUM 40 MILLIGRAM(S): 100 INJECTION SUBCUTANEOUS at 11:36

## 2017-07-12 RX ADMIN — NYSTATIN CREAM 1 APPLICATION(S): 100000 CREAM TOPICAL at 06:25

## 2017-07-12 RX ADMIN — FAMOTIDINE 20 MILLIGRAM(S): 10 INJECTION INTRAVENOUS at 17:15

## 2017-07-12 RX ADMIN — CITALOPRAM 10 MILLIGRAM(S): 10 TABLET, FILM COATED ORAL at 11:36

## 2017-07-12 RX ADMIN — Medication 650 MILLIGRAM(S): at 22:25

## 2017-07-12 RX ADMIN — Medication 1: at 11:36

## 2017-07-12 RX ADMIN — NYSTATIN CREAM 1 APPLICATION(S): 100000 CREAM TOPICAL at 22:28

## 2017-07-12 RX ADMIN — LIDOCAINE 2 PATCH: 4 CREAM TOPICAL at 23:05

## 2017-07-12 RX ADMIN — LISINOPRIL 2.5 MILLIGRAM(S): 2.5 TABLET ORAL at 06:25

## 2017-07-12 RX ADMIN — FAMOTIDINE 20 MILLIGRAM(S): 10 INJECTION INTRAVENOUS at 06:25

## 2017-07-12 RX ADMIN — Medication 40 MILLIGRAM(S): at 06:25

## 2017-07-12 RX ADMIN — Medication 81 MILLIGRAM(S): at 11:36

## 2017-07-12 NOTE — PROGRESS NOTE ADULT - SUBJECTIVE AND OBJECTIVE BOX
Patient is a 82y old  Male who presents with a chief complaint of pt stated, ' my sugar was out of wack.' (09 Jul 2017 16:36)    pt has been breathing ok     no events overnight        Any change in ROS:     MEDICATIONS  (STANDING):  aspirin  chewable 81 milliGRAM(s) Oral daily  lisinopril 2.5 milliGRAM(s) Oral daily  gabapentin 300 milliGRAM(s) Oral daily  citalopram 10 milliGRAM(s) Oral daily  simvastatin 20 milliGRAM(s) Oral at bedtime  risperiDONE   Tablet 0.5 milliGRAM(s) Oral daily  risperiDONE   Tablet 1 milliGRAM(s) Oral at bedtime  metoprolol 25 milliGRAM(s) Oral two times a day  lidocaine   Patch 2 Patch Transdermal daily  nystatin Powder 1 Application(s) Topical three times a day  famotidine    Tablet 20 milliGRAM(s) Oral two times a day  pantoprazole    Tablet 40 milliGRAM(s) Oral before breakfast  enoxaparin Injectable 40 milliGRAM(s) SubCutaneous every 24 hours  insulin lispro (HumaLOG) corrective regimen sliding scale   SubCutaneous three times a day before meals  insulin lispro (HumaLOG) corrective regimen sliding scale   SubCutaneous at bedtime  dextrose 5%. 1000 milliLiter(s) (50 mL/Hr) IV Continuous <Continuous>  dextrose 50% Injectable 12.5 Gram(s) IV Push once  dextrose 50% Injectable 25 Gram(s) IV Push once  dextrose 50% Injectable 25 Gram(s) IV Push once  furosemide    Tablet 40 milliGRAM(s) Oral daily    MEDICATIONS  (PRN):  lidocaine/prilocaine Cream 1 Application(s) Topical every 3 hours PRN Topical moderate to severe pain  senna 2 Tablet(s) Oral at bedtime PRN Constipation  dextrose Gel 1 Dose(s) Oral once PRN Blood Glucose LESS THAN 70 milliGRAM(s)/deciliter  glucagon  Injectable 1 milliGRAM(s) IntraMuscular once PRN Glucose LESS THAN 70 milligrams/deciliter  acetaminophen   Tablet. 650 milliGRAM(s) Oral every 6 hours PRN moderate to sever pain  haloperidol     Tablet 1 milliGRAM(s) Oral every 6 hours PRN agitaion    Vital Signs Last 24 Hrs  T(C): 36.8 (12 Jul 2017 14:11), Max: 36.9 (12 Jul 2017 06:23)  T(F): 98.2 (12 Jul 2017 14:11), Max: 98.5 (12 Jul 2017 06:23)  HR: 66 (12 Jul 2017 14:11) (65 - 79)  BP: 152/67 (12 Jul 2017 06:23) (140/62 - 152/67)  BP(mean): --  RR: 17 (12 Jul 2017 14:11) (16 - 17)  SpO2: 95% (12 Jul 2017 14:11) (95% - 99%)    I&O's Summary    11 Jul 2017 07:01  -  12 Jul 2017 07:00  --------------------------------------------------------  IN: 1061 mL / OUT: 90 mL / NET: 971 mL          Physical Exam:   GENERAL: NAD, well-groomed, well-developed  HEENT: KRISSY/   Atraumatic, Normocephalic  ENMT: No tonsillar erythema, exudates, or enlargement; Moist mucous membranes, Good dentition, No lesions  NECK: Supple, No JVD, Normal thyroid  CHEST/LUNG: mostly clear   CVS: Regular rate and rhythm; No murmurs, rubs, or gallops  GI: : Soft, Nontender, Nondistended; Bowel sounds present  NERVOUS SYSTEM:  Alert & Oriented X0  EXTREMITIES: Rt AKA  LYMPH: No lymphadenopathy noted  SKIN: No rashes or lesions  ENDOCRINOLOGY: No Thyromegaly  PSYCH: dementia     Labs:                        10.6   5.12  )-----------( 151      ( 11 Jul 2017 05:30 )             32.8                         10.8   5.92  )-----------( 150      ( 10 Jul 2017 06:00 )             33.0                         10.9   8.40  )-----------( 141      ( 09 Jul 2017 01:33 )             33.9     07-11    141  |  104  |  18  ----------------------------<  128<H>  4.0   |  24  |  0.75  07-10    145  |  104  |  13  ----------------------------<  133<H>  4.0   |  27  |  0.65  07-09    144  |  104  |  19  ----------------------------<  209<H>  4.1   |  22  |  0.75    Ca    9.2      11 Jul 2017 05:30    TPro  6.1  /  Alb  3.6  /  TBili  0.6  /  DBili  x   /  AST  23  /  ALT  19  /  AlkPhos  67  07-09    CAPILLARY BLOOD GLUCOSE  160 (12 Jul 2017 11:26)  131 (12 Jul 2017 07:30)  144 (11 Jul 2017 22:55)  142 (11 Jul 2017 16:14)      Studies  Chest X-RAY  CT SCAN Chest < from: CT Thoracic Spine No Cont (07.10.17 @ 12:15) >  IMPRESSION:    Thoracic spine:  No acute fracture.    Partially imaged moderate bilateral pleural effusions, with findings not   well appreciated on chest radiograph of 7/9/17, if clinically warranted a   chest CT can be performed for further characterization.    Mild multilevel degenerative disc disease.    Lumbar spine:    No acute fracture.    L5-S1: Stable grade 1 spondylolisthesis of L5 upon S1, with chronic L5   bilateral pars interarticularis defects. Severe degenerative disc disease.    < end of copied text >    Venous Dopplers: LE:   CT Abdomen  Others

## 2017-07-12 NOTE — PROGRESS NOTE ADULT - ASSESSMENT
83 y/o male with CAD/ CABG, ICMP/Sys HF , CVA, moderate AS, PAD S/P right AKA, carotid stenosis S/P CEA, HTN,  admitted with s/p fall     1. cv stable no chest pain or sob.    2. S/p  fall likely mechanical   pt c/s   head ct negative    3. Acute on chronic Sys HF  known ICMP/ sev LV sys HF, not candidate for AICD secondary to advance dementia  continue with  lasix 20 mg po daily   monitor i/o / bmp   continue with acei/ BB    4. Mod- Sev AS : known   continue to monitor     5. HLD : continue with statin     6. DVT ppx 81 y/o male with CAD/ CABG, ICMP/Sys HF , CVA, moderate AS, PAD S/P right AKA, carotid stenosis S/P CEA, HTN,  admitted with s/p fall     1. cv stable no chest pain or sob.    2. S/p  fall likely mechanical   pt c/s   head ct negative    3. Acute on chronic Sys HF  known ICMP/ sev LV sys HF, not candidate for AICD secondary to advance dementia  continue with  lasix 40 mg po daily   monitor i/o / bmp   continue with acei/ BB    4. Mod- Sev AS : known   continue to monitor     5. HLD : continue with statin     6. DVT ppx

## 2017-07-12 NOTE — PROGRESS NOTE ADULT - PROBLEM SELECTOR PLAN 1
is likely due to interstitial pulmonary edema: The lasix was increased to 40 mg a day, yesterday: pt seems to be tolerating at this time.

## 2017-07-12 NOTE — PROGRESS NOTE ADULT - ASSESSMENT
83 yo male PMHx of CVA with R sided paralysis, Dementia, CAD, CABG, Systolic HF, HTN, HLD and DM p/w worsening confusion, slurred speech and R shoulder pain x 2 weeks, found in JULIO and Hyperkalemia.    . Cough  Pulmonary fu      ·  Problem: Hyperkalemia.  Plan: tele monitor  resolved  s/p IV fluids hydration  recheck BMP      ·  Problem: JULIO (acute kidney injury).  Plan: Monitor electrolytes  Trend BUN/Cr  Hold Nephrotoxic meds      ·  Problem: Confusion.  Plan Aspiration and Falls precautions

## 2017-07-12 NOTE — PROGRESS NOTE ADULT - SUBJECTIVE AND OBJECTIVE BOX
CARDIOLOGY FOLLOW UP     CC no acute events noted        PHYSICAL EXAM:  T(C): 36.8 (07-12-17 @ 14:11), Max: 36.9 (07-12-17 @ 06:23)  HR: 66 (07-12-17 @ 14:11) (65 - 79)  BP: 152/67 (07-12-17 @ 06:23) (140/62 - 152/67)  RR: 17 (07-12-17 @ 14:11) (16 - 17)  SpO2: 95% (07-12-17 @ 14:11) (95% - 99%)  Wt(kg): --  I&O's Summary    11 Jul 2017 07:01  -  12 Jul 2017 07:00  --------------------------------------------------------  IN: 1061 mL / OUT: 90 mL / NET: 971 mL          Appearance: Normal	  Cardiovascular: Normal S1 S2,RRR, No JVD, + sys murmur   Respiratory: Lungs clear to auscultation,  decrease BS at base  Gastrointestinal:  Soft, Non-tender, + BS	  Extremities: R AKA, No edema        MEDICATIONS  (STANDING):  aspirin  chewable 81 milliGRAM(s) Oral daily  lisinopril 2.5 milliGRAM(s) Oral daily  gabapentin 300 milliGRAM(s) Oral daily  citalopram 10 milliGRAM(s) Oral daily  simvastatin 20 milliGRAM(s) Oral at bedtime  risperiDONE   Tablet 0.5 milliGRAM(s) Oral daily  risperiDONE   Tablet 1 milliGRAM(s) Oral at bedtime  metoprolol 25 milliGRAM(s) Oral two times a day  lidocaine   Patch 2 Patch Transdermal daily  nystatin Powder 1 Application(s) Topical three times a day  famotidine    Tablet 20 milliGRAM(s) Oral two times a day  pantoprazole    Tablet 40 milliGRAM(s) Oral before breakfast  enoxaparin Injectable 40 milliGRAM(s) SubCutaneous every 24 hours  insulin lispro (HumaLOG) corrective regimen sliding scale   SubCutaneous three times a day before meals  insulin lispro (HumaLOG) corrective regimen sliding scale   SubCutaneous at bedtime  dextrose 5%. 1000 milliLiter(s) (50 mL/Hr) IV Continuous <Continuous>  dextrose 50% Injectable 12.5 Gram(s) IV Push once  dextrose 50% Injectable 25 Gram(s) IV Push once  dextrose 50% Injectable 25 Gram(s) IV Push once  furosemide    Tablet 40 milliGRAM(s) Oral daily      TELEMETRY: 	  NSR pvc   brief pauses noted < 2 sec     LABS:	 	                                10.6   5.12  )-----------( 151      ( 11 Jul 2017 05:30 )             32.8     07-11    141  |  104  |  18  ----------------------------<  128<H>  4.0   |  24  |  0.75    Ca    9.2      11 Jul 2017 05:30

## 2017-07-12 NOTE — PROGRESS NOTE ADULT - SUBJECTIVE AND OBJECTIVE BOX
Patient is a 82y old  Male who presents with a chief complaint of pt stated, ' my sugar was out of wack.' (09 Jul 2017 16:36)  NAD      INTERVAL HPI/OVERNIGHT EVENTS:  T(C): 36.8 (07-12-17 @ 14:11), Max: 36.9 (07-12-17 @ 06:23)  HR: 66 (07-12-17 @ 14:11) (65 - 79)  BP: 152/67 (07-12-17 @ 06:23) (140/62 - 152/67)  RR: 17 (07-12-17 @ 14:11) (16 - 17)  SpO2: 95% (07-12-17 @ 14:11) (95% - 99%)  Wt(kg): --  I&O's Summary    11 Jul 2017 07:01  -  12 Jul 2017 07:00  --------------------------------------------------------  IN: 1061 mL / OUT: 90 mL / NET: 971 mL        LABS:                        10.6   5.12  )-----------( 151      ( 11 Jul 2017 05:30 )             32.8     07-11    141  |  104  |  18  ----------------------------<  128<H>  4.0   |  24  |  0.75    Ca    9.2      11 Jul 2017 05:30          CAPILLARY BLOOD GLUCOSE  160 (12 Jul 2017 11:26)  131 (12 Jul 2017 07:30)  144 (11 Jul 2017 22:55)  142 (11 Jul 2017 16:14)                MEDICATIONS  (STANDING):  aspirin  chewable 81 milliGRAM(s) Oral daily  lisinopril 2.5 milliGRAM(s) Oral daily  gabapentin 300 milliGRAM(s) Oral daily  citalopram 10 milliGRAM(s) Oral daily  simvastatin 20 milliGRAM(s) Oral at bedtime  risperiDONE   Tablet 0.5 milliGRAM(s) Oral daily  risperiDONE   Tablet 1 milliGRAM(s) Oral at bedtime  metoprolol 25 milliGRAM(s) Oral two times a day  lidocaine   Patch 2 Patch Transdermal daily  nystatin Powder 1 Application(s) Topical three times a day  famotidine    Tablet 20 milliGRAM(s) Oral two times a day  pantoprazole    Tablet 40 milliGRAM(s) Oral before breakfast  enoxaparin Injectable 40 milliGRAM(s) SubCutaneous every 24 hours  insulin lispro (HumaLOG) corrective regimen sliding scale   SubCutaneous three times a day before meals  insulin lispro (HumaLOG) corrective regimen sliding scale   SubCutaneous at bedtime  dextrose 5%. 1000 milliLiter(s) (50 mL/Hr) IV Continuous <Continuous>  dextrose 50% Injectable 12.5 Gram(s) IV Push once  dextrose 50% Injectable 25 Gram(s) IV Push once  dextrose 50% Injectable 25 Gram(s) IV Push once  furosemide    Tablet 40 milliGRAM(s) Oral daily    MEDICATIONS  (PRN):  lidocaine/prilocaine Cream 1 Application(s) Topical every 3 hours PRN Topical moderate to severe pain  senna 2 Tablet(s) Oral at bedtime PRN Constipation  dextrose Gel 1 Dose(s) Oral once PRN Blood Glucose LESS THAN 70 milliGRAM(s)/deciliter  glucagon  Injectable 1 milliGRAM(s) IntraMuscular once PRN Glucose LESS THAN 70 milligrams/deciliter  acetaminophen   Tablet. 650 milliGRAM(s) Oral every 6 hours PRN moderate to sever pain  haloperidol     Tablet 1 milliGRAM(s) Oral every 6 hours PRN agitaion          PHYSICAL EXAM:  GENERAL: NAD, well-groomed, well-developed  HEAD:  Atraumatic, Normocephalic  CHEST/LUNG: Clear to percussion bilaterally; No rales, rhonchi, wheezing, or rubs  HEART: Regular rate and rhythm; No murmurs, rubs, or gallops  ABDOMEN: Soft, Nontender, Nondistended; Bowel sounds present  EXTREMITIES:  2+ Peripheral Pulses, No clubbing, cyanosis, or edema  LYMPH: No lymphadenopathy noted  SKIN: No rashes or lesions    Care Discussed with Consultants/Other Providers [ ] YES  [ ] NO

## 2017-07-12 NOTE — PHYSICAL THERAPY INITIAL EVALUATION ADULT - RANGE OF MOTION EXAMINATION, REHAB EVAL
bilateral lower extremity ROM was WFL (within functional limits)/bilateral upper extremity ROM was WFL (within functional limits)/deficits as listed below/ex. R AKA

## 2017-07-13 VITALS
RESPIRATION RATE: 18 BRPM | OXYGEN SATURATION: 97 % | TEMPERATURE: 98 F | HEART RATE: 85 BPM | DIASTOLIC BLOOD PRESSURE: 68 MMHG | SYSTOLIC BLOOD PRESSURE: 140 MMHG

## 2017-07-13 RX ADMIN — RISPERIDONE 0.5 MILLIGRAM(S): 4 TABLET ORAL at 12:56

## 2017-07-13 RX ADMIN — Medication 81 MILLIGRAM(S): at 12:56

## 2017-07-13 RX ADMIN — PANTOPRAZOLE SODIUM 40 MILLIGRAM(S): 20 TABLET, DELAYED RELEASE ORAL at 06:34

## 2017-07-13 RX ADMIN — NYSTATIN CREAM 1 APPLICATION(S): 100000 CREAM TOPICAL at 06:34

## 2017-07-13 RX ADMIN — FAMOTIDINE 20 MILLIGRAM(S): 10 INJECTION INTRAVENOUS at 06:34

## 2017-07-13 RX ADMIN — Medication 2: at 12:55

## 2017-07-13 RX ADMIN — Medication 25 MILLIGRAM(S): at 09:36

## 2017-07-13 RX ADMIN — CITALOPRAM 10 MILLIGRAM(S): 10 TABLET, FILM COATED ORAL at 12:56

## 2017-07-13 RX ADMIN — Medication 650 MILLIGRAM(S): at 10:48

## 2017-07-13 RX ADMIN — Medication 40 MILLIGRAM(S): at 09:36

## 2017-07-13 RX ADMIN — ENOXAPARIN SODIUM 40 MILLIGRAM(S): 100 INJECTION SUBCUTANEOUS at 12:56

## 2017-07-13 RX ADMIN — Medication 650 MILLIGRAM(S): at 11:30

## 2017-07-13 RX ADMIN — LISINOPRIL 2.5 MILLIGRAM(S): 2.5 TABLET ORAL at 09:36

## 2017-07-13 RX ADMIN — GABAPENTIN 300 MILLIGRAM(S): 400 CAPSULE ORAL at 12:56

## 2017-07-13 NOTE — PROGRESS NOTE ADULT - ASSESSMENT
83 yo male PMHx of CVA with R sided paralysis, Dementia, CAD, CABG, Systolic HF, HTN, HLD and DM p/w worsening confusion, slurred speech and R shoulder pain x 2 weeks, found in JULIO and Hyperkalemia.    . Cough  Pulmonary fu      ·  Problem: Hyperkalemia.  Plan: tele monitor  resolved  s/p IV fluids hydration  recheck BMP      ·  Problem: JULIO (acute kidney injury).  Plan: Monitor electrolytes  Trend BUN/Cr  Hold Nephrotoxic meds      ·  Problem: Confusion.  Plan Aspiration and Falls precautions    dc planning

## 2017-07-13 NOTE — PROGRESS NOTE ADULT - SUBJECTIVE AND OBJECTIVE BOX
Patient is a 82y old  Male who presents with a chief complaint of pt stated, ' my sugar was out of wack.' (09 Jul 2017 16:36)      INTERVAL HPI/OVERNIGHT EVENTS:  T(C): 36.8 (07-13-17 @ 06:26), Max: 36.8 (07-12-17 @ 14:11)  HR: 99 (07-13-17 @ 09:36) (66 - 99)  BP: 134/60 (07-13-17 @ 09:36) (134/60 - 142/58)  RR: 17 (07-13-17 @ 06:26) (17 - 17)  SpO2: 97% (07-13-17 @ 06:26) (95% - 97%)  Wt(kg): --  I&O's Summary    12 Jul 2017 07:01  -  13 Jul 2017 07:00  --------------------------------------------------------  IN: 500 mL / OUT: 0 mL / NET: 500 mL        LABS:              CAPILLARY BLOOD GLUCOSE  225 (13 Jul 2017 12:06)  133 (13 Jul 2017 07:23)  169 (12 Jul 2017 22:09)  139 (12 Jul 2017 16:13)                MEDICATIONS  (STANDING):  aspirin  chewable 81 milliGRAM(s) Oral daily  lisinopril 2.5 milliGRAM(s) Oral daily  gabapentin 300 milliGRAM(s) Oral daily  citalopram 10 milliGRAM(s) Oral daily  simvastatin 20 milliGRAM(s) Oral at bedtime  risperiDONE   Tablet 0.5 milliGRAM(s) Oral daily  risperiDONE   Tablet 1 milliGRAM(s) Oral at bedtime  metoprolol 25 milliGRAM(s) Oral two times a day  lidocaine   Patch 2 Patch Transdermal daily  nystatin Powder 1 Application(s) Topical three times a day  famotidine    Tablet 20 milliGRAM(s) Oral two times a day  pantoprazole    Tablet 40 milliGRAM(s) Oral before breakfast  enoxaparin Injectable 40 milliGRAM(s) SubCutaneous every 24 hours  insulin lispro (HumaLOG) corrective regimen sliding scale   SubCutaneous three times a day before meals  insulin lispro (HumaLOG) corrective regimen sliding scale   SubCutaneous at bedtime  dextrose 5%. 1000 milliLiter(s) (50 mL/Hr) IV Continuous <Continuous>  dextrose 50% Injectable 12.5 Gram(s) IV Push once  dextrose 50% Injectable 25 Gram(s) IV Push once  dextrose 50% Injectable 25 Gram(s) IV Push once  furosemide    Tablet 40 milliGRAM(s) Oral daily    MEDICATIONS  (PRN):  lidocaine/prilocaine Cream 1 Application(s) Topical every 3 hours PRN Topical moderate to severe pain  senna 2 Tablet(s) Oral at bedtime PRN Constipation  dextrose Gel 1 Dose(s) Oral once PRN Blood Glucose LESS THAN 70 milliGRAM(s)/deciliter  glucagon  Injectable 1 milliGRAM(s) IntraMuscular once PRN Glucose LESS THAN 70 milligrams/deciliter  acetaminophen   Tablet. 650 milliGRAM(s) Oral every 6 hours PRN moderate to sever pain  haloperidol     Tablet 1 milliGRAM(s) Oral every 6 hours PRN agitaion          PHYSICAL EXAM:  GENERAL: NAD, well-groomed, well-developed  HEAD:  Atraumatic, Normocephalic  CHEST/LUNG: Clear to percussion bilaterally; No rales, rhonchi, wheezing, or rubs  HEART: Regular rate and rhythm; No murmurs, rubs, or gallops  ABDOMEN: Soft, Nontender, Nondistended; Bowel sounds present  EXTREMITIES:  2+ Peripheral Pulses, No clubbing, cyanosis, or edema  LYMPH: No lymphadenopathy noted  SKIN: No rashes or lesions    Care Discussed with Consultants/Other Providers [ ] YES  [ ] NO

## 2017-07-13 NOTE — PROGRESS NOTE ADULT - SUBJECTIVE AND OBJECTIVE BOX
CARDIOLOGY FOLLOW UP     CC      PHYSICAL EXAM:  T(C): 36.8 (07-13-17 @ 06:26), Max: 36.8 (07-12-17 @ 14:11)  HR: 99 (07-13-17 @ 09:36) (66 - 99)  BP: 134/60 (07-13-17 @ 09:36) (134/60 - 142/58)  RR: 17 (07-13-17 @ 06:26) (17 - 17)  SpO2: 97% (07-13-17 @ 06:26) (95% - 97%)  Wt(kg): --  I&O's Summary    12 Jul 2017 07:01  -  13 Jul 2017 07:00  --------------------------------------------------------  IN: 500 mL / OUT: 0 mL / NET: 500 mL        Appearance: Normal	  Cardiovascular: Normal S1 S2,RRR, No JVD, + sys murmur   Respiratory: Lungs clear to auscultation,  decrease BS at base  Gastrointestinal:  Soft, Non-tender, + BS	  Extremities: R AKA, No edema        MEDICATIONS  (STANDING):  aspirin  chewable 81 milliGRAM(s) Oral daily  lisinopril 2.5 milliGRAM(s) Oral daily  gabapentin 300 milliGRAM(s) Oral daily  citalopram 10 milliGRAM(s) Oral daily  simvastatin 20 milliGRAM(s) Oral at bedtime  risperiDONE   Tablet 0.5 milliGRAM(s) Oral daily  risperiDONE   Tablet 1 milliGRAM(s) Oral at bedtime  metoprolol 25 milliGRAM(s) Oral two times a day  lidocaine   Patch 2 Patch Transdermal daily  nystatin Powder 1 Application(s) Topical three times a day  famotidine    Tablet 20 milliGRAM(s) Oral two times a day  pantoprazole    Tablet 40 milliGRAM(s) Oral before breakfast  enoxaparin Injectable 40 milliGRAM(s) SubCutaneous every 24 hours  insulin lispro (HumaLOG) corrective regimen sliding scale   SubCutaneous three times a day before meals  insulin lispro (HumaLOG) corrective regimen sliding scale   SubCutaneous at bedtime  dextrose 5%. 1000 milliLiter(s) (50 mL/Hr) IV Continuous <Continuous>  dextrose 50% Injectable 12.5 Gram(s) IV Push once  dextrose 50% Injectable 25 Gram(s) IV Push once  dextrose 50% Injectable 25 Gram(s) IV Push once  furosemide    Tablet 40 milliGRAM(s) Oral daily      TELEMETRY: SR Second  degree AVB type 1, PVC   brief pauses < 2 sec noted 	      OTHER: 	    LABS: CARDIOLOGY FOLLOW UP     CC no acute events noted       PHYSICAL EXAM:  T(C): 36.8 (07-13-17 @ 06:26), Max: 36.8 (07-12-17 @ 14:11)  HR: 99 (07-13-17 @ 09:36) (66 - 99)  BP: 134/60 (07-13-17 @ 09:36) (134/60 - 142/58)  RR: 17 (07-13-17 @ 06:26) (17 - 17)  SpO2: 97% (07-13-17 @ 06:26) (95% - 97%)  Wt(kg): --  I&O's Summary    12 Jul 2017 07:01  -  13 Jul 2017 07:00  --------------------------------------------------------  IN: 500 mL / OUT: 0 mL / NET: 500 mL        Appearance: Normal	  Cardiovascular: Normal S1 S2,RRR, No JVD, + sys murmur   Respiratory: Lungs clear to auscultation,  diminished at base   Gastrointestinal:  Soft, Non-tender, + BS	  Extremities: R AKA, No edema        MEDICATIONS  (STANDING):  aspirin  chewable 81 milliGRAM(s) Oral daily  lisinopril 2.5 milliGRAM(s) Oral daily  gabapentin 300 milliGRAM(s) Oral daily  citalopram 10 milliGRAM(s) Oral daily  simvastatin 20 milliGRAM(s) Oral at bedtime  risperiDONE   Tablet 0.5 milliGRAM(s) Oral daily  risperiDONE   Tablet 1 milliGRAM(s) Oral at bedtime  metoprolol 25 milliGRAM(s) Oral two times a day  lidocaine   Patch 2 Patch Transdermal daily  nystatin Powder 1 Application(s) Topical three times a day  famotidine    Tablet 20 milliGRAM(s) Oral two times a day  pantoprazole    Tablet 40 milliGRAM(s) Oral before breakfast  enoxaparin Injectable 40 milliGRAM(s) SubCutaneous every 24 hours  insulin lispro (HumaLOG) corrective regimen sliding scale   SubCutaneous three times a day before meals  insulin lispro (HumaLOG) corrective regimen sliding scale   SubCutaneous at bedtime  dextrose 5%. 1000 milliLiter(s) (50 mL/Hr) IV Continuous <Continuous>  dextrose 50% Injectable 12.5 Gram(s) IV Push once  dextrose 50% Injectable 25 Gram(s) IV Push once  dextrose 50% Injectable 25 Gram(s) IV Push once  furosemide    Tablet 40 milliGRAM(s) Oral daily      TELEMETRY: SR Second  degree AVB type 1, PVC   brief pauses < 2 sec noted 	      OTHER: 	    LABS:

## 2017-07-13 NOTE — PROGRESS NOTE ADULT - ASSESSMENT
83 y/o male with CAD/ CABG, ICMP/Sys HF , CVA, moderate AS, PAD S/P right AKA, carotid stenosis S/P CEA, HTN,  admitted with s/p fall     1. cv stable no chest pain or sob.  events on tele noted , asymptomatic   decrease metoprolol to 12.5 PO BID     2. S/p  fall likely mechanical   pt c/s       3. Acute on chronic Sys HF  known ICMP/ sev LV sys HF, not candidate for AICD secondary to advance dementia  continue with  lasix 20 mg po daily   monitor i/o / bmp   continue with acei/ BB    4. Mod- Sev AS : known   continue to monitor     5. HLD : continue with statin     6. DVT ppx 83 y/o male with CAD/ CABG, ICMP/Sys HF , CVA, moderate AS, PAD S/P right AKA, carotid stenosis S/P CEA, HTN,  admitted with s/p fall     1. cv stable no chest pain or sob.  events on tele noted , asymptomatic   no significant bradycardia noted , continue with BB      2. S/p  fall likely mechanical   pt c/s       3. Acute on chronic Sys HF  known ICMP/ sev LV sys HF, not candidate for AICD secondary to advance dementia  overall volume status ok   continue with  lasix daily   monitor i/o / bmp   continue with acei/ BB    4. Mod- Sev AS : known   continue to monitor     5. HLD : continue with statin     6. DVT ppx

## 2017-07-13 NOTE — PROGRESS NOTE ADULT - ATTENDING COMMENTS
Agree with above NP note.    cv stable   known sev icmp  stable  bb/lasix daily   as-monitor   follow daily bmp  no icd sec to adv dementia
Agree with above NP note.      cv stable for d/c

## 2017-07-14 LAB
BACTERIA BLD CULT: SIGNIFICANT CHANGE UP
BACTERIA BLD CULT: SIGNIFICANT CHANGE UP

## 2017-07-23 ENCOUNTER — EMERGENCY (EMERGENCY)
Facility: HOSPITAL | Age: 82
LOS: 1 days | End: 2017-07-23
Attending: EMERGENCY MEDICINE | Admitting: EMERGENCY MEDICINE
Payer: MEDICARE

## 2017-07-23 DIAGNOSIS — Z98.890 OTHER SPECIFIED POSTPROCEDURAL STATES: Chronic | ICD-10-CM

## 2017-07-23 DIAGNOSIS — Z89.611 ACQUIRED ABSENCE OF RIGHT LEG ABOVE KNEE: Chronic | ICD-10-CM

## 2017-07-23 PROCEDURE — 99283 EMERGENCY DEPT VISIT LOW MDM: CPT | Mod: GC

## 2017-07-23 NOTE — ED PROVIDER NOTE - MEDICAL DECISION MAKING DETAILS
82M BIBEMS in cardiac arrest with CPR in progress, unresponsive and in arrest 35-40 minutes prior to arrival, intubated in field. DNR present. S/p amio, epi, mag, calcium in field. No palpable pulses, PEA on monitor, fixed nonreactive pupils, no cardiac activity on bedside sonogram.

## 2017-07-23 NOTE — ED PROVIDER NOTE - OBJECTIVE STATEMENT
82M BIBEMS with CPR in progress s/p cardiac arrest. Pt reported to have just finished dinner, witnessed arrest by home health aide (pt went unresponsive). Has DNR, but unable to find form quickly so CPR initiated and pt intubated in field. Initial rhythm vtach, given 3 shocks, epi x 3, amio 300mg, then amio 150mg, calcium chloride, bicarbonate, 500cc normal saline. Total of 8 shocks. DNR found while CPR in progress, but per telemetry doc unable to stop resuscitation once initiated so transported to hospital. Son (Ervin Jean) aware; attempted to contact. DNR form present in ED. Upon arrival no pulses, PEA on monitor, IO in place, intubated with vomitus around face. No cardiac activity on US. Time of death 21:30.

## 2017-07-23 NOTE — ED PROVIDER NOTE - ATTENDING CONTRIBUTION TO CARE
Attending Statement: I have personally seen and examined this patient. I have fully participated in the care of this patient. I have reviewed all pertinent clinical information, including history physical exam, plan and the Resident's note and agree except as noted   81yo M BIBEMS from home w CPR in progress. EMS states pt had dinner, went unresponsive at home as per home aid. EMS intutabed pt, IO placed, medicated w 3 epi, amiodarone 300mg then 150mg, calcium, bicarbonate given. Also shocked a total of 8 times pta. FS wnl. no pulse regained. EMS report that a DNR found 20 min after resuscitation was started, unable to stop as per tele doc. Down time was 35-40min. EMS report son Ervin Jean was on the phone w fire depart and did not want resuscitation. no family in ED/ left a msg.   no pulse in ED.  pt intubated, bagged. vomit noted on the chin/chest cyanotic. pupils fixed and dilated. no cardiac activity on US. no spon breathing. right aka. left IO noted.   pt pronounced at 930pm

## 2017-07-23 NOTE — ED ADULT NURSE NOTE - OBJECTIVE STATEMENT
Pt received to [facility] as notification in cardiac arrest. EMS arrived w/ CPR in progress. DNR form present in ED. Upon arrival no pulses, PEA on monitor, IO in place, intubated with vomitus around face. No cardiac activity on cardiac monitor. Pt received to [facility] as notification in cardiac arrest. EMS arrived w/ CPR in progress. DNR form present in ED. Upon arrival no pulses, PEA on monitor, IO in place, intubated with vomitus around face. No cardiac activity on cardiac monitor. Expiration called at 2130.

## 2018-07-16 PROBLEM — I25.10 ATHEROSCLEROTIC HEART DISEASE OF NATIVE CORONARY ARTERY WITHOUT ANGINA PECTORIS: Chronic | Status: ACTIVE | Noted: 2017-07-09

## 2019-03-18 NOTE — ED PROVIDER NOTE - CRANIAL NERVE AND PUPILLARY EXAM
Pt asked if they are to see Dr. Hudson now and then put event monitor on, or have event monitor put on now and then see Dr. Hudson in six weeks. Please call to clarify.    cranial nerves 2-12 intact

## 2019-06-28 NOTE — CONSULT NOTE ADULT - PROBLEM SELECTOR PROBLEM 5
Chronic systolic HF (heart failure) pt. c/o of right flank pain that started earlier tonight. pt. denies fever, chills, n/v/d, CP, SOB, bloody urine, dysuria.

## 2021-06-10 NOTE — DISCHARGE NOTE ADULT - BECAUSE OF A PHYSICAL, MENTAL OR EMOTIONAL CONDITION, DO YOU HAVE DIFFICULTY DOING  ERRANDS ALONE LIKE VISITING A DOCTOR'S OFFICE OR SHOPPING (15 YEARS AND OLDER)
Per Dr. Barriga, patient being scheduled for surgery.  Will provide pain meds until then.      Percocet 5/325 one po q 6 hrs prn pain #30  Flexeril 10 mg one po q 8 hrs prn spasm #30    [facility] query completed.     Marlene Tee, CNP     Yes

## 2023-06-02 NOTE — ED ADULT NURSE NOTE - HARM RISK FACTORS
Anesthesia Pre Eval Note    Anesthesia ROS/Med Hx        Anesthetic Complication History:  Patient does not have a history of anesthetic complications      Pulmonary Review:  Patient does not have a pulmonary history      Neuro/Psych Review:  Patient does not have a neuro/psych history       Cardiovascular Review:  Patient does not have a cardiovascular history       GI/HEPATIC/RENAL Review:  Patient does not have a GI/hepatic/renalhistory       End/Other Review:  Patient does not have an endo/other history    Additional Results:     ALLERGIES:   -- Kiwi   (Food Or Med) -- HIVES       No results found for: WBC, RBC, HGB, HCT, MCV, MCH, MCHC, RDWCV, SODIUM, POTASSIUM, CHLORIDE, CO2, GLUCOSE, BUN, CREATININE, GFRESTIMATE, EGFRNONAFR, GFRA, GFRNA, CALCIUM, HCG, PLT, PTT, INR   Past Medical History:  No date: Hypertrophy of tonsils alone  No date: Liver mass  No date: Scoliosis    Past Surgical History:  No date: Open access colonoscopy       Prior to Admission medications :  Not on File       Patient Vitals in the past 24 hrs:  06/02/23 1003, BP:114/88, Temp:36.4 °C (97.5 °F), Temp src:Temporal, Pulse:73, Resp:16, SpO2:100 %, Height:5' 8\" (1.727 m), Weight:96 kg (211 lb 10.3 oz)      Relevant Problems   No relevant active problems       Physical Exam     Airway   Mallampati: II  TM Distance: >3 FB  Neck ROM: Full  Neck: Non-tender and Able to place in sniff position  TMJ Mobility: Good    Cardiovascular  Cardiovascular exam normal  Cardio Rhythm: Regular  Cardio Rate: Normal    Head Assessment  Head assessment: Normocephalic and Atraumatic    General Assessment  General Assessment: Alert and oriented and No acute distress    Dental Exam  Dental exam normal    Pulmonary Exam  Pulmonary exam normal  Breath sounds clear to auscultation:  Yes  Patient Demonstrates:  Non-labored Breathing    Abdominal Exam  Abdominal exam normal      Anesthesia Plan:    ASA Status: 1  Anesthesia Type: General    Induction:  Intravenous  Preferred Airway Type: ETT  Maintenance: Inhalational    Post-op Pain Management: Per Surgeon      Checklist  Reviewed: NPO Status, Allergies, Medications, Problem list and Past Med History  Consent/Risks Discussed Statement:  The proposed anesthetic plan, including its risks and benefits, have been discussed with the Patient along with the risks and benefits of alternatives. Questions were encouraged and answered and the patient and/or representative understands and agrees to proceed.        I discussed with the patient (and/or patient's legal representative) the risks and benefits of the proposed anesthesia plan, General, which may include services performed by other anesthesia providers.    Alternative anesthesia plans, if available, were reviewed with the patient (and/or patient's legal representative). Discussion has been held with the patient (and/or patient's legal representative) regarding risks of anesthesia, which include Nausea, Vomiting and Dental Injury and emergent situations that may require change in anesthesia plan.    The patient (and/or patient's legal representative) has indicated understanding, his/her questions have been answered, and he/she wishes to proceed with the planned anesthetic.    Blood Products: Not Anticipated     yes

## 2023-06-08 NOTE — ED ADULT NURSE NOTE - ATTEMPT TO OOB
Please let Cassi know that we are not able to clarify how enlarged the liver is due to variances in US technician's technique of measuring size of liver.  The Radiologist is stating it's enlarged, but my how much they are unable to say.  Her liver labs are normal. Size of liver can vary with weight.  We could order a CT scan of abdomen (to compare to previous ones she has had).  Heart scan CT did not visualize enough of the liver to report on.  Set up CT abdomen if she wishes.  Otherwise, we can await echocardiogram result and work on weight loss (as liver size can vary with weight), if she wants to hold off on another CT scan.  shari   no

## 2023-07-14 NOTE — ED ADULT NURSE NOTE - OBJECTIVE STATEMENT
Patient is an 82 year old male alert and oriented to person and place, presenting s/p a mechanical fall.  Patient denied hitting his head, denied LOC, denies pain and discomfort at present.  Negative chest pain, shortness of breath, N/V/D, fevers/chills.  Patient has an right leg above the knee amputation, reports he has a prosthetic and ambulates at home with a cane and assistance from his aide.  VSS, patient in nad at this time, will continue to monitor. [Patient Intake Form Reviewed] : Patient intake form was reviewed

## 2023-08-02 NOTE — PATIENT PROFILE ADULT. - IS PATIENT PREGNANT?
not applicable (Male) Purse String (Simple) Text: Given the location of the defect and the characteristics of the surrounding skin a purse string closure was deemed most appropriate.  Undermining was performed circumferentially around the surgical defect.  A purse string suture was then placed and tightened.

## 2023-08-22 NOTE — ED PROVIDER NOTE - FAMILY HISTORY
Donna Edwards is a 60 year old female here for  No chief complaint on file.    Denies latex allergy or sensitivity.    Medication verified and med list updated.  PCP and Pharmacy verified.    Social History     Tobacco Use   Smoking Status Never   Smokeless Tobacco Never     Advance Directives Filed: Yes    ECOG:   ECOG [08/22/23 1133]   ECOG Performance Status 2       Vitals:    Visit Vitals  Wt 93 kg (205 lb 1.9 oz)   LMP 07/11/2018 (Approximate)   BMI 35.21 kg/m²       These vital signs are:  Within defined parameters (Per Reference \"Defined Limits Hospital Outpatient Department (HOD)\")    Height: No.  Ht Readings from Last 1 Encounters:   08/17/23 5' 4\" (1.626 m)     Weight:Yes, shoes on.  Wt Readings from Last 3 Encounters:   08/22/23 93 kg (205 lb 1.9 oz)   08/17/23 92.1 kg (203 lb)   08/09/23 91.7 kg (202 lb 1.6 oz)       BMI: Body mass index is 35.21 kg/m².    REVIEW OF SYSTEMS  GENERAL:  Patient denies headache, fevers, chills, night sweats, excessive fatigue, change in appetite, weight loss, dizziness  ALLERGIC/IMMUNOLOGIC: Verified allergies: Yes  EYES:  Patient denies significant visual difficulties, double vision, blurred vision  ENT/MOUTH: Patient denies problems with hearing, sore throat, sinus drainage, mouth sores  ENDOCRINE:  Patient denies diabetes, thyroid disease, hormone replacement, hot flashes  HEMATOLOGIC/LYMPHATIC: Patient denies easy bruising, bleeding, tender lymph nodes, swollen lymph nodes  BREASTS: Patient denies abnormal masses of breast, nipple discharge, pain  RESPIRATORY:  Patient denies lung pain with breathing, cough, coughing up blood, shortness of breath  CARDIOVASCULAR:  Patient denies anginal chest pain, palpitations, shortness of breath when lying flat, peripheral edema  GASTROINTESTINAL: Patient denies abdominal pain , nausea, vomiting, diarrhea, GI bleeding, constipation, change in bowel habits, heartburn, sensation of feeling full, difficulty swallowing  : Patient  denies abnormal genital masses, blood in the urine, frequency, urgency, burning with urination, hesitancy, incontinence, vaginal bleeding, discharge  MUSCULOSKELETAL:  Patient denies joint pain, bone pain, joint swelling, redness, decreased range of motion  SKIN:  Patient denies chronic rashes, inflammation, ulcerations, skin changes, itching  NEUROLOGIC:  Patient denies loss of balance, areas of focal weakness, abnormal gait, sensory problems, numbness, tingling  PSYCHIATRIC: Patient denies depression, anxiety, but complains of: insomnia    This patient reported abnormal symptoms that needed immediate verbal communication: No     No pertinent family history in first degree relatives

## 2024-01-01 NOTE — OCCUPATIONAL THERAPY INITIAL EVALUATION ADULT - ANTICIPATED DISCHARGE DISPOSITION, OT EVAL
home w/ level of assist/extended care
Normal vision: sees adequately in most situations; can see medication labels, newsprint

## 2024-02-06 NOTE — DISCHARGE NOTE ADULT - HOSPITAL COURSE
[facility] phone call message- medication clarification/question:    Full Medication Name: ANTIBIOTIC    Dose:     Question: the pt thought the Dr was giving one to her      Pharmacy confirmed as [id number] - [place], [place] - [address]: Yes    OK to leave a message on voice mail? Yes    Primary language: English      needed? 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     + Fall- R/O syncope    + Acute on chronic systolic CHF exacerbation  - cards consult pending, consider IV Lasix    + Hyperglycemia (200s)    Transfered to ADS     Fall - R/o syncope  likely mechanical  Cardiology DR Olson   Per Cards NO plan for ICD sec to advanced dementia     Lower Back pain   Neuro DR Clement c/s-  CT spine - degenerative changes , no FX     CXR: Small left pleural effusion and mild pulmonary edema.  pulm c/s- dr mendosa called   increased Lasix to 40 mg daily     CT head and C-spine: No intracranial hemorrhage or mass effect. Chronic changes as described including large chronic infarcts. No acute cervical spine fracture. Degenerative   changes as described.    Dispo: Home with home aides

## 2024-04-05 NOTE — DISCHARGE NOTE ADULT - HOME CARE AGENCY
Chart accessed to assist [facility] pharmacy in prescriptions.    Zulay Plaza RN     24 hour aides through [facility] [phone]. Aides supplied by [facility], coordinator Fartun [phone].

## 2024-05-20 NOTE — ED ADULT TRIAGE NOTE - NS ED NURSE AMBULANCES
Fax received from [facility] regarding Pt  Pt location at SNF: [facility] wing  Fax sent by: RN    Fax regarding concern: Lab results    Assessment: Pt had following labs:    Results:    WBC: 2.8 Hgb: 12.9 Hct: 37.6     Plt: 104               Any recommendations or new orders?    
Lab noted   No new orders  
Noted. Routed through [facility] to [facility].    
[facility]

## 2024-11-19 NOTE — PROGRESS NOTE ADULT - ASSESSMENT
Patient significantly improved on steroids however will need rehab.  Patient unfortunately was denied by his insurance for rehab.  Case management working to obtain skilled nursing facility.  Continue prednisone 60 mg daily throughout rehab   81 yo male with CVA, Dementia, CAD, CABG, CMP, AS, Systolic HF, HTN, HLD and DM admitted  worsening dementia / JULIO /  Hyperkalemia.    1. CV stable   -no acs  -chronic systolic HF, cont bb/ace, increase metoprolol to 25 mg bid  -lasix as ordered  -AICD deferred due to advance dementia  -monitor BMP     2. HLD : continue with statin   3. HTN : bp acceptable , inc metoprolol to 25 bid  4. DVT PPX   5. neuro/med/psych  f.u , dc planning per primary team